# Patient Record
Sex: MALE | Race: WHITE | NOT HISPANIC OR LATINO | Employment: OTHER | URBAN - METROPOLITAN AREA
[De-identification: names, ages, dates, MRNs, and addresses within clinical notes are randomized per-mention and may not be internally consistent; named-entity substitution may affect disease eponyms.]

---

## 2020-04-22 ENCOUNTER — APPOINTMENT (OUTPATIENT)
Dept: GENERAL RADIOLOGY | Facility: CLINIC | Age: 60
End: 2020-04-22
Attending: NURSE PRACTITIONER
Payer: COMMERCIAL

## 2020-04-22 ENCOUNTER — HOSPITAL ENCOUNTER (INPATIENT)
Facility: CLINIC | Age: 60
LOS: 2 days | Discharge: HOME | End: 2020-04-24
Attending: NURSE PRACTITIONER | Admitting: INTERNAL MEDICINE
Payer: COMMERCIAL

## 2020-04-22 ENCOUNTER — NURSE TRIAGE (OUTPATIENT)
Dept: INTERNAL MEDICINE | Facility: CLINIC | Age: 60
End: 2020-04-22

## 2020-04-22 DIAGNOSIS — R07.9 CHEST PAIN: ICD-10-CM

## 2020-04-22 DIAGNOSIS — I20.0 UNSTABLE ANGINA (H): Primary | ICD-10-CM

## 2020-04-22 PROBLEM — R13.14 PHARYNGOESOPHAGEAL DYSPHAGIA: Status: ACTIVE | Noted: 2020-04-22

## 2020-04-22 LAB
ALBUMIN SERPL-MCNC: 4.2 G/DL (ref 3.4–5)
ALP SERPL-CCNC: 48 U/L (ref 40–150)
ALT SERPL W P-5'-P-CCNC: 30 U/L (ref 0–70)
ANION GAP SERPL CALCULATED.3IONS-SCNC: 7 MMOL/L (ref 3–14)
AST SERPL W P-5'-P-CCNC: 19 U/L (ref 0–45)
BASOPHILS # BLD AUTO: 0 10E9/L (ref 0–0.2)
BASOPHILS NFR BLD AUTO: 0.3 %
BILIRUB SERPL-MCNC: 0.8 MG/DL (ref 0.2–1.3)
BUN SERPL-MCNC: 13 MG/DL (ref 7–30)
CALCIUM SERPL-MCNC: 9.4 MG/DL (ref 8.5–10.1)
CHLORIDE SERPL-SCNC: 107 MMOL/L (ref 94–109)
CHOLEST SERPL-MCNC: 231 MG/DL
CO2 SERPL-SCNC: 23 MMOL/L (ref 20–32)
CREAT SERPL-MCNC: 0.69 MG/DL (ref 0.66–1.25)
D DIMER PPP FEU-MCNC: 0.5 UG/ML FEU (ref 0–0.5)
DIFFERENTIAL METHOD BLD: NORMAL
EOSINOPHIL # BLD AUTO: 0 10E9/L (ref 0–0.7)
EOSINOPHIL NFR BLD AUTO: 0.6 %
ERYTHROCYTE [DISTWIDTH] IN BLOOD BY AUTOMATED COUNT: 12.6 % (ref 10–15)
ERYTHROCYTE [DISTWIDTH] IN BLOOD BY AUTOMATED COUNT: 12.7 % (ref 10–15)
GFR SERPL CREATININE-BSD FRML MDRD: >90 ML/MIN/{1.73_M2}
GLUCOSE SERPL-MCNC: 97 MG/DL (ref 70–99)
HBA1C MFR BLD: 5 % (ref 0–5.6)
HCT VFR BLD AUTO: 44.1 % (ref 40–53)
HCT VFR BLD AUTO: 44.4 % (ref 40–53)
HDLC SERPL-MCNC: 36 MG/DL
HGB BLD-MCNC: 15.8 G/DL (ref 13.3–17.7)
HGB BLD-MCNC: 15.9 G/DL (ref 13.3–17.7)
IMM GRANULOCYTES # BLD: 0 10E9/L (ref 0–0.4)
IMM GRANULOCYTES NFR BLD: 0.2 %
INTERPRETATION ECG - MUSE: NORMAL
LDLC SERPL CALC-MCNC: 158 MG/DL
LIPASE SERPL-CCNC: 77 U/L (ref 73–393)
LYMPHOCYTES # BLD AUTO: 1.3 10E9/L (ref 0.8–5.3)
LYMPHOCYTES NFR BLD AUTO: 20.4 %
MCH RBC QN AUTO: 31.5 PG (ref 26.5–33)
MCH RBC QN AUTO: 31.6 PG (ref 26.5–33)
MCHC RBC AUTO-ENTMCNC: 35.6 G/DL (ref 31.5–36.5)
MCHC RBC AUTO-ENTMCNC: 36.1 G/DL (ref 31.5–36.5)
MCV RBC AUTO: 88 FL (ref 78–100)
MCV RBC AUTO: 89 FL (ref 78–100)
MONOCYTES # BLD AUTO: 0.3 10E9/L (ref 0–1.3)
MONOCYTES NFR BLD AUTO: 5.3 %
NEUTROPHILS # BLD AUTO: 4.7 10E9/L (ref 1.6–8.3)
NEUTROPHILS NFR BLD AUTO: 73.2 %
NONHDLC SERPL-MCNC: 195 MG/DL
NRBC # BLD AUTO: 0 10*3/UL
NRBC BLD AUTO-RTO: 0 /100
PLATELET # BLD AUTO: 241 10E9/L (ref 150–450)
PLATELET # BLD AUTO: 245 10E9/L (ref 150–450)
POTASSIUM SERPL-SCNC: 3.8 MMOL/L (ref 3.4–5.3)
PROT SERPL-MCNC: 7.7 G/DL (ref 6.8–8.8)
RBC # BLD AUTO: 5.01 10E12/L (ref 4.4–5.9)
RBC # BLD AUTO: 5.03 10E12/L (ref 4.4–5.9)
SODIUM SERPL-SCNC: 137 MMOL/L (ref 133–144)
TRIGL SERPL-MCNC: 186 MG/DL
TROPONIN I SERPL-MCNC: <0.015 UG/L (ref 0–0.04)
TSH SERPL DL<=0.005 MIU/L-ACNC: 3.29 MU/L (ref 0.4–4)
WBC # BLD AUTO: 6.5 10E9/L (ref 4–11)
WBC # BLD AUTO: 8.1 10E9/L (ref 4–11)

## 2020-04-22 PROCEDURE — 99223 1ST HOSP IP/OBS HIGH 75: CPT | Mod: AI | Performed by: INTERNAL MEDICINE

## 2020-04-22 PROCEDURE — 85379 FIBRIN DEGRADATION QUANT: CPT | Performed by: NURSE PRACTITIONER

## 2020-04-22 PROCEDURE — 83690 ASSAY OF LIPASE: CPT | Performed by: NURSE PRACTITIONER

## 2020-04-22 PROCEDURE — 25000132 ZZH RX MED GY IP 250 OP 250 PS 637: Performed by: NURSE PRACTITIONER

## 2020-04-22 PROCEDURE — 84484 ASSAY OF TROPONIN QUANT: CPT | Performed by: INTERNAL MEDICINE

## 2020-04-22 PROCEDURE — 25000132 ZZH RX MED GY IP 250 OP 250 PS 637: Performed by: INTERNAL MEDICINE

## 2020-04-22 PROCEDURE — 99285 EMERGENCY DEPT VISIT HI MDM: CPT | Mod: 25

## 2020-04-22 PROCEDURE — 80061 LIPID PANEL: CPT | Performed by: NURSE PRACTITIONER

## 2020-04-22 PROCEDURE — 12000000 ZZH R&B MED SURG/OB

## 2020-04-22 PROCEDURE — 36415 COLL VENOUS BLD VENIPUNCTURE: CPT | Performed by: INTERNAL MEDICINE

## 2020-04-22 PROCEDURE — 85027 COMPLETE CBC AUTOMATED: CPT | Performed by: INTERNAL MEDICINE

## 2020-04-22 PROCEDURE — 71046 X-RAY EXAM CHEST 2 VIEWS: CPT

## 2020-04-22 PROCEDURE — 25000128 H RX IP 250 OP 636

## 2020-04-22 PROCEDURE — 80053 COMPREHEN METABOLIC PANEL: CPT | Performed by: NURSE PRACTITIONER

## 2020-04-22 PROCEDURE — 83036 HEMOGLOBIN GLYCOSYLATED A1C: CPT | Performed by: NURSE PRACTITIONER

## 2020-04-22 PROCEDURE — 80061 LIPID PANEL: CPT | Performed by: INTERNAL MEDICINE

## 2020-04-22 PROCEDURE — 93005 ELECTROCARDIOGRAM TRACING: CPT

## 2020-04-22 PROCEDURE — 84443 ASSAY THYROID STIM HORMONE: CPT | Performed by: INTERNAL MEDICINE

## 2020-04-22 PROCEDURE — 84484 ASSAY OF TROPONIN QUANT: CPT | Mod: 91 | Performed by: NURSE PRACTITIONER

## 2020-04-22 PROCEDURE — 85025 COMPLETE CBC W/AUTO DIFF WBC: CPT | Performed by: NURSE PRACTITIONER

## 2020-04-22 RX ORDER — PROCHLORPERAZINE 25 MG
25 SUPPOSITORY, RECTAL RECTAL EVERY 12 HOURS PRN
Status: DISCONTINUED | OUTPATIENT
Start: 2020-04-22 | End: 2020-04-24 | Stop reason: HOSPADM

## 2020-04-22 RX ORDER — SIMVASTATIN 20 MG
20 TABLET ORAL EVERY EVENING
Status: DISCONTINUED | OUTPATIENT
Start: 2020-04-22 | End: 2020-04-23

## 2020-04-22 RX ORDER — MORPHINE SULFATE 2 MG/ML
2-4 INJECTION, SOLUTION INTRAMUSCULAR; INTRAVENOUS
Status: DISCONTINUED | OUTPATIENT
Start: 2020-04-22 | End: 2020-04-24 | Stop reason: HOSPADM

## 2020-04-22 RX ORDER — SODIUM CHLORIDE 9 MG/ML
INJECTION, SOLUTION INTRAVENOUS CONTINUOUS
Status: DISCONTINUED | OUTPATIENT
Start: 2020-04-23 | End: 2020-04-23

## 2020-04-22 RX ORDER — NITROGLYCERIN 0.4 MG/1
0.4 TABLET SUBLINGUAL EVERY 5 MIN PRN
Status: DISCONTINUED | OUTPATIENT
Start: 2020-04-22 | End: 2020-04-23

## 2020-04-22 RX ORDER — POLYETHYLENE GLYCOL 3350 17 G/17G
17 POWDER, FOR SOLUTION ORAL DAILY PRN
Status: DISCONTINUED | OUTPATIENT
Start: 2020-04-22 | End: 2020-04-24 | Stop reason: HOSPADM

## 2020-04-22 RX ORDER — METOCLOPRAMIDE HYDROCHLORIDE 5 MG/ML
10 INJECTION INTRAMUSCULAR; INTRAVENOUS EVERY 6 HOURS PRN
Status: DISCONTINUED | OUTPATIENT
Start: 2020-04-22 | End: 2020-04-24 | Stop reason: HOSPADM

## 2020-04-22 RX ORDER — ONDANSETRON 4 MG/1
4 TABLET, ORALLY DISINTEGRATING ORAL EVERY 6 HOURS PRN
Status: DISCONTINUED | OUTPATIENT
Start: 2020-04-22 | End: 2020-04-24 | Stop reason: HOSPADM

## 2020-04-22 RX ORDER — NALOXONE HYDROCHLORIDE 0.4 MG/ML
.1-.4 INJECTION, SOLUTION INTRAMUSCULAR; INTRAVENOUS; SUBCUTANEOUS
Status: DISCONTINUED | OUTPATIENT
Start: 2020-04-22 | End: 2020-04-23

## 2020-04-22 RX ORDER — PROCHLORPERAZINE MALEATE 5 MG
10 TABLET ORAL EVERY 6 HOURS PRN
Status: DISCONTINUED | OUTPATIENT
Start: 2020-04-22 | End: 2020-04-24 | Stop reason: HOSPADM

## 2020-04-22 RX ORDER — PANTOPRAZOLE SODIUM 40 MG/1
40 TABLET, DELAYED RELEASE ORAL
Status: DISCONTINUED | OUTPATIENT
Start: 2020-04-23 | End: 2020-04-24 | Stop reason: HOSPADM

## 2020-04-22 RX ORDER — ONDANSETRON 2 MG/ML
4 INJECTION INTRAMUSCULAR; INTRAVENOUS EVERY 6 HOURS PRN
Status: DISCONTINUED | OUTPATIENT
Start: 2020-04-22 | End: 2020-04-24 | Stop reason: HOSPADM

## 2020-04-22 RX ORDER — ALUMINA, MAGNESIA, AND SIMETHICONE 2400; 2400; 240 MG/30ML; MG/30ML; MG/30ML
30 SUSPENSION ORAL EVERY 4 HOURS PRN
Status: DISCONTINUED | OUTPATIENT
Start: 2020-04-22 | End: 2020-04-24 | Stop reason: HOSPADM

## 2020-04-22 RX ORDER — SODIUM CHLORIDE 9 MG/ML
INJECTION, SOLUTION INTRAVENOUS CONTINUOUS
Status: DISCONTINUED | OUTPATIENT
Start: 2020-04-22 | End: 2020-04-22

## 2020-04-22 RX ORDER — LABETALOL HYDROCHLORIDE 5 MG/ML
10 INJECTION, SOLUTION INTRAVENOUS
Status: DISCONTINUED | OUTPATIENT
Start: 2020-04-22 | End: 2020-04-24 | Stop reason: HOSPADM

## 2020-04-22 RX ORDER — ASPIRIN 81 MG/1
324 TABLET, CHEWABLE ORAL ONCE
Status: COMPLETED | OUTPATIENT
Start: 2020-04-22 | End: 2020-04-22

## 2020-04-22 RX ORDER — ACETAMINOPHEN 650 MG/1
650 SUPPOSITORY RECTAL EVERY 4 HOURS PRN
Status: DISCONTINUED | OUTPATIENT
Start: 2020-04-22 | End: 2020-04-24 | Stop reason: HOSPADM

## 2020-04-22 RX ORDER — HEPARIN SODIUM 10000 [USP'U]/100ML
1100 INJECTION, SOLUTION INTRAVENOUS CONTINUOUS
Status: DISCONTINUED | OUTPATIENT
Start: 2020-04-22 | End: 2020-04-23

## 2020-04-22 RX ORDER — ACETAMINOPHEN 325 MG/1
650 TABLET ORAL EVERY 4 HOURS PRN
Status: DISCONTINUED | OUTPATIENT
Start: 2020-04-22 | End: 2020-04-24 | Stop reason: HOSPADM

## 2020-04-22 RX ORDER — LIDOCAINE 40 MG/G
CREAM TOPICAL
Status: DISCONTINUED | OUTPATIENT
Start: 2020-04-22 | End: 2020-04-24 | Stop reason: HOSPADM

## 2020-04-22 RX ORDER — METOCLOPRAMIDE 5 MG/1
10 TABLET ORAL EVERY 6 HOURS PRN
Status: DISCONTINUED | OUTPATIENT
Start: 2020-04-22 | End: 2020-04-24 | Stop reason: HOSPADM

## 2020-04-22 RX ORDER — MULTIPLE VITAMINS W/ MINERALS TAB 9MG-400MCG
1 TAB ORAL DAILY
COMMUNITY
End: 2020-05-08

## 2020-04-22 RX ORDER — ASPIRIN 325 MG
325 TABLET ORAL DAILY
Status: DISCONTINUED | OUTPATIENT
Start: 2020-04-23 | End: 2020-04-23

## 2020-04-22 RX ORDER — METOPROLOL TARTRATE 25 MG/1
25 TABLET, FILM COATED ORAL 2 TIMES DAILY
Status: DISCONTINUED | OUTPATIENT
Start: 2020-04-22 | End: 2020-04-22

## 2020-04-22 RX ADMIN — SIMVASTATIN 20 MG: 20 TABLET, FILM COATED ORAL at 20:20

## 2020-04-22 RX ADMIN — ASPIRIN 81 MG 324 MG: 81 TABLET ORAL at 12:34

## 2020-04-22 RX ADMIN — HEPARIN SODIUM 1100 UNITS/HR: 10000 INJECTION, SOLUTION INTRAVENOUS at 18:33

## 2020-04-22 RX ADMIN — HEPARIN SODIUM 1100 UNITS/HR: 10000 INJECTION, SOLUTION INTRAVENOUS at 18:36

## 2020-04-22 ASSESSMENT — ACTIVITIES OF DAILY LIVING (ADL)
COGNITION: 0 - NO COGNITION ISSUES REPORTED
RETIRED_EATING: 0-->INDEPENDENT
ADLS_ACUITY_SCORE: 10
BATHING: 0-->INDEPENDENT
RETIRED_COMMUNICATION: 0-->UNDERSTANDS/COMMUNICATES WITHOUT DIFFICULTY
FALL_HISTORY_WITHIN_LAST_SIX_MONTHS: NO
DRESS: 0-->INDEPENDENT
TOILETING: 0-->INDEPENDENT
TRANSFERRING: 0-->INDEPENDENT
SWALLOWING: 0-->SWALLOWS FOODS/LIQUIDS WITHOUT DIFFICULTY
AMBULATION: 0-->INDEPENDENT

## 2020-04-22 ASSESSMENT — MIFFLIN-ST. JEOR: SCORE: 1968.38

## 2020-04-22 ASSESSMENT — ENCOUNTER SYMPTOMS
SHORTNESS OF BREATH: 0
VOMITING: 0
NAUSEA: 0
CHEST TIGHTNESS: 1
COUGH: 0
DIAPHORESIS: 0
FEVER: 0

## 2020-04-22 NOTE — ED NOTES
"[employer] [employer]  ED Nurse Handoff Report    ED Chief complaint: Chest Pain      ED Diagnosis:   Final diagnoses:   Chest pain       Code Status: Full Code    Allergies:   Allergies   Allergen Reactions     Niacin      flushing       Patient Story: Over last month, patient has been having intermittent episodes of chest pain with exertion.  Recently, he has developed chest pain at rest and with exertion. He walks 2 miles almost every day for the past month. No history of cardiac.   Focused Assessment:  BP has been elevated upon arrival and throughout ED stay.  Patient states he usually has normal blood pressure.     Treatments and/or interventions provided:  mg. Cardiac monitor shows NSR.    Patient's response to treatments and/or interventions: NA    To be done/followed up on inpatient unit:  Patient needs a PCP.    Does this patient have any cognitive concerns?: None    Activity level - Baseline/Home:  Independent  Activity Level - Current:   Independent    Patient's Preferred language: English   Needed?: No    Isolation: None  Infection: Not Applicable  Bariatric?: No    Vital Signs:   Vitals:    04/22/20 1141   BP: (!) 172/87   Pulse: 75   Resp: 14   Temp: 97.7  F (36.5  C)   SpO2: 97%   Weight: 108.9 kg (240 lb)   Height: 1.88 m (6' 2\")       Cardiac Rhythm: NSR    Was the PSS-3 completed:   Yes  What interventions are required if any?  NA             Family Comments: NA  OBS brochure/video discussed/provided to patient/family: Yes              Name of person given brochure if not patient: NA              Relationship to patient: NA    For the majority of the shift this patient's behavior was Green.   Behavioral interventions performed were NA.    ED NURSE PHONE NUMBER: [phone]    Danielle Montemayor RN, BSN  Emergency Department  [facility]         "

## 2020-04-22 NOTE — H&P
[facility]    History and Physical  Hospitalist       Date of Admission:  4/22/2020    Assessment & Plan     This is a 60-year-old male with history of hyperlipidemia, not on any medication, comes to the ER with complaint of worsening chest pain for the last 1 month.      ASSESSMENT AND PLAN:   1.  Unstable angina.  This is a 60-year-old male with history of hyperlipidemia, mild obesity, comes in with worsening chest pain which is retrosternal and initially with exertion and now daily and intermittent, lasting for 5-45 minutes and resolved.  At this time, given his family history, his personal history, we need to rule out coronary artery disease.  We will treat him as unstable angina.  He already got aspirin in the ER.  EKG at rest at this time is negative and does not show any acute ischemic changes.  Chest x-ray revealed no acute infiltrate, congestive changes, pneumothorax or enlarged heart.  D-dimer negative, we will admit him, keep him on aspirin.  I will avoid beta blocker at this time, as he was going to have the stress test in the morning, keep him on IV heparin, low dose for cardiac reasons.  For unstable angina, simvastatin 20 mg daily.  Echocardiogram and stress echo in the morning.  Consult Cardiology to evaluate the patient.  Serial troponins at this time.     2.  Dysphagia:  The patient does have history of dysphagia but does not have any other warning signs, weight is stable, actually he has gained some weight.  No melena, hematochezia or altered bowel movement.  No history of malnourishment. Dysphagia is with solids more than liquids.  This can be secondary to reflux.  The chest pain can be secondary to this dysphagia.  At this time want to rule out unstable angina.  We will have GI evaluate the patient.  The patient maybe in the future or as an outpatient will need EGD.   3.  History of hyperlipidemia, not on any medication.  We will go ahead and screen him for hyperlipidemia.   Check his lipid panel in the morning.   4.  Gastrointestinal prophylaxis with Protonix.   5.  Deep venous thrombosis prophylaxis with IV heparin.      CODE STATUS:  Full code.      At this time, I will admit him to the cardiac floor, start him on IV heparin, aspirin, simvastatin.  Hold beta blocker until he has the stress test.  If positive, we will restart the beta blockers.  Consult Cardiology.  Consult GI to evaluate the patient.  We will screen him for diabetes, check his A1c and lipid panel at this time.      The case discussed with ER physician and the nursing staff taking care of the patient.         EDU RAMÍREZ MD         DVT Prophylaxis: Heparin IV  Code Status: Full Code    Disposition: Expected discharge in 1-2 days once stable.    Edu Ramírez MD    Primary Care Physician   Physician No Ref-Primary    Chief Complaint   Chest pain    History is obtained from the patient    History of Present Illness   Admitted:     04/22/2020      HISTORY OF PRESENT ILLNESS:  This is a 60-year-old male with history of hyperlipidemia, not on any medication, comes to the ER with complaint of worsening chest pain for the last 1 month.      According to the patient, he started walking back again after the winter and was walking up the hill a month ago when he noticed that he started having retrosternal chest pain to the left, felt like pressure of 5-6/10 on pain scale as long as he has been climbing the hill.  Once he is on top of that, his chest pain went away.  It is happening daily whenever he tried to walk.  So he stopped going up the hill and was walking and 1 week later, he started having pain while at rest as well.      The patient almost getting pain every day for the last 1 week or so and lasted anywhere from 5 minutes to 45 minutes, sometimes associated with exertion and sometimes even at rest.  There are no associated symptoms of shortness of breath, radiation, headache, dizziness, lightheadedness or  palpitation.  This morning when he woke up, he was doing fine.  Then after his video conference today he started having chest pain again which lasted for half an hour and resolved by itself.  This evening he started having pain again, so he comes to the ER.  While he was in the ER, he was chest pain-free.  Then he started walking in the room and got the pain again that lasted for 5 minutes.  He never told anyone at that time.      The patient is an ex-smoker, quit smoking in 1993, about a 13 pack year history.  Family history is positive for thromboembolism and atrial fibrillation.  Grandfather from paternal side has a history of coronary artery disease.  The patient does not drink alcohol.  Does not use any recreational drugs either.  Because of this worsening symptoms, he comes to the ER.      The patient also told me that for some time, has been going on, that he has been having some dysphagia symptoms.  Whenever he eats solid food he has to take a glass of water to keep it down.  He felt like it is stuck in his neck.  For some time and has been looking to see some gastroenterologist, but because of the COVID-19 situation he was unable to see his doctor or the gastroenterologist at that time.  He denies any symptoms of heartburn.  He denies any hematemesis, melena, hematochezia.  No altered bowel movements.  He had a colonoscopy when he was 50 and is due for one now,  but according to him, it was absolutely normal.  He told me that he has a history of a stress test in 2002 which was negative as well.  He does have a history of hyperlipidemia.  He was prescribed niacin at that time, but because of the side effect he stopped using that medication.     Past Medical History    I have reviewed this patient's medical history and updated it with pertinent information if needed.   Past Medical History:   Diagnosis Date     Intestinal infection due to Clostridium difficile      Mixed hyperlipidemia        Past Surgical  History   I have reviewed this patient's surgical history and updated it with pertinent information if needed.  Past Surgical History:   Procedure Laterality Date     BACK SURGERY      L5-S1 disk herniation      NO HISTORY OF SURGERY         Prior to Admission Medications   Prior to Admission Medications   Prescriptions Last Dose Informant Patient Reported? Taking?   multivitamin w/minerals (THERA-VIT-M) tablet 4/22/2020 at am  Yes Yes   Sig: Take 1 tablet by mouth daily      Facility-Administered Medications: None     Allergies   Allergies   Allergen Reactions     Niacin      flushing       Social History   I have reviewed this patient's social history and updated it with pertinent information if needed. Amado Veliz  reports that he quit smoking about 28 years ago. He quit after 13.00 years of use. He has never used smokeless tobacco. He reports that he does not drink alcohol or use drugs.    Family History   I have reviewed this patient's family history and updated it with pertinent information if needed.   Family History   Problem Relation Age of Onset     Family History Negative Father      Deep Vein Thrombosis Father      Atrial fibrillation Father      Family History Negative Mother      Family History Negative Brother      Deep Vein Thrombosis Sister      Ovarian Cancer Maternal Grandmother      Coronary Artery Disease Maternal Grandfather        Review of Systems   CONSTITUTIONAL:  negative  EYES:  negative  HEENT:  negative  RESPIRATORY:  negative  CARDIOVASCULAR:  positive for  chest pain  GASTROINTESTINAL:  positive for dysphagia  GENITOURINARY:  negative  INTEGUMENT/BREAST:  negative  HEMATOLOGIC/LYMPHATIC:  negative  ALLERGIC/IMMUNOLOGIC:  negative  ENDOCRINE:  negative  MUSCULOSKELETAL:  negative  NEUROLOGICAL:  negative  BEHAVIOR/PSYCH:  negative    Physical Exam   Temp: 97.7  F (36.5  C)   BP: (!) 172/87 Pulse: 75   Resp: 14 SpO2: 97 % O2 Device: None (Room air)    Vital Signs with  Ranges  Temp:  [97.7  F (36.5  C)] 97.7  F (36.5  C)  Pulse:  [75] 75  Resp:  [14] 14  BP: (172)/(87) 172/87  SpO2:  [97 %] 97 %  240 lbs 0 oz    Constitutional: Awake, alert, cooperative, no apparent distress.  Eyes: Conjunctiva and pupils examined and normal.  HEENT: Moist mucous membranes, normal dentition.  Respiratory: Clear to auscultation bilaterally, no crackles or wheezing.  Cardiovascular: Regular rate and rhythm, normal S1 and S2, and no murmur noted.  GI: Soft, non-distended, non-tender, normal bowel sounds.  Lymph/Hematologic: No anterior cervical or supraclavicular adenopathy.  Skin: No rashes, no cyanosis, no edema.  Musculoskeletal: No joint swelling, erythema or tenderness.  Neurologic: Cranial nerves 2-12 intact, normal strength and sensation.  Psychiatric: Alert, oriented to person, place and time, no obvious anxiety or depression.    Data   Data reviewed today:  I personally reviewed the EKG tracing showing NSR, no acute ischemic changes and the chest x-ray image(s) showing normal size heart, no acute infiltrate or congestive changes.  Recent Labs   Lab 04/22/20  1420 04/22/20  1222   WBC  --  6.5   HGB  --  15.9   MCV  --  88   PLT  --  241   NA  --  137   POTASSIUM  --  3.8   CHLORIDE  --  107   CO2  --  23   BUN  --  13   CR  --  0.69   ANIONGAP  --  7   DANIELITO  --  9.4   GLC  --  97   ALBUMIN  --  4.2   PROTTOTAL  --  7.7   BILITOTAL  --  0.8   ALKPHOS  --  48   ALT  --  30   AST  --  19   LIPASE  --  77   TROPI <0.015 <0.015       Recent Results (from the past 24 hour(s))   Chest XR,  PA & LAT    Narrative    CHEST TWO VIEWS April 22, 2020 1:41 PM     HISTORY: Chest pain.    COMPARISON: None.       Impression    IMPRESSION: There are no acute infiltrates. The cardiac silhouette is  not enlarged. Pulmonary vasculature is unremarkable.    DAVIN SALCEDO MD

## 2020-04-22 NOTE — TELEPHONE ENCOUNTER
Pt states his middle left chest pain started about a month ago. He was walking up a hill and started. Once he rested, the pain went away. He walks 2 miles daily. He stopped walking up the hill and walks flat surface now.   The chest pain for the past week, is now starting without any activity. Can be sitting watching TV and feel the pain.   This can last 45 minutes to an hour. Does not radiate. No sweating. It is mild.     He had a trip to [place] on 3/12-3/17. Flight down, drove home.     Advised [facility] ED if pain returns today, and to take a full aspirin. He has them at home. He agrees with this.         Additional Information    Negative: Severe difficulty breathing (e.g., struggling for each breath, speaks in single words)    Negative: Passed out (i.e., fainted, collapsed and was not responding)    Negative: Chest pain lasting longer than 5 minutes and ANY of the following:* Over 50 years old* Over 30 years old and at least one cardiac risk factor (i.e., high blood pressure, diabetes, high cholesterol, obesity, smoker or strong family history of heart disease)* Pain is crushing, pressure-like, or heavy * Took nitroglycerin and chest pain was not relieved* History of heart disease (i.e., angina, heart attack, bypass surgery, angioplasty, CHF)    Negative: Visible sweat on face or sweat dripping down face    Negative: Sounds like a life-threatening emergency to the triager    Negative: Followed an injury to chest    Negative: SEVERE chest pain    Negative: Pain also present in shoulder(s) or arm(s) or jaw    Negative: Difficulty breathing    Negative: Cocaine use within last 3 days    Negative: History of prior 'blood clot' in leg or lungs (i.e., deep vein thrombosis, pulmonary embolism)    Negative: Recent illness requiring prolonged bed rest (i.e., immobilization)    Negative: Hip or leg fracture in past 2 months (e.g, or had cast on leg or ankle)    Negative: Major surgery in the past month    Negative:  "Recent long-distance travel with prolonged time in car, bus, plane, or train (i.e., within past 2 weeks; 6 or more hours duration)    Negative: Heart beating irregularly or very rapidly    Chest pain lasting longer than 5 minutes    Answer Assessment - Initial Assessment Questions  1. LOCATION: \"Where does it hurt?\"        Middle, left chest   2. RADIATION: \"Does the pain go anywhere else?\" (e.g., into neck, jaw, arms, back)      none  3. ONSET: \"When did the chest pain begin?\" (Minutes, hours or days)       When walking up a hill. A few weeks ago. Now with sitting. 4 weeks total.   4. PATTERN \"Does the pain come and go, or has it been constant since it started?\"  \"Does it get worse with exertion?\"       Comes and goes. Not really. Does a 2 mile walk daily.   5. DURATION: \"How long does it last\" (e.g., seconds, minutes, hours)      At first, within minutes. Now it can last 45 minutes to an hour.   6. SEVERITY: \"How bad is the pain?\"  (e.g., Scale 1-10; mild, moderate, or severe)     - MILD (1-3): doesn't interfere with normal activities      - MODERATE (4-7): interferes with normal activities or awakens from sleep     - SEVERE (8-10): excruciating pain, unable to do any normal activities        Mild, but worrisome.    7. CARDIAC RISK FACTORS: \"Do you have any history of heart problems or risk factors for heart disease?\" (e.g., prior heart attack, angina; high blood pressure, diabetes, being overweight, high cholesterol, smoking, or strong family history of heart disease)      Dad had Afib, on Blood thinner, sister on blood thinner, for DVTs.   8. PULMONARY RISK FACTORS: \"Do you have any history of lung disease?\"  (e.g., blood clots in lung, asthma, emphysema, birth control pills)      None.   9. CAUSE: \"What do you think is causing the chest pain?\"      Not sure/  10. OTHER SYMPTOMS: \"Do you have any other symptoms?\" (e.g., dizziness, nausea, vomiting, sweating, fever, difficulty breathing, cough)        none  11. " "PREGNANCY: \"Is there any chance you are pregnant?\" \"When was your last menstrual period?\"        na    Protocols used: CHEST PAIN-A-OH      "

## 2020-04-22 NOTE — PLAN OF CARE
Patient transferred from ED @ 1645. A&O x4. Up independent. Denies chest pain or SOB. On tele with SR. Heparin infusing. Patient not being comfortable in his assigned room facing droplet isolation room. The writer offered if patient will feel comfortable communicating with a supervisor. Pt appreciated, notified ANS.

## 2020-04-22 NOTE — ED PROVIDER NOTES
Emergency Department Attending Supervision Note  4/22/2020  3:42 PM      I evaluated this patient in conjunction with Ailyn Blackwell DNP and agree with his evaluation, exam, diagnosis, and disposition.      Briefly, the patient presented with a history of some tightness in his chest this spring when he is been walking up and down the hill.  It is happened in the past in the spring when he starts walking again but this time it concerned him because for the last few days, he has been getting the tightness in his chest when he has been at rest not doing anything at home.  No real shortness of breath, no fevers or chills, no cough.      On my exam, patient has an EKG with nonspecific changes, 2 troponins are negative, chest x-ray is unremarkable.  I think his history is concerning enough that he should come in overnight for further work-up and if negative, stress test tomorrow.  This could be unstable angina.  He is pain-free at this time but did get a brief episode of tightness in the emergency room.  He did not tell the nurse about it.  He was given aspirin here will hold on heparin at this time.  If he redevelops chest pain, would try nitro.      Diagnosis    ICD-10-CM    1. Chest pain  R07.9          Zuleyka Sutherland MD , Zuleyka Garcia MD  04/22/20 1544

## 2020-04-22 NOTE — PROGRESS NOTES
RECEIVING UNIT ED HANDOFF REVIEW    ED Nurse Handoff Report was reviewed by: Christi Palacios RN on April 22, 2020 at 4:10 PM

## 2020-04-22 NOTE — PHARMACY-ADMISSION MEDICATION HISTORY
Pharmacy Medication History  Admission medication history interview status for the 4/22/2020  admission is complete. See EPIC admission navigator for prior to admission medications     Medication history sources: Patient  Medication history source reliability: Good  Adherence assessment: Good    Significant changes made to the medication list:  -Removed outdated medications and supplements    Additional medication history information:   -He reports currently only taking a multivitamin. He does not usually take aspirin but was given some today.    Medication reconciliation completed by provider prior to medication history? No    Time spent in this activity: 5 min      Prior to Admission medications    Medication Sig Last Dose Taking? Auth Provider   multivitamin w/minerals (THERA-VIT-M) tablet Take 1 tablet by mouth daily 4/22/2020 at am Yes Unknown, Entered By History

## 2020-04-22 NOTE — ED TRIAGE NOTES
Pt presents with chest pain that has been intermittent for the past month. Pt states one month ago he noticed while exerting self or walking up hills he would developed chest pain and would resolved when the exertion stopped. Now, pt states pain returns intermittently when at rest. Denies cardiac hx but has family hx. Pt is pain free at this time

## 2020-04-22 NOTE — Clinical Note
The first balloon was inserted into the right coronary artery and proximal right coronary artery.Max pressure = 12 jhoan. Total duration = 12 seconds.     Max pressure = 8 jhoan. Total duration = 11 seconds.    Balloon reinflated a second time: Max pressure = 8 jhoan. Total duration = 11 seconds.

## 2020-04-22 NOTE — H&P
Admitted:     04/22/2020      HISTORY OF PRESENT ILLNESS:  This is a 60-year-old male with history of hyperlipidemia, not on any medication, comes to the ER with complaint of worsening chest pain for the last 1 month.      According to the patient, he started walking back again after the winter and was walking up the hill a month ago when he noticed that he started having retrosternal chest pain to the left, felt like pressure of 5-6/10 on pain scale as long as he has been climbing the hill.  Once he is on top of that, his chest pain went away.  It is happening daily whenever he tried to walk.  So he stopped going up the hill and was walking and 1 week later, he started having pain while at rest as well.      The patient almost getting pain every day for the last 1 week or so and lasted anywhere from 5 minutes to 45 minutes, sometimes associated with exertion and sometimes even at rest.  There are no associated symptoms of shortness of breath, radiation, headache, dizziness, lightheadedness or palpitation.  This morning when he woke up, he was doing fine.  Then after his video conference today he started having chest pain again which lasted for half an hour and resolved by itself.  This evening he started having pain again, so he comes to the ER.  While he was in the ER, he was chest pain-free.  Then he started walking in the room and got the pain again that lasted for 5 minutes.  He never told anyone at that time.      The patient is an ex-smoker, quit smoking in 1993, about a 13 pack year history.  Family history is positive for thromboembolism and atrial fibrillation.  Grandfather from paternal side has a history of coronary artery disease.  The patient does not drink alcohol.  Does not use any recreational drugs either.  Because of this worsening symptoms, he comes to the ER.      The patient also told me that for some time, has been going on, that he has been having some dysphagia symptoms.  Whenever he eats  solid food he has to take a glass of water to keep it down.  He felt like it is stuck in his neck.  For some time and has been looking to see some gastroenterologist, but because of the COVID-19 situation he was unable to see his doctor or the gastroenterologist at that time.  He denies any symptoms of heartburn.  He denies any hematemesis, melena, hematochezia.  No altered bowel movements.  He had a colonoscopy when he was 50 and is due for one now,  but according to him, it was absolutely normal.  He told me that he has a history of a stress test in 2002 which was negative as well.  He does have a history of hyperlipidemia.  He was prescribed niacin at that time, but because of the side effect he stopped using that medication.      ASSESSMENT AND PLAN:   1.  Unstable angina.  This is a 60-year-old male with history of hyperlipidemia, mild obesity, comes in with worsening chest pain which is retrosternal and initially with exertion and now daily and intermittent, lasting for 5-45 minutes and resolved.  At this time, given his family history, his personal history, we need to rule out coronary artery disease.  We will treat him as unstable angina.  He already got aspirin in the ER.  EKG at rest at this time is negative and does not show any acute ischemic changes.  Chest x-ray revealed no acute infiltrate, congestive changes, pneumothorax or enlarged heart.  D-dimer negative, we will admit him, keep him on aspirin.  I will avoid beta blocker at this time, as he was going to have the stress test in the morning, keep him on IV heparin, low dose for cardiac reasons.  For unstable angina, simvastatin 20 mg daily.  Echocardiogram and stress echo in the morning.  Consult Cardiology to evaluate the patient.  Serial troponins at this time.     2.  Dysphagia:  The patient does have history of dysphagia but does not have any other warning signs, weight is stable, actually he has gained some weight.  No melena, hematochezia or  altered bowel movement.  No history of malnourishment.  With solids more than liquids.  This can be secondary to reflux.  The chest pain can be secondary to this dysphagia.  At this time want to rule out unstable angina.  We will have GI evaluate the patient.  The patient maybe in the future or as an outpatient will need EGD.   3.  History of hyperlipidemia, not on any medication.  We will go ahead and screen him for hyperlipidemia.  Check his lipid panel in the morning.   4.  Gastrointestinal prophylaxis with Protonix.   5.  Deep venous thrombosis prophylaxis with IV heparin.      CODE STATUS:  Full code.      At this time, I will admit him to the cardiac floor, start him on IV heparin, aspirin, simvastatin.  Hold beta blocker until he has the stress test.  If positive, we will restart the beta blockers.  Consult Cardiology.  Consult GI to evaluate the patient.  We will screen him for diabetes, check his A1c and lipid panel at this time.      The case discussed with ER physician and the nursing staff taking care of the patient.         SACHIN GREWAL MD             D: 2020   T: 2020   MT: EMILIA      Name:     NATE OLEA   MRN:      [id number]        Account:      [id number]   :      1960        Admitted:     2020                   Document: [id number]

## 2020-04-23 ENCOUNTER — APPOINTMENT (OUTPATIENT)
Dept: CARDIOLOGY | Facility: CLINIC | Age: 60
End: 2020-04-23
Attending: INTERNAL MEDICINE
Payer: COMMERCIAL

## 2020-04-23 ENCOUNTER — SURGERY (OUTPATIENT)
Age: 60
End: 2020-04-23
Payer: COMMERCIAL

## 2020-04-23 LAB
CHOLEST SERPL-MCNC: 226 MG/DL
HDLC SERPL-MCNC: 35 MG/DL
KCT BLD-ACNC: 251 SEC (ref 75–150)
LDLC SERPL CALC-MCNC: 147 MG/DL
LMWH PPP CHRO-ACNC: 0.24 IU/ML
LMWH PPP CHRO-ACNC: 0.26 IU/ML
NONHDLC SERPL-MCNC: 191 MG/DL
TRIGL SERPL-MCNC: 218 MG/DL
TROPONIN I SERPL-MCNC: <0.015 UG/L (ref 0–0.04)

## 2020-04-23 PROCEDURE — 93306 TTE W/DOPPLER COMPLETE: CPT | Mod: 26 | Performed by: INTERNAL MEDICINE

## 2020-04-23 PROCEDURE — 85520 HEPARIN ASSAY: CPT | Performed by: INTERNAL MEDICINE

## 2020-04-23 PROCEDURE — 93016 CV STRESS TEST SUPVJ ONLY: CPT | Performed by: INTERNAL MEDICINE

## 2020-04-23 PROCEDURE — 99232 SBSQ HOSP IP/OBS MODERATE 35: CPT | Performed by: HOSPITALIST

## 2020-04-23 PROCEDURE — 99152 MOD SED SAME PHYS/QHP 5/>YRS: CPT | Mod: 59 | Performed by: INTERNAL MEDICINE

## 2020-04-23 PROCEDURE — 25000128 H RX IP 250 OP 636: Performed by: INTERNAL MEDICINE

## 2020-04-23 PROCEDURE — C1894 INTRO/SHEATH, NON-LASER: HCPCS | Performed by: INTERNAL MEDICINE

## 2020-04-23 PROCEDURE — 40000264 ECHOCARDIOGRAM COMPLETE

## 2020-04-23 PROCEDURE — C1874 STENT, COATED/COV W/DEL SYS: HCPCS | Performed by: INTERNAL MEDICINE

## 2020-04-23 PROCEDURE — 92928 PRQ TCAT PLMT NTRAC ST 1 LES: CPT | Mod: RC | Performed by: INTERNAL MEDICINE

## 2020-04-23 PROCEDURE — 27210794 ZZH OR GENERAL SUPPLY STERILE: Performed by: INTERNAL MEDICINE

## 2020-04-23 PROCEDURE — 25000132 ZZH RX MED GY IP 250 OP 250 PS 637: Performed by: HOSPITALIST

## 2020-04-23 PROCEDURE — 92978 ENDOLUMINL IVUS OCT C 1ST: CPT | Mod: 26 | Performed by: INTERNAL MEDICINE

## 2020-04-23 PROCEDURE — 36415 COLL VENOUS BLD VENIPUNCTURE: CPT | Performed by: INTERNAL MEDICINE

## 2020-04-23 PROCEDURE — B2111ZZ FLUOROSCOPY OF MULTIPLE CORONARY ARTERIES USING LOW OSMOLAR CONTRAST: ICD-10-PCS | Performed by: INTERNAL MEDICINE

## 2020-04-23 PROCEDURE — 85347 COAGULATION TIME ACTIVATED: CPT

## 2020-04-23 PROCEDURE — 93454 CORONARY ARTERY ANGIO S&I: CPT | Performed by: INTERNAL MEDICINE

## 2020-04-23 PROCEDURE — 93005 ELECTROCARDIOGRAM TRACING: CPT

## 2020-04-23 PROCEDURE — 99153 MOD SED SAME PHYS/QHP EA: CPT | Performed by: INTERNAL MEDICINE

## 2020-04-23 PROCEDURE — 93350 STRESS TTE ONLY: CPT | Mod: 26 | Performed by: INTERNAL MEDICINE

## 2020-04-23 PROCEDURE — 84484 ASSAY OF TROPONIN QUANT: CPT | Performed by: INTERNAL MEDICINE

## 2020-04-23 PROCEDURE — C1887 CATHETER, GUIDING: HCPCS | Performed by: INTERNAL MEDICINE

## 2020-04-23 PROCEDURE — 25000132 ZZH RX MED GY IP 250 OP 250 PS 637: Performed by: INTERNAL MEDICINE

## 2020-04-23 PROCEDURE — 027034Z DILATION OF CORONARY ARTERY, ONE ARTERY WITH DRUG-ELUTING INTRALUMINAL DEVICE, PERCUTANEOUS APPROACH: ICD-10-PCS | Performed by: INTERNAL MEDICINE

## 2020-04-23 PROCEDURE — 93321 DOPPLER ECHO F-UP/LMTD STD: CPT | Mod: 26 | Performed by: INTERNAL MEDICINE

## 2020-04-23 PROCEDURE — C1753 CATH, INTRAVAS ULTRASOUND: HCPCS | Performed by: INTERNAL MEDICINE

## 2020-04-23 PROCEDURE — 25500064 ZZH RX 255 OP 636: Performed by: INTERNAL MEDICINE

## 2020-04-23 PROCEDURE — 93454 CORONARY ARTERY ANGIO S&I: CPT | Mod: 26 | Performed by: INTERNAL MEDICINE

## 2020-04-23 PROCEDURE — C1725 CATH, TRANSLUMIN NON-LASER: HCPCS | Performed by: INTERNAL MEDICINE

## 2020-04-23 PROCEDURE — 92978 ENDOLUMINL IVUS OCT C 1ST: CPT | Performed by: INTERNAL MEDICINE

## 2020-04-23 PROCEDURE — 99152 MOD SED SAME PHYS/QHP 5/>YRS: CPT | Performed by: INTERNAL MEDICINE

## 2020-04-23 PROCEDURE — 93010 ELECTROCARDIOGRAM REPORT: CPT | Performed by: INTERNAL MEDICINE

## 2020-04-23 PROCEDURE — 93325 DOPPLER ECHO COLOR FLOW MAPG: CPT | Mod: 26 | Performed by: INTERNAL MEDICINE

## 2020-04-23 PROCEDURE — 25800030 ZZH RX IP 258 OP 636: Performed by: INTERNAL MEDICINE

## 2020-04-23 PROCEDURE — B240ZZ3 ULTRASONOGRAPHY OF SINGLE CORONARY ARTERY, INTRAVASCULAR: ICD-10-PCS | Performed by: INTERNAL MEDICINE

## 2020-04-23 PROCEDURE — C9600 PERC DRUG-EL COR STENT SING: HCPCS | Performed by: INTERNAL MEDICINE

## 2020-04-23 PROCEDURE — 21000001 ZZH R&B HEART CARE

## 2020-04-23 PROCEDURE — 99222 1ST HOSP IP/OBS MODERATE 55: CPT | Mod: 25 | Performed by: INTERNAL MEDICINE

## 2020-04-23 PROCEDURE — 25000125 ZZHC RX 250: Performed by: INTERNAL MEDICINE

## 2020-04-23 PROCEDURE — 93018 CV STRESS TEST I&R ONLY: CPT | Performed by: INTERNAL MEDICINE

## 2020-04-23 PROCEDURE — 93321 DOPPLER ECHO F-UP/LMTD STD: CPT | Mod: TC

## 2020-04-23 PROCEDURE — C1769 GUIDE WIRE: HCPCS | Performed by: INTERNAL MEDICINE

## 2020-04-23 DEVICE — STENT SYNERGY DRUG ELUTING 4.00X16MM  H7493926016400: Type: IMPLANTABLE DEVICE | Status: FUNCTIONAL

## 2020-04-23 RX ORDER — IOPAMIDOL 755 MG/ML
INJECTION, SOLUTION INTRAVASCULAR
Status: DISCONTINUED | OUTPATIENT
Start: 2020-04-23 | End: 2020-04-23 | Stop reason: HOSPADM

## 2020-04-23 RX ORDER — LORAZEPAM 2 MG/ML
0.5 INJECTION INTRAMUSCULAR
Status: DISCONTINUED | OUTPATIENT
Start: 2020-04-23 | End: 2020-04-23

## 2020-04-23 RX ORDER — ACETAMINOPHEN 325 MG/1
650 TABLET ORAL EVERY 4 HOURS PRN
Status: DISCONTINUED | OUTPATIENT
Start: 2020-04-23 | End: 2020-04-23

## 2020-04-23 RX ORDER — NITROGLYCERIN 5 MG/ML
VIAL (ML) INTRAVENOUS
Status: DISCONTINUED | OUTPATIENT
Start: 2020-04-23 | End: 2020-04-23 | Stop reason: HOSPADM

## 2020-04-23 RX ORDER — NALOXONE HYDROCHLORIDE 0.4 MG/ML
.1-.4 INJECTION, SOLUTION INTRAMUSCULAR; INTRAVENOUS; SUBCUTANEOUS
Status: DISCONTINUED | OUTPATIENT
Start: 2020-04-23 | End: 2020-04-24 | Stop reason: HOSPADM

## 2020-04-23 RX ORDER — POTASSIUM CHLORIDE 1500 MG/1
20 TABLET, EXTENDED RELEASE ORAL
Status: DISCONTINUED | OUTPATIENT
Start: 2020-04-23 | End: 2020-04-23

## 2020-04-23 RX ORDER — ATROPINE SULFATE 0.1 MG/ML
0.5 INJECTION INTRAVENOUS EVERY 5 MIN PRN
Status: ACTIVE | OUTPATIENT
Start: 2020-04-23 | End: 2020-04-24

## 2020-04-23 RX ORDER — VERAPAMIL HYDROCHLORIDE 2.5 MG/ML
INJECTION, SOLUTION INTRAVENOUS
Status: DISCONTINUED | OUTPATIENT
Start: 2020-04-23 | End: 2020-04-23 | Stop reason: HOSPADM

## 2020-04-23 RX ORDER — LIDOCAINE 40 MG/G
CREAM TOPICAL
Status: DISCONTINUED | OUTPATIENT
Start: 2020-04-23 | End: 2020-04-23

## 2020-04-23 RX ORDER — ATORVASTATIN CALCIUM 40 MG/1
40 TABLET, FILM COATED ORAL EVERY EVENING
Status: DISCONTINUED | OUTPATIENT
Start: 2020-04-23 | End: 2020-04-24 | Stop reason: HOSPADM

## 2020-04-23 RX ORDER — NITROGLYCERIN 0.4 MG/1
0.4 TABLET SUBLINGUAL EVERY 5 MIN PRN
Status: DISCONTINUED | OUTPATIENT
Start: 2020-04-23 | End: 2020-04-24 | Stop reason: HOSPADM

## 2020-04-23 RX ORDER — NALOXONE HYDROCHLORIDE 0.4 MG/ML
.2-.4 INJECTION, SOLUTION INTRAMUSCULAR; INTRAVENOUS; SUBCUTANEOUS
Status: ACTIVE | OUTPATIENT
Start: 2020-04-23 | End: 2020-04-24

## 2020-04-23 RX ORDER — FENTANYL CITRATE 50 UG/ML
INJECTION, SOLUTION INTRAMUSCULAR; INTRAVENOUS
Status: DISCONTINUED | OUTPATIENT
Start: 2020-04-23 | End: 2020-04-23 | Stop reason: HOSPADM

## 2020-04-23 RX ORDER — FLUMAZENIL 0.1 MG/ML
0.2 INJECTION, SOLUTION INTRAVENOUS
Status: ACTIVE | OUTPATIENT
Start: 2020-04-23 | End: 2020-04-24

## 2020-04-23 RX ORDER — HEPARIN SODIUM 1000 [USP'U]/ML
INJECTION, SOLUTION INTRAVENOUS; SUBCUTANEOUS
Status: DISCONTINUED | OUTPATIENT
Start: 2020-04-23 | End: 2020-04-23 | Stop reason: HOSPADM

## 2020-04-23 RX ORDER — FENTANYL CITRATE 50 UG/ML
25-50 INJECTION, SOLUTION INTRAMUSCULAR; INTRAVENOUS
Status: ACTIVE | OUTPATIENT
Start: 2020-04-23 | End: 2020-04-24

## 2020-04-23 RX ORDER — ASPIRIN 81 MG/1
81 TABLET ORAL DAILY
Status: DISCONTINUED | OUTPATIENT
Start: 2020-04-24 | End: 2020-04-24 | Stop reason: HOSPADM

## 2020-04-23 RX ORDER — LORAZEPAM 0.5 MG/1
0.5 TABLET ORAL
Status: DISCONTINUED | OUTPATIENT
Start: 2020-04-23 | End: 2020-04-23

## 2020-04-23 RX ORDER — SODIUM CHLORIDE 9 MG/ML
INJECTION, SOLUTION INTRAVENOUS CONTINUOUS
Status: ACTIVE | OUTPATIENT
Start: 2020-04-23 | End: 2020-04-24

## 2020-04-23 RX ORDER — ATORVASTATIN CALCIUM 40 MG/1
40 TABLET, FILM COATED ORAL DAILY
Status: DISCONTINUED | OUTPATIENT
Start: 2020-04-23 | End: 2020-04-23

## 2020-04-23 RX ORDER — METOPROLOL SUCCINATE 50 MG/1
50 TABLET, EXTENDED RELEASE ORAL DAILY
Status: DISCONTINUED | OUTPATIENT
Start: 2020-04-23 | End: 2020-04-24 | Stop reason: HOSPADM

## 2020-04-23 RX ORDER — SODIUM CHLORIDE 9 MG/ML
INJECTION, SOLUTION INTRAVENOUS CONTINUOUS
Status: DISCONTINUED | OUTPATIENT
Start: 2020-04-23 | End: 2020-04-23

## 2020-04-23 RX ADMIN — HEPARIN SODIUM 4000 UNITS: 1000 INJECTION INTRAVENOUS; SUBCUTANEOUS at 15:56

## 2020-04-23 RX ADMIN — HUMAN ALBUMIN MICROSPHERES AND PERFLUTREN 9 ML: 10; .22 INJECTION, SOLUTION INTRAVENOUS at 08:45

## 2020-04-23 RX ADMIN — VERAPAMIL HYDROCHLORIDE 2.5 MG: 2.5 INJECTION, SOLUTION INTRAVENOUS at 15:42

## 2020-04-23 RX ADMIN — MIDAZOLAM 0.5 MG: 1 INJECTION INTRAMUSCULAR; INTRAVENOUS at 15:45

## 2020-04-23 RX ADMIN — HEPARIN SODIUM 2000 UNITS: 1000 INJECTION INTRAVENOUS; SUBCUTANEOUS at 16:17

## 2020-04-23 RX ADMIN — ATORVASTATIN CALCIUM 40 MG: 40 TABLET, FILM COATED ORAL at 20:31

## 2020-04-23 RX ADMIN — NITROGLYCERIN 400 MCG: 5 INJECTION, SOLUTION INTRAVENOUS at 16:15

## 2020-04-23 RX ADMIN — FENTANYL CITRATE 50 MCG: 50 INJECTION, SOLUTION INTRAMUSCULAR; INTRAVENOUS at 16:14

## 2020-04-23 RX ADMIN — Medication 1 LOZENGE: at 09:31

## 2020-04-23 RX ADMIN — MIDAZOLAM 0.5 MG: 1 INJECTION INTRAMUSCULAR; INTRAVENOUS at 16:14

## 2020-04-23 RX ADMIN — IOPAMIDOL 120 ML: 755 INJECTION, SOLUTION INTRAVENOUS at 16:20

## 2020-04-23 RX ADMIN — FENTANYL CITRATE 25 MCG: 50 INJECTION, SOLUTION INTRAMUSCULAR; INTRAVENOUS at 15:45

## 2020-04-23 RX ADMIN — FENTANYL CITRATE 50 MCG: 50 INJECTION, SOLUTION INTRAMUSCULAR; INTRAVENOUS at 15:31

## 2020-04-23 RX ADMIN — MIDAZOLAM 1 MG: 1 INJECTION INTRAMUSCULAR; INTRAVENOUS at 15:31

## 2020-04-23 RX ADMIN — MIDAZOLAM 0.5 MG: 1 INJECTION INTRAMUSCULAR; INTRAVENOUS at 15:53

## 2020-04-23 RX ADMIN — HEPARIN SODIUM 7000 UNITS: 1000 INJECTION INTRAVENOUS; SUBCUTANEOUS at 15:44

## 2020-04-23 RX ADMIN — NITROGLYCERIN 200 MCG: 5 INJECTION, SOLUTION INTRAVENOUS at 15:42

## 2020-04-23 RX ADMIN — METOPROLOL SUCCINATE 50 MG: 50 TABLET, EXTENDED RELEASE ORAL at 20:31

## 2020-04-23 RX ADMIN — TICAGRELOR 180 MG: 90 TABLET ORAL at 16:13

## 2020-04-23 RX ADMIN — FENTANYL CITRATE 25 MCG: 50 INJECTION, SOLUTION INTRAMUSCULAR; INTRAVENOUS at 15:53

## 2020-04-23 RX ADMIN — ASPIRIN 325 MG ORAL TABLET 325 MG: 325 PILL ORAL at 09:16

## 2020-04-23 RX ADMIN — SODIUM CHLORIDE: 9 INJECTION, SOLUTION INTRAVENOUS at 00:05

## 2020-04-23 RX ADMIN — LIDOCAINE HYDROCHLORIDE 1 ML: 10 INJECTION, SOLUTION EPIDURAL; INFILTRATION; INTRACAUDAL; PERINEURAL at 15:41

## 2020-04-23 ASSESSMENT — MIFFLIN-ST. JEOR: SCORE: 2026.44

## 2020-04-23 ASSESSMENT — ACTIVITIES OF DAILY LIVING (ADL)
ADLS_ACUITY_SCORE: 10

## 2020-04-23 NOTE — PROGRESS NOTES
[facility]    Hospitalist Progress Note    Date of Admission:  4/22/2020    Assessment & Plan     Patient was admitted with concern for unstable angina.  Risk factors include age, hyperlipidemia, obesity, family history.  Character of pain also sounded typical for angina.  He was treated with aspirin, placed on heparin drip overnight.  Chest x-ray showed no acute infiltrate or congestive changes.  D-dimer was negative.  He ruled out for ACS with serial negative troponins and EKGs.  Heparin was discontinued in the morning.  He underwent stress echocardiogram in the morning that was negative for ischemia.  Cardiology was consulted.  Given that he was higher risk for CAD, he underwent cardiac catheterization on 4/23.  This showed :  1.  Moderate proximal RCA stenosis by angiography, but with clear ruptured plaque on OCT.  2.  Successful PCI of the proximal RCA ruptured plaque with a 4.0 x 16 mm Synergy LUZ, postdilated to 4.7.  3.  Mild nonobstructive CAD elsewhere.     -Started on DAPT, recommended for 6 months.   -Low dose BB started.  -Referral to Cardiac Rehab.   For his complaints of dysphagia, patient was seen by GI.  Recommendation was made to pursue outpatient EGD.  No need for urgent intervention at this time.       He was started on atorvastatin for hyperlipidemia.    Anticipate discharge home tomorrow.      Brandie Gupta MD  Text Page (7am - 6pm, M-F)    Interval History   Stable overnight and chest pain free. Eager to go home. No sob.     -Data reviewed today: I reviewed all new labs and imaging results over the last 24 hours. I personally reviewed EKG with result as noted above    Physical Exam   Temp: 96.7  F (35.9  C) Temp src: Axillary BP: 120/69 Pulse: 69 Heart Rate: 75 Resp: 15 SpO2: 98 % O2 Device: None (Room air) Oxygen Delivery: 2 LPM  Vitals:    04/22/20 1141 04/23/20 0738   Weight: 108.9 kg (240 lb) 114.7 kg (252 lb 12.8 oz)     Vital Signs with Ranges  Temp:  [96.7  F (35.9   C)-98.7  F (37.1  C)] 96.7  F (35.9  C)  Pulse:  [68-87] 69  Heart Rate:  [63-89] 75  Resp:  [15-16] 15  BP: (112-146)/() 120/69  SpO2:  [96 %-100 %] 98 %  I/O last 3 completed shifts:  In: 960 [P.O.:960]  Out: -     Constitutional: Alert, appears comfortable, in no acute distress  Respiratory: Non labored breathing, clear to auscultation bilaterally, no crackles or wheezes  Cardiovascular: Heart sounds regular rate and rhythm, no murmurs, no leg edema  GI: Abdomen is soft, non distended, non tender. Normal BS  Skin/Integumen: no rashes, no pressure sores  Neuro: alert, converses appropriately, moving all extremities, fluent speech, no facial asymmetry  Psych: mood and affect appropriate      Medications     - MEDICATION INSTRUCTIONS -       ACE/ARB/ARNI NOT PRESCRIBED       sodium chloride         aspirin  325 mg Oral Daily     atorvastatin  40 mg Oral QPM     pantoprazole  40 mg Oral QAM AC     sodium chloride (PF)  3 mL Intracatheter Q8H       Data   Recent Labs   Lab 04/23/20  0104 04/22/20  2041 04/22/20  1656 04/22/20  1420 04/22/20  1222   WBC  --   --  8.1  --  6.5   HGB  --   --  15.8  --  15.9   MCV  --   --  89  --  88   PLT  --   --  245  --  241   NA  --   --   --   --  137   POTASSIUM  --   --   --   --  3.8   CHLORIDE  --   --   --   --  107   CO2  --   --   --   --  23   BUN  --   --   --   --  13   CR  --   --   --   --  0.69   ANIONGAP  --   --   --   --  7   DANIELITO  --   --   --   --  9.4   GLC  --   --   --   --  97   ALBUMIN  --   --   --   --  4.2   PROTTOTAL  --   --   --   --  7.7   BILITOTAL  --   --   --   --  0.8   ALKPHOS  --   --   --   --  48   ALT  --   --   --   --  30   AST  --   --   --   --  19   LIPASE  --   --   --   --  77   TROPI <0.015 <0.015  --  <0.015 <0.015       Imaging  Recent Results (from the past 24 hour(s))   Echocardiogram Complete    Narrative    [id number]  [id number]  [id number]^CARMINA^SACHIN^DANIEL           [facility]  Echocardiography  Laboratory  [address] [place] [id number]        Name: NATE OLEA  MRN: [id number]  : 1960  Study Date: 2020 08:21 AM  Age: 60 yrs  Gender: Male  Patient Location: [facility]  Reason For Study: Chest Pain, Chest Tightness, Chest Pressure  Ordering Physician: SACHIN GREWAL  Referring Physician: SACHIN GREWAL  Performed By: [facility] Lolita Best     BSA: 2.4 m2  Height: 74 in  Weight: 252 lb  HR: 75  BP: 137/85 mmHg  _____________________________________________________________________________  __        Procedure  Optison (NDC #[id number]) given intravenously. Complete Portable Echo Adult.  _____________________________________________________________________________  __        Interpretation Summary     Technically difficult iimaging.  No regional wall motion abnormalities noted.  The pericardium appears normal.  The aortic root is normal size.  Hyperdynamic left ventricular function  The visual ejection fraction is estimated at >70%.  There is mild concentric left ventricular hypertrophy.  The right ventricle is normal in structure, function and size.  Doppler interrogation does not demonstrate signficant stenoisis or  insufficiency involving cardiac valves.     No old studies availble for comparison.  _____________________________________________________________________________  __        Left Ventricle  The left ventricle is normal in size. There is mild concentric left  ventricular hypertrophy. Hyperdynamic left ventricular function. The visual  ejection fraction is estimated at >70%. Grade I or early diastolic  dysfunction. No regional wall motion abnormalities noted. There is no thrombus  seen in the left ventricle.     Right Ventricle  The right ventricle is normal in structure, function and size. There is no  mass or thrombus in the right ventricle.     Atria  Normal left atrial size. Right atrial size is normal. There is no atrial shunt  seen. The left atrial appendage is not well  visualized.     Mitral Valve  The mitral valve leaflets appear normal. There is no evidence of stenosis,  fluttering, or prolapse. There is no mitral regurgitation noted. There is no  mitral valve stenosis.        Tricuspid Valve  Normal tricuspid valve. No tricuspid regurgitation. Right ventricular systolic  pressure could not be approximated due to inadequate tricuspid regurgitation.  There is no tricuspid stenosis.     Aortic Valve  There is mild trileaflet aortic sclerosis. No aortic regurgitation is present.  No aortic stenosis is present.     Pulmonic Valve  The pulmonic valve is not well seen, but is grossly normal. There is no  pulmonic valvular regurgitation. There is no pulmonic valvular stenosis.     Vessels  The aortic root is normal size. Inferior vena cava not well visualized for  estimation of right atrial pressure. The pulmonary artery is normal size.     Pericardium  The pericardium appears normal. There is no pleural effusion.        Rhythm  Sinus rhythm was noted.  _____________________________________________________________________________  __  MMode/2D Measurements & Calculations  IVSd: 1.3 cm     LVIDd: 3.5 cm  LVIDs: 2.0 cm  LVPWd: 1.4 cm  FS: 42.9 %  LV mass(C)d: 163.9 grams  LV mass(C)dI: 68.4 grams/m2  Ao root diam: 3.7 cm  LA dimension: 4.8 cm  asc Aorta Diam: 3.3 cm  LA/Ao: 1.3  LVOT diam: 2.4 cm  LVOT area: 4.5 cm2  LA Volume (BP): 60.0 ml  LA Volume Indexed (AL/bp): 26.4 ml/m2  RWT: 0.78           Doppler Measurements & Calculations  MV E max bethel: 66.5 cm/sec  MV A max bethel: 73.4 cm/sec  MV E/A: 0.91  MV dec time: 0.17 sec  PA acc time: 0.13 sec  E/E' av.2  Lateral E/e': 5.2  Medial E/e': 7.2           _____________________________________________________________________________  __           Report approved by: Dr. Tres Harris 2020 09:18 AM      Echo Stress Echocardiogram    Narrative    [id number]  [id number]  [id number]  [id number]^ALESSANDRO^DANIEL           [facility]  [facility]  Echocardiography Laboratory  [address] [place] [address]        Name: NATE OLEA  MRN: [id number]  : 1960  Study Date: 2020 08:33 AM  Age: 60 yrs  Gender: Male  Patient Location: [facility]  Reason For Study: Unstable Angina  Ordering Physician: SACHIN GREWAL  Referring Physician: Ivelisse PCP  Performed By: FARHANA Best     BSA: 2.3 m2  Height: 74 in  Weight: 240 lb  HR: 94  BP: 143/88 mmHg  _____________________________________________________________________________  __        Procedure  Contrast Optison. Stress Echo Complete.  _____________________________________________________________________________  __        Interpretation Summary  The patient exhibited no chest pain during exercise.  The Duke treadmill score was low risk ( >5 Evans score).  The study was technically difficult. This was a normal stress echocardiogram  with no evidence of stress-induced ischemia. This was a normal stress EKG with  no evidence of stress-induced ischemia.  _____________________________________________________________________________  __     Stress  The patient exercised 6:00 min.  A low to moderate workload was achieved.  RPP [id number].  Exercise was stopped due to fatigue.  The patient exhibited dyspnea during exercise.  There was a normal BP response to exercise.  The patient exhibited no chest pain during exercise.  Target Heart Rate was achieved.  A treadmill exercise test according to the Ramiro protocol was performed.  The Duke treadmill score was low risk ( >5 Duke score).  The EKG portion of this stress test was negative for inducible ischemia (see  echo results below).  No arrhythmia noted.  The visual ejection fraction is estimated at >70%.  Left ventricular cavity size decreases with exercise.  Global LV systolic function augments with exercise.  Normal resting wall motion and no stress-induced wall motion abnormality.     Baseline  The patient is in normal sinus rhythm.  IVCD in AVL  and V2.  The visual ejection fraction is estimated at 60-65%.  No regional wall motion abnormalities noted.     Stress Results         Protocol:  Ramiro Protocol        Maximum Predicted HR:   160 bpm         Target HR: 136 bpm               % Maximum Predicted HR: 100 %       Stage  DurationHeart Rate  BP                     Comment            (mm:ss)   (bpm)   Stage 1   3:00      137   158/60   Stage 2   3:00      160   180/60FAC; below average, Duke score: 6 (low risk)  RecoveryR  4:00      108   140/88SOB improved by 1 min. post                Stress Duration:   6:00 mm:ss        Recovery Time: 4:00 mm:ss          Maximum Stress HR: 160 bpm           METS:          7     Mitral Valve  There is trace mitral regurgitation.        Tricuspid Valve  There is trace tricuspid regurgitation.     Aortic Valve  The aortic valve is not well visualized. No aortic regurgitation is present.  No hemodynamically significant valvular aortic stenosis.  _____________________________________________________________________________  __                                Report approved by: Darrell Doherty 04/23/2020 09:57 AM                    _____________________________________________________________________________  __      Cardiac Catheterization    Narrative    1.  Moderate proximal RCA stenosis by angiography, but with clear ruptured   plaque on OCT.  2.  Successful PCI of the proximal RCA ruptured plaque with a 4.0 x 16 mm   Synergy LUZ, postdilated to 4.7.  3.  Mild nonobstructive CAD elsewhere.

## 2020-04-23 NOTE — CONSULTS
[facility]  Cardiology Consultation     Date of Admission:  4/22/2020    Assessment & Plan   Aamdo Veliz is a 60 year old male with    1.  Angina  2.  Hyperlipidemia,  mg/dL-10-year risk of ASCVD risk score of 21% for cardiovascular event.  3.  13 pack years of smoking, quit in 1993    Discussion    What patient described as like a typical angina which is precipitated by exertion and gets relieved with rest.  However, over the last week it has been occurring more frequently and even during rest raising suspicion for unstable angina.  It is quite possible that this may be related to anxiety as patient has been overly concerned about his symptoms.  In the hospital troponins are negative, EKG is nonischemic and stress echocardiogram showed no wall motion normalities at target heart rate although, the stress images were suboptimal and admittedly concerned about the inferior wall motion.  Furthermore, what makes me worried the long history of untreated hyperlipidemia with an LDL of 160 mg/dL.  Looking back, LDL has been high to over 140s even in 2002.    I discussed all this with the patient in great detail.  To the patient that he is not having any acute coronary event.  Although, his symptoms make me a little worried even though the troponins are negative, EKG is normal and stress test did not show wall motion abnormalities.  Breasts again, as noted earlier the images of the stress test were slightly suboptimal and I am not sure of the wall motion of the inferior segment.  I gave him 3 options.  First, do not do any further testing and except the results of the stress test knowing that they can have false positive and negatives.  Second, we can perform coronary angiogram to rule out possibility of coronary artery disease completely.  Third, do further testing with CT coronary angiogram.  I discussed the risks and benefits of all of these and the patient would like to proceed with a  coronary angiogram.     Risks and benefits of left heart catheterization and coronary angiogram were discussed with the patient in detail.  I explained to the patient that the risk involved but were not limited to stroke, MI, death, emergent bypass, risk of contrast induced allergic reaction, renal dysfunction and vascular complications.  Alternatives to performing coronary angiogram were also discussed.  Patient understands and wishes to proceed with [facility].      Plan  1.  Left catheterization with coronary angiogram and PCI if needed- Radial approach  2.  Moderate intensity statin, 40 mg of Lipitor  3.  Low-dose aspirin    Abelino Wayne    Code Status    Full Code    Reason for Consult   Reason for consult: Angina    Primary Care Physician   Physician No Ref-Primary    Chief Complaint   Chest pain    History of Present Illness   Amado Veliz is a 60 year old male who presents with past medical history of untreated hyperlipidemia, 13 pack years of smoking quit in 1993 who presents to the hospital for ongoing chest pain.    The patient started walking a month ago and him immediately noted substernal, pressure-like chest pain on going uphill which would last for 5 to 6 minutes and resolve spontaneously.  The pain resolved even when he continued to walk.  This will happen every day during the walks.  He looked this up on the computer and concluded that he was having angina.  Hence, he stopped going up the hill.  This led to improvement in the pain which now occurred less often.  However, over the last 1 week he has noticed this pain on and off even at rest although it is associated with exertion most of the time.  Yesterday the pain lasted for over 45 minutes which made him worried and hence he sought medical care.    As far as he can remember he has had elevated cholesterol for which she has never been prescribed statins.  He has family history of blood clots in his sister and father and stroke in his grandfather  but no history of premature coronary artery disease.  He believes he is out of shape which may be contributing to the chest pain.    In the hospital, blood pressure was normal, LDL was~160 mg/dL, total cholesterol 231 mg/dL, HDL 36 mg/dL, normal hemoglobin and creatinine.  EKG showed sinus rhythm with no ST changes.  Troponin was negative x3.  A stress test was performed during which he exercised for total of 6 minutes reaching the target heart rate.  Did not report chest discomfort with stress and imaging part of the test was reported as negative although it was noted that the image quality was suboptimal.    Patient Active Problem List   Diagnosis     Hemorrhage of rectum and anus     Mixed hyperlipidemia     HYPERLIPIDEMIA LDL GOAL <160     Unstable angina (H)     Pharyngoesophageal dysphagia       Past Medical History   I have reviewed this patient's medical history and updated it with pertinent information if needed.   Past Medical History:   Diagnosis Date     Intestinal infection due to Clostridium difficile      Mixed hyperlipidemia        Past Surgical History   I have reviewed this patient's surgical history and updated it with pertinent information if needed.  Past Surgical History:   Procedure Laterality Date     BACK SURGERY      L5-S1 disk herniation      NO HISTORY OF SURGERY         Prior to Admission Medications   Prior to Admission Medications   Prescriptions Last Dose Informant Patient Reported? Taking?   multivitamin w/minerals (THERA-VIT-M) tablet 4/22/2020 at am  Yes Yes   Sig: Take 1 tablet by mouth daily      Facility-Administered Medications: None     Current Facility-Administered Medications   Medication Dose Route Frequency     aspirin  325 mg Oral Daily     pantoprazole  40 mg Oral QAM AC     simvastatin  20 mg Oral QPM     sodium chloride (PF)  3 mL Intracatheter Q8H     Current Facility-Administered Medications   Medication Last Rate     ACE/ARB/ARNI NOT PRESCRIBED       Allergies    Allergies   Allergen Reactions     Niacin      flushing       Social History    reports that he quit smoking about 28 years ago. He quit after 13.00 years of use. He has never used smokeless tobacco. He reports that he does not drink alcohol or use drugs.    Family History   Family History   Problem Relation Age of Onset     Family History Negative Father      Deep Vein Thrombosis Father      Atrial fibrillation Father      Family History Negative Mother      Family History Negative Brother      Deep Vein Thrombosis Sister      Ovarian Cancer Maternal Grandmother      Coronary Artery Disease Maternal Grandfather        Review of Systems   The comprehensive 10 point Review of Systems is negative other than noted in the HPI or here.     Physical Exam   Temp: 97.6  F (36.4  C) Temp src: Oral BP: 137/85 Pulse: 79 Heart Rate: 80 Resp: 16 SpO2: 97 % O2 Device: None (Room air)    Vital Signs with Ranges  Temp:  [97.5  F (36.4  C)-98.7  F (37.1  C)] 97.6  F (36.4  C)  Pulse:  [65-79] 79  Heart Rate:  [62-80] 80  Resp:  [10-18] 16  BP: (137-172)/(69-99) 137/85  SpO2:  [95 %-99 %] 97 %  252 lbs 12.8 oz    Constitutional: Alert, no apparent distress,    Lungs: Normal breath sounds   Cardiovascular: Regular rate and rhythm, normal S1 and S2, and no murmur,    Lymphm node  Neck  ENT  Neurologic  Abdomen: No enlargement  No jugular vein extension or carotid bruit  No apparent abnormality  No focal deficit  Normal bowel sounds, soft, no distension, no tender   Skin: Normal    Extremity: No edema       Data   Results for orders placed or performed during the hospital encounter of 04/22/20 (from the past 24 hour(s))   EKG 12 lead   Result Value Ref Range    Interpretation ECG Click View Image link to view waveform and result    CBC with platelets differential   Result Value Ref Range    WBC 6.5 4.0 - 11.0 10e9/L    RBC Count 5.03 4.4 - 5.9 10e12/L    Hemoglobin 15.9 13.3 - 17.7 g/dL    Hematocrit 44.1 40.0 - 53.0 %    MCV 88 78 -  100 fl    MCH 31.6 26.5 - 33.0 pg    MCHC 36.1 31.5 - 36.5 g/dL    RDW 12.6 10.0 - 15.0 %    Platelet Count 241 150 - 450 10e9/L    Diff Method Automated Method     % Neutrophils 73.2 %    % Lymphocytes 20.4 %    % Monocytes 5.3 %    % Eosinophils 0.6 %    % Basophils 0.3 %    % Immature Granulocytes 0.2 %    Nucleated RBCs 0 0 /100    Absolute Neutrophil 4.7 1.6 - 8.3 10e9/L    Absolute Lymphocytes 1.3 0.8 - 5.3 10e9/L    Absolute Monocytes 0.3 0.0 - 1.3 10e9/L    Absolute Eosinophils 0.0 0.0 - 0.7 10e9/L    Absolute Basophils 0.0 0.0 - 0.2 10e9/L    Abs Immature Granulocytes 0.0 0 - 0.4 10e9/L    Absolute Nucleated RBC 0.0    Troponin I   Result Value Ref Range    Troponin I ES <0.015 0.000 - 0.045 ug/L   Comprehensive metabolic panel   Result Value Ref Range    Sodium 137 133 - 144 mmol/L    Potassium 3.8 3.4 - 5.3 mmol/L    Chloride 107 94 - 109 mmol/L    Carbon Dioxide 23 20 - 32 mmol/L    Anion Gap 7 3 - 14 mmol/L    Glucose 97 70 - 99 mg/dL    Urea Nitrogen 13 7 - 30 mg/dL    Creatinine 0.69 0.66 - 1.25 mg/dL    GFR Estimate >90 >60 mL/min/[1.73_m2]    GFR Estimate If Black >90 >60 mL/min/[1.73_m2]    Calcium 9.4 8.5 - 10.1 mg/dL    Bilirubin Total 0.8 0.2 - 1.3 mg/dL    Albumin 4.2 3.4 - 5.0 g/dL    Protein Total 7.7 6.8 - 8.8 g/dL    Alkaline Phosphatase 48 40 - 150 U/L    ALT 30 0 - 70 U/L    AST 19 0 - 45 U/L   Lipase   Result Value Ref Range    Lipase 77 73 - 393 U/L   D dimer quantitative   Result Value Ref Range    D Dimer 0.5 0.0 - 0.50 ug/ml FEU   Hemoglobin A1c   Result Value Ref Range    Hemoglobin A1C 5.0 0 - 5.6 %   Chest XR,  PA & LAT    Narrative    CHEST TWO VIEWS April 22, 2020 1:41 PM     HISTORY: Chest pain.    COMPARISON: None.       Impression    IMPRESSION: There are no acute infiltrates. The cardiac silhouette is  not enlarged. Pulmonary vasculature is unremarkable.    DAVIN SALCEDO MD   Troponin I (now)   Result Value Ref Range    Troponin I ES <0.015 0.000 - 0.045 ug/L    Gastroenterology IP Consult: dysphagia; Consultant may enter orders: Yes; Patient to be seen: Routine - within 24 hours; Requested Clinic/Group: Dr. [facility]; Requesting provider? Hospitalist (if different from attending physician)    Ghada Sosa MD     4/23/2020  8:46 AM  [facility]    Gastroenterology Consultation    Date of Admission:  4/22/2020    History of Present Illness   Amado Veliz is a 60 year old male who presents with chest   pain, GI consulted for dysphagia. PMH of HLD and 15 pack year hx   here due to chest pain originally exertional and now even at   rest. During the hospital stay, he also endorses to have   dysphagia.    Dysphagia is to solid but not liquid, it has been there for more   than 10 years which he describes as he can feel food going down   slowly and with the tendency that it may get stuck and need to   use extra water to flush it down. However the sx has been getting   worse and worse lately which he intends to find a   gastroenterologist but didn't get a chance to do it. He dose   modify his habit of chewing food thoroughly and no longer eating   bulky object such as steak.    He denies any atopic disease besides allergy to grass when he was   younger, denies reflux sx.    Assessment & Plan   Amado Veliz is a 60 year old male who was admitted on   4/22/2020. I was asked to see the patient for dysphagia.  -to solid but not liquid hence most likely structural issue   rather than peristalsis  -given pt has sx for a long time, will have pt finished cardiac   work up first  -pt wishes to do work up as outpatient hence please have pt   follow up with outpatient EGD  -can continue with regular food, pt is mindful about chewing food   thoroughly    CPT: 99917    Active Problems:    Unstable angina (H)    Pharyngoesophageal dysphagia      Ghada Garcia MD  ([facility]  Office: [phone]  Cell: [phone],  please feel free to call the cell    Code Status    Full Code      Primary Care Physician   Physician No Ref-Primary      Ghada Garcia MD  ([facility])  [facility]  Office: [phone]  Cell: [phone], please feel free to call the cell      Past Medical History   I have reviewed this patient's medical history and updated it   with pertinent information if needed.   Past Medical History:   Diagnosis Date     Intestinal infection due to Clostridium difficile      Mixed hyperlipidemia        Past Surgical History   I have reviewed this patient's surgical history and updated it   with pertinent information if needed.  Past Surgical History:   Procedure Laterality Date     BACK SURGERY      L5-S1 disk herniation      NO HISTORY OF SURGERY         Prior to Admission Medications   Prior to Admission Medications   Prescriptions Last Dose Informant Patient Reported? Taking?   multivitamin w/minerals (THERA-VIT-M) tablet 4/22/2020 at am  Yes   Yes   Sig: Take 1 tablet by mouth daily      Facility-Administered Medications: None     Allergies   Allergies   Allergen Reactions     Niacin      flushing       Social History   I have reviewed this patient's social history and updated it with   pertinent information if needed. Amado LEIDY Veliz  reports that   he quit smoking about 28 years ago. He quit after 13.00 years of   use. He has never used smokeless tobacco. He reports that he does   not drink alcohol or use drugs.    Family History   I have reviewed this patient's family history and updated it with   pertinent information if needed.   Family History   Problem Relation Age of Onset     Family History Negative Father      Deep Vein Thrombosis Father      Atrial fibrillation Father      Family History Negative Mother      Family History Negative Brother      Deep Vein Thrombosis Sister      Ovarian Cancer Maternal Grandmother      Coronary Artery Disease Maternal Grandfather        Review of  Systems   The 10 point Review of Systems is negative other than noted in   the HPI or here.     Physical Exam   Temp: 97.6  F (36.4  C) Temp src: Oral BP: 137/85 Pulse: 79 Heart   Rate: 80 Resp: 16 SpO2: 97 % O2 Device: None (Room air)    Vital Signs with Ranges  Temp:  [97.5  F (36.4  C)-98.7  F (37.1  C)] 97.6  F (36.4  C)  Pulse:  [65-79] 79  Heart Rate:  [62-80] 80  Resp:  [10-18] 16  BP: (137-172)/(69-99) 137/85  SpO2:  [95 %-99 %] 97 %  252 lbs 12.8 oz    Exam:  Constitutional: healthy, alert and no distress  Head: Normocephalic. No masses, lesions, tenderness or   abnormalities  Neck: Neck supple. No adenopathy. Thyroid symmetric, normal   size,, Carotids without bruits.  ENT: ENT exam normal, no neck nodes or sinus tenderness  Cardiovascular: negative, PMI normal. No lifts, heaves, or   thrills. RRR. No murmurs, clicks gallops or rub  Respiratory: negative, Percussion normal. Good diaphragmatic   excursion. Lungs clear  Gastrointestinal: Abdomen soft, non-tender. BS normal. No masses,   organomegaly  : Deferred  Musculoskeletal: extremities normal- no gross deformities noted,   gait normal and normal muscle tone  Skin: no suspicious lesions or rashes  Neurologic: Gait normal. Reflexes normal and symmetric. Sensation   grossly WNL.  Psychiatric: mentation appears normal and affect normal/bright  Hematologic/Lymphatic/Immunologic: Normal cervical lymph nodes     Data   Results for orders placed or performed during the hospital   encounter of 04/22/20 (from the past 24 hour(s))   EKG 12 lead   Result Value Ref Range    Interpretation ECG Click View Image link to view waveform and   result    CBC with platelets differential   Result Value Ref Range    WBC 6.5 4.0 - 11.0 10e9/L    RBC Count 5.03 4.4 - 5.9 10e12/L    Hemoglobin 15.9 13.3 - 17.7 g/dL    Hematocrit 44.1 40.0 - 53.0 %    MCV 88 78 - 100 fl    MCH 31.6 26.5 - 33.0 pg    MCHC 36.1 31.5 - 36.5 g/dL    RDW 12.6 10.0 - 15.0 %    Platelet Count 241 150 -  450 10e9/L    Diff Method Automated Method     % Neutrophils 73.2 %    % Lymphocytes 20.4 %    % Monocytes 5.3 %    % Eosinophils 0.6 %    % Basophils 0.3 %    % Immature Granulocytes 0.2 %    Nucleated RBCs 0 0 /100    Absolute Neutrophil 4.7 1.6 - 8.3 10e9/L    Absolute Lymphocytes 1.3 0.8 - 5.3 10e9/L    Absolute Monocytes 0.3 0.0 - 1.3 10e9/L    Absolute Eosinophils 0.0 0.0 - 0.7 10e9/L    Absolute Basophils 0.0 0.0 - 0.2 10e9/L    Abs Immature Granulocytes 0.0 0 - 0.4 10e9/L    Absolute Nucleated RBC 0.0    Troponin I   Result Value Ref Range    Troponin I ES <0.015 0.000 - 0.045 ug/L   Comprehensive metabolic panel   Result Value Ref Range    Sodium 137 133 - 144 mmol/L    Potassium 3.8 3.4 - 5.3 mmol/L    Chloride 107 94 - 109 mmol/L    Carbon Dioxide 23 20 - 32 mmol/L    Anion Gap 7 3 - 14 mmol/L    Glucose 97 70 - 99 mg/dL    Urea Nitrogen 13 7 - 30 mg/dL    Creatinine 0.69 0.66 - 1.25 mg/dL    GFR Estimate >90 >60 mL/min/[1.73_m2]    GFR Estimate If Black >90 >60 mL/min/[1.73_m2]    Calcium 9.4 8.5 - 10.1 mg/dL    Bilirubin Total 0.8 0.2 - 1.3 mg/dL    Albumin 4.2 3.4 - 5.0 g/dL    Protein Total 7.7 6.8 - 8.8 g/dL    Alkaline Phosphatase 48 40 - 150 U/L    ALT 30 0 - 70 U/L    AST 19 0 - 45 U/L   Lipase   Result Value Ref Range    Lipase 77 73 - 393 U/L   D dimer quantitative   Result Value Ref Range    D Dimer 0.5 0.0 - 0.50 ug/ml FEU   Hemoglobin A1c   Result Value Ref Range    Hemoglobin A1C 5.0 0 - 5.6 %   Chest XR,  PA & LAT    Narrative    CHEST TWO VIEWS April 22, 2020 1:41 PM     HISTORY: Chest pain.    COMPARISON: None.       Impression    IMPRESSION: There are no acute infiltrates. The cardiac   silhouette is  not enlarged. Pulmonary vasculature is unremarkable.    DAVIN SALCEDO MD   Troponin I (now)   Result Value Ref Range    Troponin I ES <0.015 0.000 - 0.045 ug/L   Lipid panel reflex to direct LDL   Result Value Ref Range    Cholesterol 231 (H) <200 mg/dL    Triglycerides 186 (H) <150 mg/dL     HDL Cholesterol 36 (L) >39 mg/dL    LDL Cholesterol Calculated 158 (H) <100 mg/dL    Non HDL Cholesterol 195 (H) <130 mg/dL   TSH with free T4 reflex   Result Value Ref Range    TSH 3.29 0.40 - 4.00 mU/L   CBC with platelets   Result Value Ref Range    WBC 8.1 4.0 - 11.0 10e9/L    RBC Count 5.01 4.4 - 5.9 10e12/L    Hemoglobin 15.8 13.3 - 17.7 g/dL    Hematocrit 44.4 40.0 - 53.0 %    MCV 89 78 - 100 fl    MCH 31.5 26.5 - 33.0 pg    MCHC 35.6 31.5 - 36.5 g/dL    RDW 12.7 10.0 - 15.0 %    Platelet Count 245 150 - 450 10e9/L   Troponin I - Now then in 6 hours x 3   Result Value Ref Range    Troponin I ES <0.015 0.000 - 0.045 ug/L   Troponin I - Now then in 6 hours x 3   Result Value Ref Range    Troponin I ES <0.015 0.000 - 0.045 ug/L   Heparin 10a Level   Result Value Ref Range    Heparin 10A Level 0.24 IU/mL   Heparin Xa (10a) Level   Result Value Ref Range    Heparin 10A Level 0.26 IU/mL            Lipid panel reflex to direct LDL   Result Value Ref Range    Cholesterol 231 (H) <200 mg/dL    Triglycerides 186 (H) <150 mg/dL    HDL Cholesterol 36 (L) >39 mg/dL    LDL Cholesterol Calculated 158 (H) <100 mg/dL    Non HDL Cholesterol 195 (H) <130 mg/dL   TSH with free T4 reflex   Result Value Ref Range    TSH 3.29 0.40 - 4.00 mU/L   CBC with platelets   Result Value Ref Range    WBC 8.1 4.0 - 11.0 10e9/L    RBC Count 5.01 4.4 - 5.9 10e12/L    Hemoglobin 15.8 13.3 - 17.7 g/dL    Hematocrit 44.4 40.0 - 53.0 %    MCV 89 78 - 100 fl    MCH 31.5 26.5 - 33.0 pg    MCHC 35.6 31.5 - 36.5 g/dL    RDW 12.7 10.0 - 15.0 %    Platelet Count 245 150 - 450 10e9/L   Troponin I - Now then in 6 hours x 3   Result Value Ref Range    Troponin I ES <0.015 0.000 - 0.045 ug/L   Troponin I - Now then in 6 hours x 3   Result Value Ref Range    Troponin I ES <0.015 0.000 - 0.045 ug/L   Heparin 10a Level   Result Value Ref Range    Heparin 10A Level 0.24 IU/mL   Heparin Xa (10a) Level   Result Value Ref Range    Heparin 10A Level 0.26 IU/mL    Echocardiogram Complete    Narrative    [id number]  [id number]  [id number]  [id number]^CARMINA^SACHIN^DANIEL           [facility]  Echocardiography Laboratory  [address] [place] [address]        Name: NATE OLEA  MRN: [id number]  : 1960  Study Date: 2020 08:21 AM  Age: 60 yrs  Gender: Male  Patient Location: [facility]  Reason For Study: Chest Pain, Chest Tightness, Chest Pressure  Ordering Physician: SACHIN GREWAL  Referring Physician: SACHIN GREWAL  Performed By: CAITLIN Best     BSA: 2.4 m2  Height: 74 in  Weight: 252 lb  HR: 75  BP: 137/85 mmHg  _____________________________________________________________________________  __        Procedure  Optison (NDC #8134-4761) given intravenously. Complete Portable Echo Adult.  _____________________________________________________________________________  __        Interpretation Summary     Technically difficult iimaging.  No regional wall motion abnormalities noted.  The pericardium appears normal.  The aortic root is normal size.  Hyperdynamic left ventricular function  The visual ejection fraction is estimated at >70%.  There is mild concentric left ventricular hypertrophy.  The right ventricle is normal in structure, function and size.  Doppler interrogation does not demonstrate signficant stenoisis or  insufficiency involving cardiac valves.     No old studies availble for comparison.  _____________________________________________________________________________  __        Left Ventricle  The left ventricle is normal in size. There is mild concentric left  ventricular hypertrophy. Hyperdynamic left ventricular function. The visual  ejection fraction is estimated at >70%. Grade I or early diastolic  dysfunction. No regional wall motion abnormalities noted. There is no thrombus  seen in the left ventricle.     Right Ventricle  The right ventricle is normal in structure, function and size. There is no  mass or thrombus in the right  ventricle.     Atria  Normal left atrial size. Right atrial size is normal. There is no atrial shunt  seen. The left atrial appendage is not well visualized.     Mitral Valve  The mitral valve leaflets appear normal. There is no evidence of stenosis,  fluttering, or prolapse. There is no mitral regurgitation noted. There is no  mitral valve stenosis.        Tricuspid Valve  Normal tricuspid valve. No tricuspid regurgitation. Right ventricular systolic  pressure could not be approximated due to inadequate tricuspid regurgitation.  There is no tricuspid stenosis.     Aortic Valve  There is mild trileaflet aortic sclerosis. No aortic regurgitation is present.  No aortic stenosis is present.     Pulmonic Valve  The pulmonic valve is not well seen, but is grossly normal. There is no  pulmonic valvular regurgitation. There is no pulmonic valvular stenosis.     Vessels  The aortic root is normal size. Inferior vena cava not well visualized for  estimation of right atrial pressure. The pulmonary artery is normal size.     Pericardium  The pericardium appears normal. There is no pleural effusion.        Rhythm  Sinus rhythm was noted.  _____________________________________________________________________________  __  MMode/2D Measurements & Calculations  IVSd: 1.3 cm     LVIDd: 3.5 cm  LVIDs: 2.0 cm  LVPWd: 1.4 cm  FS: 42.9 %  LV mass(C)d: 163.9 grams  LV mass(C)dI: 68.4 grams/m2  Ao root diam: 3.7 cm  LA dimension: 4.8 cm  asc Aorta Diam: 3.3 cm  LA/Ao: 1.3  LVOT diam: 2.4 cm  LVOT area: 4.5 cm2  LA Volume (BP): 60.0 ml  LA Volume Indexed (AL/bp): 26.4 ml/m2  RWT: 0.78           Doppler Measurements & Calculations  MV E max bethel: 66.5 cm/sec  MV A max bethel: 73.4 cm/sec  MV E/A: 0.91  MV dec time: 0.17 sec  PA acc time: 0.13 sec  E/E' av.2  Lateral E/e': 5.2  Medial E/e': 7.2           _____________________________________________________________________________  __           Report approved by: Dr. Tres Harris  2020 09:18 AM      Echo Stress Echocardiogram    Narrative    [id number]  [id number]  [id number]  [id number]^CARMINA^SACHIN^DANIEL           [facility]  Echocardiography Laboratory  [address] [place] [address]        Name: NATE OLEA  MRN: [id number]  : 1960  Study Date: 2020 08:33 AM  Age: 60 yrs  Gender: Male  Patient Location: [facility]  Reason For Study: Unstable Angina  Ordering Physician: SACHIN GREWAL  Referring Physician: Ivelisse PCP  Performed By: FARHANA Best     BSA: 2.3 m2  Height: 74 in  Weight: 240 lb  HR: 94  BP: 143/88 mmHg  _____________________________________________________________________________  __        Procedure  Contrast Optison. Stress Echo Complete.  _____________________________________________________________________________  __        Interpretation Summary  The patient exhibited no chest pain during exercise.  The Duke treadmill score was low risk ( >5 Evans score).  The study was technically difficult. This was a normal stress echocardiogram  with no evidence of stress-induced ischemia. This was a normal stress EKG with  no evidence of stress-induced ischemia.  _____________________________________________________________________________  __     Stress  The patient exercised 6:00 min.  A low to moderate workload was achieved.  RPP 37143.  Exercise was stopped due to fatigue.  The patient exhibited dyspnea during exercise.  There was a normal BP response to exercise.  The patient exhibited no chest pain during exercise.  Target Heart Rate was achieved.  A treadmill exercise test according to the Ramiro protocol was performed.  The Duke treadmill score was low risk ( >5 Duke score).  The EKG portion of this stress test was negative for inducible ischemia (see  echo results below).  No arrhythmia noted.  The visual ejection fraction is estimated at >70%.  Left ventricular cavity size decreases with exercise.  Global LV systolic function augments with  exercise.  Normal resting wall motion and no stress-induced wall motion abnormality.     Baseline  The patient is in normal sinus rhythm.  IVCD in AVL and V2.  The visual ejection fraction is estimated at 60-65%.  No regional wall motion abnormalities noted.     Stress Results         Protocol:  Ramiro Protocol        Maximum Predicted HR:   160 bpm         Target HR: 136 bpm               % Maximum Predicted HR: 100 %       Stage  DurationHeart Rate  BP                     Comment            (mm:ss)   (bpm)   Stage 1   3:00      137   158/60   Stage 2   3:00      160   180/60FAC; below average, Duke score: 6 (low risk)  RecoveryR  4:00      108   140/88SOB improved by 1 min. post                Stress Duration:   6:00 mm:ss        Recovery Time: 4:00 mm:ss          Maximum Stress HR: 160 bpm           METS:          7     Mitral Valve  There is trace mitral regurgitation.        Tricuspid Valve  There is trace tricuspid regurgitation.     Aortic Valve  The aortic valve is not well visualized. No aortic regurgitation is present.  No hemodynamically significant valvular aortic stenosis.  _____________________________________________________________________________  __                                Report approved by: Darrell Doherty 04/23/2020 09:57 AM                    _____________________________________________________________________________  __

## 2020-04-23 NOTE — PLAN OF CARE
A/O, VSS, O2sats 99% on RA. Tele SR c 1st degree AVB. Denies pain, SOB or dizziness. Indp off bedrest. R radial site WDL, CMS intact, TR band in place, 9 ml remaining. Plan for early discharge tomorrow.

## 2020-04-23 NOTE — PLAN OF CARE
OT/CR: pt admitted with unstable angina, plans for cards consult and stress test this date. Hold CR eval until POC established. Per notes, pt currently up IND.

## 2020-04-23 NOTE — PLAN OF CARE
Service: Medical  Behavior/Aggression tool color: Green  Diagnosis: Unstable angina  POD#: N/a  Mental Status: AxO x4  Activity/dangle: Independent in the room  Diet: NPO at midnight for stress test today  Pain: Denies pain, no chest pain  Storey/Voiding: Bathroom  Tele/Restraints/Iso: Tele NSR / occasionally SB  02/LDA: L PIV infusing  D/C Date: Pending plan and stress test    Shift note: VSS, RA. Denies chest pain. Up ad jarvis. NPO at midnight. Plan for stress test today. Heparin gtt at 11 mL/hr. Normal saline at 75 ml/hr. Resting comfortably between cares.

## 2020-04-23 NOTE — CONSULTS
[facility]    Gastroenterology Consultation    Date of Admission:  4/22/2020    History of Present Illness   Amado Veliz is a 60 year old male who presents with chest pain, GI consulted for dysphagia. PMH of HLD and 15 pack year hx here due to chest pain originally exertional and now even at rest. During the hospital stay, he also endorses to have dysphagia.    Dysphagia is to solid but not liquid, it has been there for more than 10 years which he describes as he can feel food going down slowly and with the tendency that it may get stuck and need to use extra water to flush it down. However the sx has been getting worse and worse lately which he intends to find a gastroenterologist but didn't get a chance to do it. He dose modify his habit of chewing food thoroughly and no longer eating bulky object such as steak.    He denies any atopic disease besides allergy to grass when he was younger, denies reflux sx.    Assessment & Plan   Amado Veliz is a 60 year old male who was admitted on 4/22/2020. I was asked to see the patient for dysphagia.  -to solid but not liquid hence most likely structural issue rather than peristalsis  -given pt has sx for a long time, will have pt finished cardiac work up first  -pt wishes to do work up as outpatient hence please have pt follow up with outpatient EGD  -can continue with regular food, pt is mindful about chewing food thoroughly    CPT: 00808    Active Problems:    Unstable angina (H)    Pharyngoesophageal dysphagia      Ghada Garcia MD  (Daniel)  [facility]  Office: [phone]  Cell: [phone], please feel free to call the cell    Code Status    Full Code      Primary Care Physician   Physician No Ref-Primary      Ghada Garcia MD  (Daniel)  [facility]  Office: [phone]  Cell: [phone], please feel free to call the cell      Past Medical History   I have reviewed this patient's  medical history and updated it with pertinent information if needed.   Past Medical History:   Diagnosis Date     Intestinal infection due to Clostridium difficile      Mixed hyperlipidemia        Past Surgical History   I have reviewed this patient's surgical history and updated it with pertinent information if needed.  Past Surgical History:   Procedure Laterality Date     BACK SURGERY      L5-S1 disk herniation      NO HISTORY OF SURGERY         Prior to Admission Medications   Prior to Admission Medications   Prescriptions Last Dose Informant Patient Reported? Taking?   multivitamin w/minerals (THERA-VIT-M) tablet 4/22/2020 at am  Yes Yes   Sig: Take 1 tablet by mouth daily      Facility-Administered Medications: None     Allergies   Allergies   Allergen Reactions     Niacin      flushing       Social History   I have reviewed this patient's social history and updated it with pertinent information if needed. Amado Veliz  reports that he quit smoking about 28 years ago. He quit after 13.00 years of use. He has never used smokeless tobacco. He reports that he does not drink alcohol or use drugs.    Family History   I have reviewed this patient's family history and updated it with pertinent information if needed.   Family History   Problem Relation Age of Onset     Family History Negative Father      Deep Vein Thrombosis Father      Atrial fibrillation Father      Family History Negative Mother      Family History Negative Brother      Deep Vein Thrombosis Sister      Ovarian Cancer Maternal Grandmother      Coronary Artery Disease Maternal Grandfather        Review of Systems   The 10 point Review of Systems is negative other than noted in the HPI or here.     Physical Exam   Temp: 97.6  F (36.4  C) Temp src: Oral BP: 137/85 Pulse: 79 Heart Rate: 80 Resp: 16 SpO2: 97 % O2 Device: None (Room air)    Vital Signs with Ranges  Temp:  [97.5  F (36.4  C)-98.7  F (37.1  C)] 97.6  F (36.4  C)  Pulse:  [65-79]  79  Heart Rate:  [62-80] 80  Resp:  [10-18] 16  BP: (137-172)/(69-99) 137/85  SpO2:  [95 %-99 %] 97 %  252 lbs 12.8 oz    Exam:  Constitutional: healthy, alert and no distress  Head: Normocephalic. No masses, lesions, tenderness or abnormalities  Neck: Neck supple. No adenopathy. Thyroid symmetric, normal size,, Carotids without bruits.  ENT: ENT exam normal, no neck nodes or sinus tenderness  Cardiovascular: negative, PMI normal. No lifts, heaves, or thrills. RRR. No murmurs, clicks gallops or rub  Respiratory: negative, Percussion normal. Good diaphragmatic excursion. Lungs clear  Gastrointestinal: Abdomen soft, non-tender. BS normal. No masses, organomegaly  : Deferred  Musculoskeletal: extremities normal- no gross deformities noted, gait normal and normal muscle tone  Skin: no suspicious lesions or rashes  Neurologic: Gait normal. Reflexes normal and symmetric. Sensation grossly WNL.  Psychiatric: mentation appears normal and affect normal/bright  Hematologic/Lymphatic/Immunologic: Normal cervical lymph nodes     Data   Results for orders placed or performed during the hospital encounter of 04/22/20 (from the past 24 hour(s))   EKG 12 lead   Result Value Ref Range    Interpretation ECG Click View Image link to view waveform and result    CBC with platelets differential   Result Value Ref Range    WBC 6.5 4.0 - 11.0 10e9/L    RBC Count 5.03 4.4 - 5.9 10e12/L    Hemoglobin 15.9 13.3 - 17.7 g/dL    Hematocrit 44.1 40.0 - 53.0 %    MCV 88 78 - 100 fl    MCH 31.6 26.5 - 33.0 pg    MCHC 36.1 31.5 - 36.5 g/dL    RDW 12.6 10.0 - 15.0 %    Platelet Count 241 150 - 450 10e9/L    Diff Method Automated Method     % Neutrophils 73.2 %    % Lymphocytes 20.4 %    % Monocytes 5.3 %    % Eosinophils 0.6 %    % Basophils 0.3 %    % Immature Granulocytes 0.2 %    Nucleated RBCs 0 0 /100    Absolute Neutrophil 4.7 1.6 - 8.3 10e9/L    Absolute Lymphocytes 1.3 0.8 - 5.3 10e9/L    Absolute Monocytes 0.3 0.0 - 1.3 10e9/L    Absolute  Eosinophils 0.0 0.0 - 0.7 10e9/L    Absolute Basophils 0.0 0.0 - 0.2 10e9/L    Abs Immature Granulocytes 0.0 0 - 0.4 10e9/L    Absolute Nucleated RBC 0.0    Troponin I   Result Value Ref Range    Troponin I ES <0.015 0.000 - 0.045 ug/L   Comprehensive metabolic panel   Result Value Ref Range    Sodium 137 133 - 144 mmol/L    Potassium 3.8 3.4 - 5.3 mmol/L    Chloride 107 94 - 109 mmol/L    Carbon Dioxide 23 20 - 32 mmol/L    Anion Gap 7 3 - 14 mmol/L    Glucose 97 70 - 99 mg/dL    Urea Nitrogen 13 7 - 30 mg/dL    Creatinine 0.69 0.66 - 1.25 mg/dL    GFR Estimate >90 >60 mL/min/[1.73_m2]    GFR Estimate If Black >90 >60 mL/min/[1.73_m2]    Calcium 9.4 8.5 - 10.1 mg/dL    Bilirubin Total 0.8 0.2 - 1.3 mg/dL    Albumin 4.2 3.4 - 5.0 g/dL    Protein Total 7.7 6.8 - 8.8 g/dL    Alkaline Phosphatase 48 40 - 150 U/L    ALT 30 0 - 70 U/L    AST 19 0 - 45 U/L   Lipase   Result Value Ref Range    Lipase 77 73 - 393 U/L   D dimer quantitative   Result Value Ref Range    D Dimer 0.5 0.0 - 0.50 ug/ml FEU   Hemoglobin A1c   Result Value Ref Range    Hemoglobin A1C 5.0 0 - 5.6 %   Chest XR,  PA & LAT    Narrative    CHEST TWO VIEWS April 22, 2020 1:41 PM     HISTORY: Chest pain.    COMPARISON: None.       Impression    IMPRESSION: There are no acute infiltrates. The cardiac silhouette is  not enlarged. Pulmonary vasculature is unremarkable.    DAVIN SALCEDO MD   Troponin I (now)   Result Value Ref Range    Troponin I ES <0.015 0.000 - 0.045 ug/L   Lipid panel reflex to direct LDL   Result Value Ref Range    Cholesterol 231 (H) <200 mg/dL    Triglycerides 186 (H) <150 mg/dL    HDL Cholesterol 36 (L) >39 mg/dL    LDL Cholesterol Calculated 158 (H) <100 mg/dL    Non HDL Cholesterol 195 (H) <130 mg/dL   TSH with free T4 reflex   Result Value Ref Range    TSH 3.29 0.40 - 4.00 mU/L   CBC with platelets   Result Value Ref Range    WBC 8.1 4.0 - 11.0 10e9/L    RBC Count 5.01 4.4 - 5.9 10e12/L    Hemoglobin 15.8 13.3 - 17.7 g/dL     Hematocrit 44.4 40.0 - 53.0 %    MCV 89 78 - 100 fl    MCH 31.5 26.5 - 33.0 pg    MCHC 35.6 31.5 - 36.5 g/dL    RDW 12.7 10.0 - 15.0 %    Platelet Count 245 150 - 450 10e9/L   Troponin I - Now then in 6 hours x 3   Result Value Ref Range    Troponin I ES <0.015 0.000 - 0.045 ug/L   Troponin I - Now then in 6 hours x 3   Result Value Ref Range    Troponin I ES <0.015 0.000 - 0.045 ug/L   Heparin 10a Level   Result Value Ref Range    Heparin 10A Level 0.24 IU/mL   Heparin Xa (10a) Level   Result Value Ref Range    Heparin 10A Level 0.26 IU/mL

## 2020-04-23 NOTE — PRE-PROCEDURE
GENERAL PRE-PROCEDURE:     Written consent obtained?: Yes    Risks and benefits: Risks, benefits and alternatives were discussed    Consent given by:  Patient  Patient states understanding of procedure being performed: Yes    Patient's understanding of procedure matches consent: Yes    Procedure consent matches procedure scheduled: Yes    Expected level of sedation:  Moderate  Appropriately NPO:  Yes  ASA Class:  Class 3- Severe systemic disease, definite functional limitations  Mallampati  :  Grade 2- soft palate, base of uvula, tonsillar pillars, and portion of posterior pharyngeal wall visible  Lungs:  Lungs clear with good breath sounds bilaterally  Heart:  Normal heart sounds and rate  History & Physical reviewed:  History and physical reviewed and no updates needed  Statement of review:  I have reviewed the lab findings, diagnostic data, medications, and the plan for sedation

## 2020-04-23 NOTE — PROGRESS NOTES
Patient is A&O x4. Up independent. Denies chest pain or SOB this shift. Had stress test this AM. NPO since 1030. Voiding per pt's report. Left for cardiac catheterization around 1530. [facility]. Report given to the assigned RN. Belonging transferred to the assigned room.

## 2020-04-23 NOTE — PROVIDER NOTIFICATION
Paged Dr. Wayne 04/23/20 5:48 PM per hospitalist request regarding possible discharge this evening. Orders received: ok to discharge if stable and antiplatelet meds set up; discussed with patient, agreeable to stay overnight and early discharge tomorrow. Dr. Gupta updated.

## 2020-04-23 NOTE — DISCHARGE SUMMARY
Discharge Summary  Hospitalist    Date of Admission:  4/22/2020  Date of Discharge:  4/24/2020  Discharging Provider: Brandie Gupta MD  Date of Service (when I saw the patient): 04/24/20    Discharge Diagnoses   Chest pain due to CAD, unstable angina  Hyperlipidemia  Dysphagia  Obesity      History of Present Illness   Please refer H & P for details.      Hospital Course   Patient was admitted with concern for unstable angina.  Risk factors include age, hyperlipidemia, obesity, family history.  Character of pain also sounded typical for angina.  He was treated with aspirin, placed on heparin drip overnight.  Chest x-ray showed no acute infiltrate or congestive changes.  D-dimer was negative.  He ruled out for ACS with serial negative troponins and EKGs.  Heparin was discontinued in the morning.  He underwent stress echocardiogram in the morning that was negative for ischemia.  Cardiology was consulted.  Given that he was higher risk for CAD, he underwent cardiac catheterization.  This showed :  1.  Moderate proximal RCA stenosis by angiography, but with clear ruptured plaque on OCT.  2.  Successful PCI of the proximal RCA ruptured plaque with a 4.0 x 16 mm Synergy LUZ, postdilated to 4.7.  3.  Mild nonobstructive CAD elsewhere.     -Started on DAPT, recommended for 6 months.   -Toprol XL 50 mg daily started  -Referral to Cardiac Rehab.     For his complaints of dysphagia, patient was seen by GI.  Recommendation was made to pursue outpatient EGD.  No need for urgent intervention at this time.       He was started on atorvastatin for hyperlipidemia.             Brandie Gupta MD, MD      Pending Results   These results will be followed up by Hospitalist team.  Unresulted Labs Ordered in the Past 30 Days of this Admission     No orders found from 3/23/2020 to 4/23/2020.          Code Status   Full Code       Primary Care Physician   Physician No Ref-Primary    Follow-ups Needed After Discharge       Physical Exam    Temp: 96.7  F (35.9  C) Temp src: Axillary BP: 110/68 Pulse: 63 Heart Rate: 75 Resp: 15 SpO2: 100 % O2 Device: None (Room air) Oxygen Delivery: 2 LPM  Vitals:    04/22/20 1141 04/23/20 0738 04/24/20 0631   Weight: 108.9 kg (240 lb) 114.7 kg (252 lb 12.8 oz) 111.6 kg (246 lb)     Vital Signs with Ranges  Temp:  [96.7  F (35.9  C)-98.1  F (36.7  C)] 96.7  F (35.9  C)  Pulse:  [61-87] 63  Heart Rate:  [75-89] 75  Resp:  [15-16] 15  BP: (102-137)/() 110/68  SpO2:  [95 %-100 %] 100 %  I/O last 3 completed shifts:  In: 416.25 [P.O.:240; I.V.:176.25]  Out: -     Constitutional: Alert, cooperative, no apparent distress  Respiratory: Non labored breathing, clear to auscultation bilaterally, no crackles or wheezing  Cardiovascular: Regular rate and rhythm, no murmurs, no edema  GI: Normal bowel sounds, soft, non-distended, non-tender  Skin: No obvious rash  Neuro: Alert, engages in appropriate conversation, fluent speech, moving all extremities, no facial asymmetry  Psych: Calm and pleasant, no obvious anxiety/ depression      Discharge Disposition   Discharged to home  Condition at discharge: Stable    Consultations This Hospital Stay   CARDIAC REHAB IP CONSULT  PHARMACY TO DOSE HEPARIN  GASTROENTEROLOGY IP CONSULT  CARDIOLOGY IP CONSULT  NUTRITION SERVICES ADULT IP CONSULT  CARDIAC REHAB IP CONSULT  PHARMACY IP CONSULT  PHARMACY IP CONSULT  SMOKING CESSATION PROGRAM IP CONSULT  SMOKING CESSATION PROGRAM IP CONSULT    Time Spent on this Encounter   Brandie EAST MD, personally saw the patient today and spent greater than 30 minutes discharging this patient.    Discharge Orders      CARDIAC REHAB REFERRAL      Reason for your hospital stay    You were hospitalized with chest pain and had a cardiac stent placed.     Follow-up and recommended labs and tests     Follow up with primary care provider, Physician No Ref-Primary, within 7 days for hospital follow- up.  No follow up labs or test are needed.  Follow up  with GI clinic in 2 weeks for outpatient EGD  Follow up in Cardiology clinic in 2 weeks.     Activity    Your activity upon discharge: activity as tolerated     When to contact your care team    Call your primary doctor if you have any of the following: worsening shortness of breath, chest pain, cough, fever, bleeding issues     Full Code     Diet    Follow this diet upon discharge: Orders Placed This Encounter      Regular Diet Adult     Discharge Medications   Current Discharge Medication List      START taking these medications    Details   aspirin (ASA) 81 MG EC tablet Take 1 tablet (81 mg) by mouth daily  Qty: 100 tablet, Refills: 0    Associated Diagnoses: Unstable angina (H)      atorvastatin (LIPITOR) 40 MG tablet Take 1 tablet (40 mg) by mouth every evening  Qty: 30 tablet, Refills: 0    Associated Diagnoses: Unstable angina (H)      metoprolol succinate ER (TOPROL-XL) 50 MG 24 hr tablet Take 1 tablet (50 mg) by mouth daily  Qty: 30 tablet, Refills: 0    Associated Diagnoses: Unstable angina (H)      ticagrelor (BRILINTA) 90 MG tablet Take 1 tablet (90 mg) by mouth every 12 hours  Qty: 30 tablet, Refills: 0    Associated Diagnoses: Unstable angina (H)         CONTINUE these medications which have NOT CHANGED    Details   multivitamin w/minerals (THERA-VIT-M) tablet Take 1 tablet by mouth daily           Allergies   Allergies   Allergen Reactions     Niacin      flushing     Data   Most Recent 3 CBC's:  Recent Labs   Lab Test 04/24/20  0607 04/22/20  1656 04/22/20  1222   WBC 6.4 8.1 6.5   HGB 14.1 15.8 15.9   MCV 89 89 88    245 241      Most Recent 3 BMP's:  Recent Labs   Lab Test 04/24/20  0607 04/22/20  1222    137   POTASSIUM 4.6 3.8   CHLORIDE 109 107   CO2 29 23   BUN 10 13   CR 0.80 0.69   ANIONGAP 2* 7   DANIELITO 8.5 9.4   * 97     Most Recent 2 LFT's:  Recent Labs   Lab Test 04/22/20  1222   AST 19   ALT 30   ALKPHOS 48   BILITOTAL 0.8     Most Recent INR's and Anticoagulation Dosing  History:  Anticoagulation Dose History     There is no flowsheet data to display.        Most Recent 3 Troponin's:  Recent Labs   Lab Test 20  0104 20  2041 20  1420   TROPI <0.015 <0.015 <0.015     Most Recent Cholesterol Panel:  Recent Labs   Lab Test 20  1656   CHOL 231*   *   HDL 36*   TRIG 186*     Most Recent 6 Bacteria Isolates From Any Culture (See EPIC Reports for Culture Details):No lab results found.  Most Recent TSH, T4 and A1c Labs:  Recent Labs   Lab Test 20  1656 20  1222   TSH 3.29  --    A1C  --  5.0       Results for orders placed or performed during the hospital encounter of 20   Chest XR,  PA & LAT    Narrative    CHEST TWO VIEWS 2020 1:41 PM     HISTORY: Chest pain.    COMPARISON: None.       Impression    IMPRESSION: There are no acute infiltrates. The cardiac silhouette is  not enlarged. Pulmonary vasculature is unremarkable.    DAVIN SALCEDO MD   Echocardiogram Complete    Narrative    [id number]  [id number]  [id number]^CARMINA^SACHIN^DANIEL           [facility]  Echocardiography Laboratory  [address] [place] [address]        Name: NATE OLEA  MRN: [id number]  : 1960  Study Date: 2020 08:21 AM  Age: 60 yrs  Gender: Male  Patient Location: [facility]  Reason For Study: Chest Pain, Chest Tightness, Chest Pressure  Ordering Physician: SACHIN GREWAL  Referring Physician: SACHIN GREWAL  Performed By: FARHANA Best     BSA: 2.4 m2  Height: 74 in  Weight: 252 lb  HR: 75  BP: 137/85 mmHg  _____________________________________________________________________________  __        Procedure  Optison (NDC #[id number]) given intravenously. Complete Portable Echo Adult.  _____________________________________________________________________________  __        Interpretation Summary     Technically difficult iimaging.  No regional wall motion abnormalities noted.  The pericardium appears normal.  The aortic  root is normal size.  Hyperdynamic left ventricular function  The visual ejection fraction is estimated at >70%.  There is mild concentric left ventricular hypertrophy.  The right ventricle is normal in structure, function and size.  Doppler interrogation does not demonstrate signficant stenoisis or  insufficiency involving cardiac valves.     No old studies availble for comparison.  _____________________________________________________________________________  __        Left Ventricle  The left ventricle is normal in size. There is mild concentric left  ventricular hypertrophy. Hyperdynamic left ventricular function. The visual  ejection fraction is estimated at >70%. Grade I or early diastolic  dysfunction. No regional wall motion abnormalities noted. There is no thrombus  seen in the left ventricle.     Right Ventricle  The right ventricle is normal in structure, function and size. There is no  mass or thrombus in the right ventricle.     Atria  Normal left atrial size. Right atrial size is normal. There is no atrial shunt  seen. The left atrial appendage is not well visualized.     Mitral Valve  The mitral valve leaflets appear normal. There is no evidence of stenosis,  fluttering, or prolapse. There is no mitral regurgitation noted. There is no  mitral valve stenosis.        Tricuspid Valve  Normal tricuspid valve. No tricuspid regurgitation. Right ventricular systolic  pressure could not be approximated due to inadequate tricuspid regurgitation.  There is no tricuspid stenosis.     Aortic Valve  There is mild trileaflet aortic sclerosis. No aortic regurgitation is present.  No aortic stenosis is present.     Pulmonic Valve  The pulmonic valve is not well seen, but is grossly normal. There is no  pulmonic valvular regurgitation. There is no pulmonic valvular stenosis.     Vessels  The aortic root is normal size. Inferior vena cava not well visualized for  estimation of right atrial pressure. The pulmonary  artery is normal size.     Pericardium  The pericardium appears normal. There is no pleural effusion.        Rhythm  Sinus rhythm was noted.  _____________________________________________________________________________  __  MMode/2D Measurements & Calculations  IVSd: 1.3 cm     LVIDd: 3.5 cm  LVIDs: 2.0 cm  LVPWd: 1.4 cm  FS: 42.9 %  LV mass(C)d: 163.9 grams  LV mass(C)dI: 68.4 grams/m2  Ao root diam: 3.7 cm  LA dimension: 4.8 cm  asc Aorta Diam: 3.3 cm  LA/Ao: 1.3  LVOT diam: 2.4 cm  LVOT area: 4.5 cm2  LA Volume (BP): 60.0 ml  LA Volume Indexed (AL/bp): 26.4 ml/m2  RWT: 0.78           Doppler Measurements & Calculations  MV E max bethel: 66.5 cm/sec  MV A max bethel: 73.4 cm/sec  MV E/A: 0.91  MV dec time: 0.17 sec  PA acc time: 0.13 sec  E/E' av.2  Lateral E/e': 5.2  Medial E/e': 7.2           _____________________________________________________________________________  __           Report approved by: Dr. Tres Harris 2020 09:18 AM      Echo Stress Echocardiogram    Narrative    [id number]  [id number]  [id number]^CARMINA^SACHIN^DANIEL           [facility]  Echocardiography Laboratory  [address] [place] [address]        Name: NATE OLEA  MRN: [id number]  : 1960  Study Date: 2020 08:33 AM  Age: 60 yrs  Gender: Male  Patient Location: [facility]  Reason For Study: Unstable Angina  Ordering Physician: SACHIN GREWAL  Referring Physician: Ivelisse PCP  Performed By: FARHANA Best     BSA: 2.3 m2  Height: 74 in  Weight: 240 lb  HR: 94  BP: 143/88 mmHg  _____________________________________________________________________________  __        Procedure  Contrast Optison. Stress Echo Complete.  _____________________________________________________________________________  __        Interpretation Summary  The patient exhibited no chest pain during exercise.  The Duke treadmill score was low risk ( >5 Evans score).  The study was technically difficult. This was a normal  stress echocardiogram  with no evidence of stress-induced ischemia. This was a normal stress EKG with  no evidence of stress-induced ischemia.  _____________________________________________________________________________  __     Stress  The patient exercised 6:00 min.  A low to moderate workload was achieved.  RPP 56257.  Exercise was stopped due to fatigue.  The patient exhibited dyspnea during exercise.  There was a normal BP response to exercise.  The patient exhibited no chest pain during exercise.  Target Heart Rate was achieved.  A treadmill exercise test according to the Ramiro protocol was performed.  The Duke treadmill score was low risk ( >5 Duke score).  The EKG portion of this stress test was negative for inducible ischemia (see  echo results below).  No arrhythmia noted.  The visual ejection fraction is estimated at >70%.  Left ventricular cavity size decreases with exercise.  Global LV systolic function augments with exercise.  Normal resting wall motion and no stress-induced wall motion abnormality.     Baseline  The patient is in normal sinus rhythm.  IVCD in AVL and V2.  The visual ejection fraction is estimated at 60-65%.  No regional wall motion abnormalities noted.     Stress Results         Protocol:  Ramiro Protocol        Maximum Predicted HR:   160 bpm         Target HR: 136 bpm               % Maximum Predicted HR: 100 %       Stage  DurationHeart Rate  BP                     Comment            (mm:ss)   (bpm)   Stage 1   3:00      137   158/60   Stage 2   3:00      160   180/60FAC; below average, Duke score: 6 (low risk)  RecoveryR  4:00      108   140/88SOB improved by 1 min. post                Stress Duration:   6:00 mm:ss        Recovery Time: 4:00 mm:ss          Maximum Stress HR: 160 bpm           METS:          7     Mitral Valve  There is trace mitral regurgitation.        Tricuspid Valve  There is trace tricuspid regurgitation.     Aortic Valve  The aortic valve is not well  visualized. No aortic regurgitation is present.  No hemodynamically significant valvular aortic stenosis.  _____________________________________________________________________________  __                                Report approved by: Darrell Doherty 04/23/2020 09:57 AM                    _____________________________________________________________________________  __

## 2020-04-24 ENCOUNTER — APPOINTMENT (OUTPATIENT)
Dept: OCCUPATIONAL THERAPY | Facility: CLINIC | Age: 60
End: 2020-04-24
Attending: INTERNAL MEDICINE
Payer: COMMERCIAL

## 2020-04-24 VITALS
HEIGHT: 74 IN | DIASTOLIC BLOOD PRESSURE: 81 MMHG | SYSTOLIC BLOOD PRESSURE: 130 MMHG | OXYGEN SATURATION: 97 % | BODY MASS INDEX: 31.57 KG/M2 | TEMPERATURE: 98.5 F | HEART RATE: 63 BPM | RESPIRATION RATE: 16 BRPM | WEIGHT: 246 LBS

## 2020-04-24 LAB
ANION GAP SERPL CALCULATED.3IONS-SCNC: 2 MMOL/L (ref 3–14)
BUN SERPL-MCNC: 10 MG/DL (ref 7–30)
CALCIUM SERPL-MCNC: 8.5 MG/DL (ref 8.5–10.1)
CHLORIDE SERPL-SCNC: 109 MMOL/L (ref 94–109)
CO2 SERPL-SCNC: 29 MMOL/L (ref 20–32)
CREAT SERPL-MCNC: 0.8 MG/DL (ref 0.66–1.25)
ERYTHROCYTE [DISTWIDTH] IN BLOOD BY AUTOMATED COUNT: 12.7 % (ref 10–15)
GFR SERPL CREATININE-BSD FRML MDRD: >90 ML/MIN/{1.73_M2}
GLUCOSE SERPL-MCNC: 100 MG/DL (ref 70–99)
HCT VFR BLD AUTO: 40.7 % (ref 40–53)
HGB BLD-MCNC: 14.1 G/DL (ref 13.3–17.7)
MCH RBC QN AUTO: 30.7 PG (ref 26.5–33)
MCHC RBC AUTO-ENTMCNC: 34.6 G/DL (ref 31.5–36.5)
MCV RBC AUTO: 89 FL (ref 78–100)
PLATELET # BLD AUTO: 203 10E9/L (ref 150–450)
POTASSIUM SERPL-SCNC: 4.6 MMOL/L (ref 3.4–5.3)
RBC # BLD AUTO: 4.59 10E12/L (ref 4.4–5.9)
SODIUM SERPL-SCNC: 140 MMOL/L (ref 133–144)
WBC # BLD AUTO: 6.4 10E9/L (ref 4–11)

## 2020-04-24 PROCEDURE — 80048 BASIC METABOLIC PNL TOTAL CA: CPT | Performed by: INTERNAL MEDICINE

## 2020-04-24 PROCEDURE — 36415 COLL VENOUS BLD VENIPUNCTURE: CPT | Performed by: INTERNAL MEDICINE

## 2020-04-24 PROCEDURE — 25000132 ZZH RX MED GY IP 250 OP 250 PS 637: Performed by: INTERNAL MEDICINE

## 2020-04-24 PROCEDURE — 99231 SBSQ HOSP IP/OBS SF/LOW 25: CPT | Performed by: INTERNAL MEDICINE

## 2020-04-24 PROCEDURE — 85027 COMPLETE CBC AUTOMATED: CPT | Performed by: INTERNAL MEDICINE

## 2020-04-24 PROCEDURE — 97110 THERAPEUTIC EXERCISES: CPT | Mod: GO | Performed by: OCCUPATIONAL THERAPIST

## 2020-04-24 PROCEDURE — 97535 SELF CARE MNGMENT TRAINING: CPT | Mod: GO | Performed by: OCCUPATIONAL THERAPIST

## 2020-04-24 PROCEDURE — 97165 OT EVAL LOW COMPLEX 30 MIN: CPT | Mod: GO | Performed by: OCCUPATIONAL THERAPIST

## 2020-04-24 PROCEDURE — 99207 ZZC CDG-CODE INCORRECT PER BILLING BASED ON TIME: CPT | Performed by: HOSPITALIST

## 2020-04-24 PROCEDURE — 99239 HOSP IP/OBS DSCHRG MGMT >30: CPT | Performed by: HOSPITALIST

## 2020-04-24 RX ORDER — METOPROLOL SUCCINATE 50 MG/1
50 TABLET, EXTENDED RELEASE ORAL DAILY
Qty: 30 TABLET | Refills: 0 | Status: SHIPPED | OUTPATIENT
Start: 2020-04-24 | End: 2020-04-28

## 2020-04-24 RX ORDER — ATORVASTATIN CALCIUM 40 MG/1
40 TABLET, FILM COATED ORAL EVERY EVENING
Qty: 30 TABLET | Refills: 0 | Status: SHIPPED | OUTPATIENT
Start: 2020-04-24 | End: 2020-04-28

## 2020-04-24 RX ADMIN — ASPIRIN 81 MG: 81 TABLET, DELAYED RELEASE ORAL at 09:54

## 2020-04-24 RX ADMIN — PANTOPRAZOLE SODIUM 40 MG: 40 TABLET, DELAYED RELEASE ORAL at 06:30

## 2020-04-24 RX ADMIN — METOPROLOL SUCCINATE 50 MG: 50 TABLET, EXTENDED RELEASE ORAL at 09:54

## 2020-04-24 RX ADMIN — ACETAMINOPHEN 650 MG: 325 TABLET, FILM COATED ORAL at 06:30

## 2020-04-24 RX ADMIN — TICAGRELOR 90 MG: 90 TABLET ORAL at 06:30

## 2020-04-24 ASSESSMENT — ACTIVITIES OF DAILY LIVING (ADL)
ADLS_ACUITY_SCORE: 10
PREVIOUS_RESPONSIBILITIES: MEAL PREP;HOUSEKEEPING;LAUNDRY;SHOPPING;YARDWORK;MEDICATION MANAGEMENT;FINANCES;DRIVING;WORK
ADLS_ACUITY_SCORE: 10

## 2020-04-24 ASSESSMENT — MIFFLIN-ST. JEOR: SCORE: 1995.6

## 2020-04-24 NOTE — CONSULTS
Briefly visited with patient via phone (due to COVID-19 pandemic).  He is ready to discharge home but is interested in receiving low fat, low sodium diet education in the mail for later review.    He did not have any questions at the time of my visit and indicated that he and his wife are familiar with low fat, low sodium diet guidelines in general.    Information mailed out per patient request.     Cecilia Robertson RD, LD, [facility]   Clinical Dietitian - [facility]

## 2020-04-24 NOTE — DISCHARGE INSTRUCTIONS
Cardiac Angiogram Discharge Instructions - Radial    After you go home:      Have an adult stay with you until tomorrow.    Drink extra fluids for 2 days.    You may resume your normal diet.    No smoking       For 24 hours - due to the sedation you received:    Relax and take it easy.    Do NOT make any important or legal decisions.    Do NOT drive or operate machines at home or at work.    Do NOT drink alcohol.    Care of Wrist Puncture Site:      For the first 24 hrs - check the puncture site every 1-2 hours while awake.    It is normal to have soreness at the puncture site and mild tingling in your hand for up to 3 days.    Remove the bandaid after 24 hours. If there is minor oozing, apply another bandaid and remove it after 12 hours.    You may shower tomorrow.  Do NOT take a bath, or use a hot tub or pool for at least 3 days. Do NOT scrub the site. Do not use lotion or powder near the puncture site.           Activity:        For 2 days:     do not use your hand or arm to support your weight (such as rising from a chair)     do not bend your wrist (such as lifting a garage door).    do not lift more than 5 pounds or exercise your arm (such as tennis, golf or bowling).    Do NOT do any heavy activity such as exercise, lifting, or straining.     Bleeding:      If you start bleeding from the site in your wrist, sit down and press firmly on/above the site for 10 minutes.     Once bleeding stops, keep arm still for 2 hours.     Call [facility] as soon as you can.       Call 911 right away if you have heavy bleeding or bleeding that does not stop.      Medicines:      If you are taking an antiplatelet medication such as Plavix, Brilinta or Effient, do not stop taking it until you talk to your cardiologist.        If you are on Metformin (Glucophage), do not restart it until you have blood tests (within 2 to 3 days after discharge).  After you have your blood drawn, you may restart the Metformin.     Take your  "medications, including blood thinners, unless your provider tells you not to.  If you take Coumadin (Warfarin), have your INR checked by your provider in  3-5 days. Call your clinic to schedule this.    If you have stopped any medicines, check with your provider about when to restart them.    Follow Up Appointments:      Follow up with [facility] Nurse Practitioner at [facility] of patient preference in 7-10 days.    Call the clinic if:      You have a large or growing hard lump around the site.    The site is red, swollen, hot or tender.    Blood or fluid is draining from the site.    You have chills or a fever greater than 101 F (38 C).    Your arm feels numb, cool or changes color.    You have hives, a rash or unusual itching.    Any questions or concerns.          [facility] [place]:    [phone] [facility] (7 days a week)          If an appointment was recommended for you please call ASAP to schedule.   DUE TO COVID-19 PANDEMIC, MANY CLINICS ARE TEMPORARILY NOT TAKING IN-PERSON APPOINTMENTS. The clinic may schedule you for a virtual or phone visit--if this is the case, please answer your phone. If you develop any symptoms or have any followup questions please call your primary care clinic. If it is an emergency, please dial 911.      Establishing a primary care physician is an important step in maintaining your overall health and identifying risk factors early.  After your post-hospital followup visit, you should continue to see this doctor once a year for a physical and as recommended by that doctor.  You may choose any provider for your post-hospital appointment, but for your convenience, a care coordinator has identified a [facility] clinic near you.      \"[facility]\"  Expert care that's close to home  [facility] ([phone] [address] [place] [address]     Call to schedule your appointment today.  [place] 24/7 " Appointment line: [phone] ([phone])

## 2020-04-24 NOTE — PROGRESS NOTES
04/24/20 1058   Quick Adds   Type of Visit Initial Occupational Therapy Evaluation   Living Environment   Lives With spouse   Living Arrangements house   Home Accessibility stairs to enter home   Number of Stairs, Main Entrance 2   Transportation Anticipated car, drives self;family or friend will provide   Self-Care   Regular Exercise Yes   Activity/Exercise Type walking   Exercise Amount/Frequency daily  (2 miles per day when weather warm)   Functional Level   Ambulation 0-->independent   Transferring 0-->independent   Toileting 0-->independent   Bathing 0-->independent   Dressing 0-->independent   Eating 0-->independent   Communication 0-->understands/communicates without difficulty   Swallowing 0-->swallows foods/liquids without difficulty   Cognition 0 - no cognition issues reported   Fall history within last six months no   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  Risk factors include age, hyperlipidemia, obesity, family history.  Character of pain also sounded typical for angina. s/p angio  Successful PCI of the proximal RCA ruptured plaque with a 4.0 x 16 mm Synergy LUZ, postdilated to 4.7.   Precautions/Limitations   (post angio precautions)   Heart Disease Risk Factors Dislipidemia;Overweight;Stress;High blood pressure;Family history;Gender;Age   Cognitive Status Examination   Orientation orientation to person, place and time   Level of Consciousness alert   Follows Commands (Cognition) WNL   Memory intact   Attention No deficits were identified   Organization/Problem Solving No deficits were identified   Executive Function No deficits were identified   Sensory Examination  "  Sensory Quick Adds No deficits were identified   Pain Assessment   Patient Currently in Pain No   Transfer Skills   Transfer Comments Pt I with all ADLs and functional mobility   Instrumental Activities of Daily Living (IADL)   Previous Responsibilities meal prep;housekeeping;laundry;shopping;yardwork;medication management;finances;driving;work   Activities of Daily Living Analysis   Impairments Contributing to Impaired Activities of Daily Living post surgical precautions  (decreased activity tolerance)   General Therapy Interventions   Planned Therapy Interventions home program guidelines;progressive activity/exercise;risk factor education   Clinical Impression   Criteria for Skilled Therapeutic Interventions Met yes, treatment indicated   OT Diagnosis impaired IADL's   Influenced by the following impairments post angio radial precautions, activity tolerance slightly decreased   Assessment of Occupational Performance 1-3 Performance Deficits   Identified Performance Deficits impaired strenous IADL's ie yardwork, vacuuming, etc   Clinical Decision Making (Complexity) Low complexity   Therapy Frequency   (Eval and 1 treatment only)   Anticipated Discharge Disposition Home with Outpatient Therapy;Home with Assist  (OP CR at [facility], family A with strenuous IADL's)   Risks and Benefits of Treatment have been explained. Yes   Patient, Family & other staff in agreement with plan of care Yes   [facility] AM-PAC  \"6 Clicks\" Daily Activity Inpatient Short Form   1. Putting on and taking off regular lower body clothing? 4 - None   2. Bathing (including washing, rinsing, drying)? 4 - None   3. Toileting, which includes using toilet, bedpan or urinal? 4 - None   4. Putting on and taking off regular upper body clothing? 4 - None   5. Taking care of personal grooming such as brushing teeth? 4 - None   6. Eating meals? 4 - None   Daily Activity Raw Score (Score out of 24.Lower scores equate to lower levels of function) 24 "

## 2020-04-24 NOTE — PROGRESS NOTES
[facility]    Cardiology Progress Note     Assessment & Plan   Amado Veliz is a 60 year old male who was admitted on 4/22/2020.    1. Unstable angina s/p PCI of pRCA with LUZ   2. Hyperlipidemia    Plan:  1. DAPT for at least 1 yr, if not longer.  2. Lipitor 40 mg. Check labs in 3 months and uptitrate if needed.   3. Cardiac rehab. Lifestyle modification.     Cardiology to sign off.     Abelino Wayne MD  Text Page (7am - 5pm, M-F)    Interval History   No complains today. No chest pain or SOB     Physical Exam   Temp: 98.5  F (36.9  C) Temp src: Oral BP: 120/83 Pulse: 63 Heart Rate: 70 Resp: 16 SpO2: 98 % O2 Device: None (Room air) Oxygen Delivery: 2 LPM  Vitals:    04/22/20 1141 04/23/20 0738 04/24/20 0631   Weight: 108.9 kg (240 lb) 114.7 kg (252 lb 12.8 oz) 111.6 kg (246 lb)     Vital Signs with Ranges  Temp:  [96.7  F (35.9  C)-98.5  F (36.9  C)] 98.5  F (36.9  C)  Pulse:  [61-87] 63  Heart Rate:  [70-89] 70  Resp:  [15-16] 16  BP: (102-137)/() 120/83  SpO2:  [95 %-100 %] 98 %  I/O last 3 completed shifts:  In: 416.25 [P.O.:240; I.V.:176.25]  Out: -   Patient Active Problem List   Diagnosis     Hemorrhage of rectum and anus     Mixed hyperlipidemia     HYPERLIPIDEMIA LDL GOAL <160     Unstable angina (H)     Pharyngoesophageal dysphagia       Constitutional: Alert, no apparent distress,    Lungs: Normal bilateral breath sounds   Cardiovascular: Regular rate and rhythm, normal S1 and S2, and no murmur,    Lymphm node  Neck  ENT  Neurologic  Abdomen: No enlargement  No jugular vein extension or carotid bruit  No apparent abnormality  No focal deficit  Normal bowel sounds, soft, no distension, no tender   Skin: Normal, Catheterization site without hematoma or bleed. 2+ distal pulse.    Extremity: No edema       Medications     Percutaneous Coronary Intervention orders placed (this is information for BPA alerting)       ACE/ARB/ARNI NOT PRESCRIBED       ACE/ARB/ARNI NOT PRESCRIBED          aspirin  81 mg Oral Daily     atorvastatin  40 mg Oral QPM     metoprolol succinate  50 mg Oral Daily     pantoprazole  40 mg Oral QAM AC     sodium chloride (PF)  3 mL Intracatheter Q8H     ticagrelor  90 mg Oral Q12H       Data   Results for orders placed or performed during the hospital encounter of 04/22/20 (from the past 24 hour(s))   Activated clotting time POCT   Result Value Ref Range    Activated Clot Time 251 (H) 75 - 150 sec   Cardiac Catheterization    Narrative    1.  Moderate proximal RCA stenosis by angiography, but with clear ruptured   plaque on OCT.  2.  Successful PCI of the proximal RCA ruptured plaque with a 4.0 x 16 mm   Synergy LUZ, postdilated to 4.7.  3.  Mild nonobstructive CAD elsewhere.   EKG 12-lead, tracing only   Result Value Ref Range    Interpretation ECG Click View Image link to view waveform and result    Nutrition Services Adult IP Consult    Narrative    Cecilia Robertson RD, LD     4/24/2020  9:52 AM  Briefly visited with patient via phone (due to COVID-19   pandemic).  He is ready to discharge home but is interested in   receiving low fat, low sodium diet education in the mail for   later review.    He did not have any questions at the time of my visit and   indicated that he and his wife are familiar with low fat, low   sodium diet guidelines in general.    Information mailed out per patient request.     Cecilia Robertson RD, MIMA, [facility]   Clinical Dietitian - [facility]      Basic metabolic panel   Result Value Ref Range    Sodium 140 133 - 144 mmol/L    Potassium 4.6 3.4 - 5.3 mmol/L    Chloride 109 94 - 109 mmol/L    Carbon Dioxide 29 20 - 32 mmol/L    Anion Gap 2 (L) 3 - 14 mmol/L    Glucose 100 (H) 70 - 99 mg/dL    Urea Nitrogen 10 7 - 30 mg/dL    Creatinine 0.80 0.66 - 1.25 mg/dL    GFR Estimate >90 >60 mL/min/[1.73_m2]    GFR Estimate If Black >90 >60 mL/min/[1.73_m2]    Calcium 8.5 8.5 - 10.1 mg/dL   CBC with platelets   Result Value Ref Range    WBC  6.4 4.0 - 11.0 10e9/L    RBC Count 4.59 4.4 - 5.9 10e12/L    Hemoglobin 14.1 13.3 - 17.7 g/dL    Hematocrit 40.7 40.0 - 53.0 %    MCV 89 78 - 100 fl    MCH 30.7 26.5 - 33.0 pg    MCHC 34.6 31.5 - 36.5 g/dL    RDW 12.7 10.0 - 15.0 %    Platelet Count 203 150 - 450 10e9/L

## 2020-04-24 NOTE — PROGRESS NOTES
A/O, VSS, O2sats 99% on RA. Tele SR. R radial site WDL, CMS intact. All discharge instructions, prescriptions, and personal belongings given to pt. Site care reviewed along with medications.  All questions answered. Pt d/c to home via family.

## 2020-04-24 NOTE — PLAN OF CARE
VSS on RA. Right radial site WDL, CMS intact, TR band removed last evening around 2200. Up independently, gait steady. No complaints of pain, SOB, or dizziness. Plan for discharge this AM.

## 2020-04-24 NOTE — PLAN OF CARE
Discharge Planner OT   Patient plan for discharge: home  Current status: Eval and treatment only. Pt lives in a house with his wife and works full time as a contractor in the construction business. Pt admitted due to unstable angina, s/p angio with LUZ to proximal RCA.  Pt independent with mobility and ambulated to CR satellite and back and completed TDM for 16 mins with gradual increase to 2.2 mph and completed 15 stairs, pt reports feeling good and vitals stable, see vs flow sheet. Pt educated in CAD risk factors with focus on low sodium and low cholesterol diet and exercise and OP CR when appropriate. Pt receptive to all info.   Barriers to return to prior living situation: none with family A as needed.   Recommendations for discharge: home with family A as needed with strenuous IADL's ie mowing lawn and oP CR at [facility] when appropriate.   Rationale for recommendations: pt discharging, met all goals. Recommend OP CR for monitored progressive exercise and risk factor education and modification.      Occupational Therapy Discharge Summary    Reason for therapy discharge:    All goals and outcomes met, no further needs identified.    Progress towards therapy goal(s). See goals on Care Plan in [facility] electronic health record for goal details.  Goals met    Therapy recommendation(s):    Continued therapy is recommended.  Rationale/Recommendations:  home with family A with strenuous IADLs ie yardwork and OP CR at [facility] for monitored progressive exercise and risk factor education and modification.           Entered by: Kay Pittman 04/24/2020 11:35 AM

## 2020-04-25 ENCOUNTER — TELEPHONE (OUTPATIENT)
Facility: CLINIC | Age: 60
End: 2020-04-25

## 2020-04-25 ENCOUNTER — HOSPITAL ENCOUNTER (OUTPATIENT)
Facility: CLINIC | Age: 60
Setting detail: OBSERVATION
Discharge: HOME | End: 2020-04-26
Attending: EMERGENCY MEDICINE | Admitting: HOSPITALIST
Payer: COMMERCIAL

## 2020-04-25 ENCOUNTER — NURSE TRIAGE (OUTPATIENT)
Dept: NURSING | Facility: CLINIC | Age: 60
End: 2020-04-25

## 2020-04-25 ENCOUNTER — APPOINTMENT (OUTPATIENT)
Dept: GENERAL RADIOLOGY | Facility: CLINIC | Age: 60
End: 2020-04-25
Attending: EMERGENCY MEDICINE
Payer: COMMERCIAL

## 2020-04-25 DIAGNOSIS — Z95.5 S/P RIGHT CORONARY ARTERY (RCA) STENT PLACEMENT: ICD-10-CM

## 2020-04-25 DIAGNOSIS — R07.9 CHEST PAIN, UNSPECIFIED TYPE: Primary | ICD-10-CM

## 2020-04-25 DIAGNOSIS — E78.5 HYPERLIPIDEMIA LDL GOAL <70: ICD-10-CM

## 2020-04-25 DIAGNOSIS — Z87.891 FORMER TOBACCO USE: ICD-10-CM

## 2020-04-25 LAB
ALBUMIN SERPL-MCNC: 4.2 G/DL (ref 3.4–5)
ALP SERPL-CCNC: 47 U/L (ref 40–150)
ALT SERPL W P-5'-P-CCNC: 39 U/L (ref 0–70)
ANION GAP SERPL CALCULATED.3IONS-SCNC: 4 MMOL/L (ref 3–14)
AST SERPL W P-5'-P-CCNC: 33 U/L (ref 0–45)
BASOPHILS # BLD AUTO: 0 10E9/L (ref 0–0.2)
BASOPHILS NFR BLD AUTO: 0.4 %
BILIRUB DIRECT SERPL-MCNC: 0.2 MG/DL (ref 0–0.2)
BILIRUB SERPL-MCNC: 0.8 MG/DL (ref 0.2–1.3)
BUN SERPL-MCNC: 10 MG/DL (ref 7–30)
CALCIUM SERPL-MCNC: 9.2 MG/DL (ref 8.5–10.1)
CHLORIDE SERPL-SCNC: 109 MMOL/L (ref 94–109)
CO2 SERPL-SCNC: 25 MMOL/L (ref 20–32)
CREAT SERPL-MCNC: 0.72 MG/DL (ref 0.66–1.25)
D DIMER PPP FEU-MCNC: 0.4 UG/ML FEU (ref 0–0.5)
DIFFERENTIAL METHOD BLD: NORMAL
EOSINOPHIL # BLD AUTO: 0.1 10E9/L (ref 0–0.7)
EOSINOPHIL NFR BLD AUTO: 0.7 %
ERYTHROCYTE [DISTWIDTH] IN BLOOD BY AUTOMATED COUNT: 12.6 % (ref 10–15)
GFR SERPL CREATININE-BSD FRML MDRD: >90 ML/MIN/{1.73_M2}
GLUCOSE SERPL-MCNC: 117 MG/DL (ref 70–99)
HCT VFR BLD AUTO: 43.6 % (ref 40–53)
HGB BLD-MCNC: 15.3 G/DL (ref 13.3–17.7)
IMM GRANULOCYTES # BLD: 0 10E9/L (ref 0–0.4)
IMM GRANULOCYTES NFR BLD: 0.3 %
INTERPRETATION ECG - MUSE: NORMAL
LIPASE SERPL-CCNC: 92 U/L (ref 73–393)
LYMPHOCYTES # BLD AUTO: 1.5 10E9/L (ref 0.8–5.3)
LYMPHOCYTES NFR BLD AUTO: 20.6 %
MCH RBC QN AUTO: 30.9 PG (ref 26.5–33)
MCHC RBC AUTO-ENTMCNC: 35.1 G/DL (ref 31.5–36.5)
MCV RBC AUTO: 88 FL (ref 78–100)
MONOCYTES # BLD AUTO: 0.5 10E9/L (ref 0–1.3)
MONOCYTES NFR BLD AUTO: 7.4 %
NEUTROPHILS # BLD AUTO: 5.2 10E9/L (ref 1.6–8.3)
NEUTROPHILS NFR BLD AUTO: 70.6 %
NRBC # BLD AUTO: 0 10*3/UL
NRBC BLD AUTO-RTO: 0 /100
PLATELET # BLD AUTO: 274 10E9/L (ref 150–450)
POTASSIUM SERPL-SCNC: 3.9 MMOL/L (ref 3.4–5.3)
PROT SERPL-MCNC: 7.7 G/DL (ref 6.8–8.8)
RBC # BLD AUTO: 4.95 10E12/L (ref 4.4–5.9)
SODIUM SERPL-SCNC: 138 MMOL/L (ref 133–144)
TROPONIN I BLD-MCNC: 0.01 UG/L (ref 0–0.08)
TROPONIN I SERPL-MCNC: <0.015 UG/L (ref 0–0.04)
TROPONIN I SERPL-MCNC: <0.015 UG/L (ref 0–0.04)
WBC # BLD AUTO: 7.3 10E9/L (ref 4–11)

## 2020-04-25 PROCEDURE — 80048 BASIC METABOLIC PNL TOTAL CA: CPT | Performed by: EMERGENCY MEDICINE

## 2020-04-25 PROCEDURE — 84484 ASSAY OF TROPONIN QUANT: CPT

## 2020-04-25 PROCEDURE — 85025 COMPLETE CBC W/AUTO DIFF WBC: CPT | Performed by: EMERGENCY MEDICINE

## 2020-04-25 PROCEDURE — 84484 ASSAY OF TROPONIN QUANT: CPT | Performed by: HOSPITALIST

## 2020-04-25 PROCEDURE — 80076 HEPATIC FUNCTION PANEL: CPT | Performed by: EMERGENCY MEDICINE

## 2020-04-25 PROCEDURE — G0378 HOSPITAL OBSERVATION PER HR: HCPCS

## 2020-04-25 PROCEDURE — 93005 ELECTROCARDIOGRAM TRACING: CPT

## 2020-04-25 PROCEDURE — 99220 ZZC INITIAL OBSERVATION CARE,LEVL III: CPT | Performed by: HOSPITALIST

## 2020-04-25 PROCEDURE — 83690 ASSAY OF LIPASE: CPT | Performed by: EMERGENCY MEDICINE

## 2020-04-25 PROCEDURE — 25000132 ZZH RX MED GY IP 250 OP 250 PS 637: Performed by: EMERGENCY MEDICINE

## 2020-04-25 PROCEDURE — 85379 FIBRIN DEGRADATION QUANT: CPT | Performed by: EMERGENCY MEDICINE

## 2020-04-25 PROCEDURE — 36415 COLL VENOUS BLD VENIPUNCTURE: CPT | Performed by: HOSPITALIST

## 2020-04-25 PROCEDURE — 99285 EMERGENCY DEPT VISIT HI MDM: CPT | Mod: 25

## 2020-04-25 PROCEDURE — 71046 X-RAY EXAM CHEST 2 VIEWS: CPT

## 2020-04-25 RX ORDER — ASPIRIN 81 MG/1
324 TABLET, CHEWABLE ORAL ONCE
Status: COMPLETED | OUTPATIENT
Start: 2020-04-25 | End: 2020-04-25

## 2020-04-25 RX ORDER — NITROGLYCERIN 0.4 MG/1
0.4 TABLET SUBLINGUAL EVERY 5 MIN PRN
Status: DISCONTINUED | OUTPATIENT
Start: 2020-04-25 | End: 2020-04-25

## 2020-04-25 RX ORDER — ATORVASTATIN CALCIUM 40 MG/1
40 TABLET, FILM COATED ORAL EVERY EVENING
Status: DISCONTINUED | OUTPATIENT
Start: 2020-04-26 | End: 2020-04-26 | Stop reason: HOSPADM

## 2020-04-25 RX ORDER — ACETAMINOPHEN 650 MG/1
650 SUPPOSITORY RECTAL EVERY 4 HOURS PRN
Status: DISCONTINUED | OUTPATIENT
Start: 2020-04-25 | End: 2020-04-26 | Stop reason: HOSPADM

## 2020-04-25 RX ORDER — ACETAMINOPHEN 325 MG/1
650 TABLET ORAL EVERY 4 HOURS PRN
Status: DISCONTINUED | OUTPATIENT
Start: 2020-04-25 | End: 2020-04-26 | Stop reason: HOSPADM

## 2020-04-25 RX ORDER — METOPROLOL SUCCINATE 50 MG/1
50 TABLET, EXTENDED RELEASE ORAL DAILY
Status: DISCONTINUED | OUTPATIENT
Start: 2020-04-26 | End: 2020-04-26 | Stop reason: HOSPADM

## 2020-04-25 RX ORDER — MORPHINE SULFATE 2 MG/ML
1 INJECTION, SOLUTION INTRAMUSCULAR; INTRAVENOUS
Status: DISCONTINUED | OUTPATIENT
Start: 2020-04-25 | End: 2020-04-26 | Stop reason: HOSPADM

## 2020-04-25 RX ORDER — LIDOCAINE 40 MG/G
CREAM TOPICAL
Status: DISCONTINUED | OUTPATIENT
Start: 2020-04-25 | End: 2020-04-26 | Stop reason: HOSPADM

## 2020-04-25 RX ORDER — NALOXONE HYDROCHLORIDE 0.4 MG/ML
.1-.4 INJECTION, SOLUTION INTRAMUSCULAR; INTRAVENOUS; SUBCUTANEOUS
Status: DISCONTINUED | OUTPATIENT
Start: 2020-04-25 | End: 2020-04-26 | Stop reason: HOSPADM

## 2020-04-25 RX ORDER — NITROGLYCERIN 0.4 MG/1
0.4 TABLET SUBLINGUAL EVERY 5 MIN PRN
Status: DISCONTINUED | OUTPATIENT
Start: 2020-04-25 | End: 2020-04-26 | Stop reason: HOSPADM

## 2020-04-25 RX ORDER — PANTOPRAZOLE SODIUM 40 MG/1
40 TABLET, DELAYED RELEASE ORAL
Status: DISCONTINUED | OUTPATIENT
Start: 2020-04-26 | End: 2020-04-26 | Stop reason: HOSPADM

## 2020-04-25 RX ORDER — ASPIRIN 81 MG/1
81 TABLET ORAL DAILY
Status: DISCONTINUED | OUTPATIENT
Start: 2020-04-26 | End: 2020-04-26 | Stop reason: HOSPADM

## 2020-04-25 RX ORDER — ALUMINA, MAGNESIA, AND SIMETHICONE 2400; 2400; 240 MG/30ML; MG/30ML; MG/30ML
30 SUSPENSION ORAL EVERY 4 HOURS PRN
Status: DISCONTINUED | OUTPATIENT
Start: 2020-04-25 | End: 2020-04-26 | Stop reason: HOSPADM

## 2020-04-25 RX ADMIN — ASPIRIN 81 MG 243 MG: 81 TABLET ORAL at 15:34

## 2020-04-25 ASSESSMENT — ENCOUNTER SYMPTOMS
NAUSEA: 1
SHORTNESS OF BREATH: 0
DIAPHORESIS: 0
LIGHT-HEADEDNESS: 0
FATIGUE: 0

## 2020-04-25 ASSESSMENT — MIFFLIN-ST. JEOR: SCORE: 1968.38

## 2020-04-25 NOTE — PROGRESS NOTES
RECEIVING UNIT ED HANDOFF REVIEW    ED Nurse Handoff Report was reviewed by: Kay Tran RN on April 25, 2020 at 5:14 PM

## 2020-04-25 NOTE — TELEPHONE ENCOUNTER
Cardiology ON CALL brief telephone note:    Paged to call Pt regarding symptoms. Pt recently hospitalized for chest pain s/p coronary angiography with PCI LUZ to [facility], discharged yesterday 4/24/2020. Following discharge he was feeling normal, but a few hours later, he went for a walk and felt chest/epigastric discomfort, nausea. These symptoms have been intermittently present since then. The etiology of his symptoms is not clear. Recommended he go to the ED for further evaluation/management.     Librado German MD, [facility]  Cardiology  Pager:  [phone]  Text Page   April 25, 2020

## 2020-04-25 NOTE — ED TRIAGE NOTES
Patient had stent placed 2 days ago. Today comes in for continued chest pain. States felt great when her left yesterday. Pain comes and goes.

## 2020-04-25 NOTE — ED PROVIDER NOTES
"  History   Chief Complaint:  Chest Pain    HPI   Amado Veliz is a 60 year old male, with a history of mixed hyperlipidemia and unstable angina, who presents to the ED for evaluation of chest pain. The patient called his cardiologist today, Dr. German, stating he was discharged yesterday, but went for a walk today and then he began to feel some chest discomfort and nauseous. He reports the symptoms have been intermittently present since the walk and was instructed to come into the emergency department for further workup. In the emergency department, the patient confirms that he did go on a walk today and felt some nausea, more specially underneath his left rib cage. He notes he could \"feel himself breathing\" which was new to him. He conveys the pain or nausea does radiate back up into his chest, but then will return underneath his rib cage. The patient mentions the pain is not as bad when compared prior to the stent placement. The patient denies pain with deep inspiration or bloody stool. He denies diaphoresis, fatigue, shortness of breath, lightheadedness, or any other acute symptoms. His father does have a history of DVT.    Cardiac Catheterization  1.  Moderate proximal RCA stenosis by angiography, but with clear complex ruptured and ulcerated plaque on OCT.  He continues to have unstable and rest symptoms this week which may be related to recurrent thrombosis, spasm and/or embolization from continued ulceration.  Given his feel odd behavior we recommended proceeding with coronary stenting  2.  Successful PCI of the proximal RCA ruptured plaque with a 4.0 x 16 mm Synergy LUZ, postdilated to 4.7.  3.  Mild nonobstructive CAD elsewhere.    Allergies:  Niacin    Medications:    Aspirin  Lipitor  Toprol  Brilinta    Past Medical History:    Mixed hyperlipidemia  Unstable angina  Hemorrhage of rectum and anus    Past Surgical History:    L5-S1 disk herniation fixture   Stent    Family History:    DVT  Atrial " "fibrillation    Social History:  Smoking status: Former-3/17/1992  Alcohol use: No  Drug use: No  Marital Status:   [2]    Review of Systems   Constitutional: Negative for diaphoresis and fatigue.   Respiratory: Negative for shortness of breath.    Cardiovascular: Positive for chest pain.   Gastrointestinal: Positive for nausea.   Neurological: Negative for light-headedness.   All other systems reviewed and are negative.    Physical Exam     Patient Vitals for the past 24 hrs:   BP Temp Temp src Pulse Resp SpO2 Height Weight   04/25/20 1453 (!) 146/79 97.5  F (36.4  C) Temporal 63 18 96 % 1.88 m (6' 2\") 108.9 kg (240 lb)     Physical Exam  General: Alert, interactive in mild distress  Head:  Scalp is atraumatic  Eyes:  The pupils are equal, round, and reactive to light    EOM's intact    No scleral icterus  ENT:      Nose:  The external nose is normal  Ears:  External ears are normal  Mouth/Throat: The oropharynx is normal    Mucus membranes are moist      Neck:  Normal range of motion.      There is no rigidity.    Trachea is in the midline         CV:  Regular rate and rhythm    No murmur   Resp:  Breath sounds are clear bilaterally    Non-labored, no retractions or accessory muscle use      GI:  Abdomen is soft, no distension, no tenderness.       MS:  Normal strength in all 4 extremities  Skin:  Warm and dry, No rash or lesions noted.  Neuro: Strength 5/5 x4.    Psych:  Awake. Alert.  Normal affect.      Appropriate interactions.    Emergency Department Course   ECG (14:53:06):  Rate 61 bpm. MD interval 186. QRS duration 104. QT/QTc 400/402. P-R-T axes 19 5 41. Normal sinus rhythm. Cannot rule out anterior infarct, age undetermined. Abnormal ECG. Interpreted at 1528 by TriggerBob MD.    Imaging:  Radiology findings were communicated with the patient who voiced understanding of the findings.    XR Chest, 2 views:  No acute cardiopulmonary disease.     Imaging independently reviewed and agree " with radiologist interpretation.    Laboratory:  Laboratory findings were communicated with the patient who voiced understanding of the findings.    CBC: WNL (WBC 7.3, HGB 15.3, )    CMP: , o/w WNL (Creatinine 0.72)    Troponin POCT (1538): 0.01     Lipase: 92    D-dimer: 0.4    Interventions:  1534: Aspirin 243 mg PO    Emergency Department Course:  Past medical records, nursing notes, and vitals reviewed.    1525 I performed an exam of the patient as documented above.     EKG obtained in the ED, see results above.   IV was inserted and blood was drawn for laboratory testing, results above.  The patient was sent for a chest x-ray while in the emergency department, results above.     1626 I rechecked the patient and discussed the results of his workup thus far.     Findings and plan explained to the Patient who consents to admission. Discussed the patient with Dr. Werner, who will admit the patient to a [facility] bed for further monitoring, evaluation, and treatment.    I personally reviewed the laboratory and imaging results with the Patient and answered all related questions prior to admission.     Impression & Plan   Medical Decision Making:  Amado Veliz is a 60 year old male was seen and evaluated. Previous records and cardiac catheterization report was reviewed. Given his recent coronary stent and stuttering chest discomfort, I believe he would benefit from hospitalization. I spoke with Dr. Werner, who is in agreement. Patient remained pain free throughout his stay in the emergency department. He took his home Brilinta, and received Asprin here. There are no signs of pancreatitis, liver disease, pulmonary embolism, pneumonia, pneumothorax, or more concerning illness. Patient was informed of the findings and was in agreement with this plan. He was subsequently brought into the hospital for further evaluation and treatment.      Diagnosis:    ICD-10-CM    1. Chest pain, unspecified type  R07.9       Disposition:  Admitted to [facility] bed.    Scribe Disclosure:  I, James Phillips, am serving as a scribe at 3:25 PM on 4/25/2020 to document services personally performed by Bob Ledezma MD based on my observations and the provider's statements to me.        Bob Ledezma MD  04/25/20 2045

## 2020-04-25 NOTE — PHARMACY-ADMISSION MEDICATION HISTORY
Pharmacy Medication History  Admission medication history interview status for the 4/25/2020  admission is complete. See EPIC admission navigator for prior to admission medications     Medication history sources: Patient  Medication history source reliability: Good - Patient was just discharged from the hospital 4/24/2020.   Adherence assessment: Good      Medication reconciliation completed by provider prior to medication history? No    Time spent in this activity: 15 minutes      Prior to Admission medications    Medication Sig Last Dose Taking? Auth Provider   aspirin (ASA) 81 MG EC tablet Take 1 tablet (81 mg) by mouth daily 4/25/2020 at x 4 doses Yes Brandie Gupta MD   atorvastatin (LIPITOR) 40 MG tablet Take 1 tablet (40 mg) by mouth every evening 4/24/2020 at pm Yes Brandie Gupta MD   metoprolol succinate ER (TOPROL-XL) 50 MG 24 hr tablet Take 1 tablet (50 mg) by mouth daily 4/25/2020 at am Yes Brandie Gupta MD   multivitamin w/minerals (THERA-VIT-M) tablet Take 1 tablet by mouth daily 4/24/2020 at Unknown time Yes Unknown, Entered By History   ticagrelor (BRILINTA) 90 MG tablet Take 1 tablet (90 mg) by mouth every 12 hours 4/25/2020 at x 2 doses Yes Brandie Gupta MD

## 2020-04-25 NOTE — TELEPHONE ENCOUNTER
"Patient calling stating he had stent placed Thursday 4/23/20.  Patient is requesting to know if his medications may need to be adjusted as he feels they are making him nauseated.  Patient reporting after coming home \"went on short walk and I felt slightly winded.\"  Patient is walking today in home and denies shortness of breath. Reporting intermittent nausea feeling in upper abdomen. Denies chest pain.    Paged on call Cardiology through  [facility] to call patient at  [phone]. Patient was advised to call back in 20 minutes if no return call.    Caller verbalized understanding. Denies further questions.    Mechelle Askew RN  [facility]      Reason for Disposition    Taking prescription medication that could cause nausea (e.g., narcotics/opiates, antibiotics, OCPs, many others)    Additional Information    Negative: Shock suspected (e.g., cold/pale/clammy skin, too weak to stand, low BP, rapid pulse)    Negative: Sounds like a life-threatening emergency to the triager    Negative: Unable to walk, or can only walk with assistance (e.g., requires support)    Negative: Difficulty breathing    Negative: [1] Insulin-dependent diabetes (Type I) AND [2] glucose > 400 mg/dl (22 mmol/l)    Negative: [1] Drinking very little AND [2] dehydration suspected (e.g., no urine > 12 hours, very dry mouth, very lightheaded)    Negative: Patient sounds very sick or weak to the triager    Negative: Fever > 104 F (40 C)    Negative: [1] Fever > 101 F (38.3 C) AND [2] age > 60    Negative: [1] Fever > 100.0 F (37.8 C) AND [2] bedridden (e.g., nursing home patient, CVA, chronic illness, recovering from surgery)    Negative: [1] Fever > 100.0 F (37.8 C) AND [2] diabetes mellitus or weak immune system (e.g., HIV positive, cancer chemo, splenectomy, chronic steroids)    Negative: Taking any of the following medications: digoxin (Lanoxin), lithium, theophylline, phenytoin (Dilantin)    Negative: Yellowish color " of the skin or white of the eye (i.e., jaundice)    Negative: Fever present > 3 days (72 hours)    Negative: Receiving cancer chemotherapy medication    Negative: [1] Nausea or vomiting AND [2] pregnancy < 20 weeks    Negative: Menstrual Period - Missed or Late (i.e., pregnancy suspected)    Negative: Heat exhaustion suspected (i.e., dehydration from heat exposure)    Negative: Motion sickness suspected (i.e., nausea with car, plane, boat, or train travel)    Negative: Anxiety or stress suspected (i.e., nausea with anxiety attacks or stressful situations)    Negative: Traumatic Brain Injury (TBI) suspected    Negative: Nausea (or Vomiting) in a cancer patient who is currently (or recently) receiving chemotherapy or radiation therapy, or cancer patient who has metastatic or end-stage cancer and is receiving palliative care    Negative: Vomiting occurs    Negative: Other symptom is present, see that guideline.  (e.g., chest pain, headache, dizziness, abdominal pain, colds, sore throat, etc.).    Protocols used: NAUSEA-A-AH

## 2020-04-25 NOTE — ED NOTES
"[facility]  ED Nurse Handoff Report    ED Chief complaint: Chest Pain      ED Diagnosis:   Final diagnoses:   None       Code Status: Full Code    Allergies:   Allergies   Allergen Reactions     Niacin      flushing       Patient Story: Patient was in the hospitals 2 days ago and had a coronary stent placed.  Complaining of recurrent chest pain since the stent was placed.    Focused Assessment:  Patient alert, orented, not complaints of pain currently.     Treatments and/or interventions provided: 243mg ASA  Patient's response to treatments and/or interventions:     To be done/followed up on inpatient unit:      Does this patient have any cognitive concerns?:     Activity level - Baseline/Home:  Independent  Activity Level - Current:   Independent    Patient's Preferred language: English   Needed?: No    Isolation: None  Infection: Not Applicable  Bariatric?: No    Vital Signs:   Vitals:    04/25/20 1453   BP: (!) 146/79   Pulse: 63   Resp: 18   Temp: 97.5  F (36.4  C)   TempSrc: Temporal   SpO2: 96%   Weight: 108.9 kg (240 lb)   Height: 1.88 m (6' 2\")       Cardiac Rhythm:Cardiac Rhythm: Normal sinus rhythm    Was the PSS-3 completed:   Yes  What interventions are required if any?               Family Comments:   OBS brochure/video discussed/provided to patient/family: Yes              Name of person given brochure if not patient:               Relationship to patient:     For the majority of the shift this patient's behavior was Green.   Behavioral interventions performed were .    ED NURSE PHONE NUMBER: *[phone]         "

## 2020-04-25 NOTE — H&P
[facility]    History and Physical  Hospitalist       Date of Admission:  4/25/2020    Assessment & Plan   Amado Veliz is a 60 year old male who presents with intermittent chest pain/uneasiness after stent placement    Chest pain/nausea  Recent unstable angina s/p PCI of pRCA with LUZ   Stress echo 4/23 showed no stress induced ischemia. Echocardiogram showed EF>70% without wall motion abnormalities. Cardiac cath on 4/23 with LUZ to proximal RCA ruptured plaque. Day of discharge (4/24), was able to tolerate activity without symptoms. Then developed stomach uneasiness/nausea along with chest pain on 4/24 evening that was persistent, thought it was related to his medications. These symptoms are different than symptoms he was having prior to stent placement. PTA on aspirin 81mg, brilinta 90mg BID, toprol XL 50mg daily, and atorvastatin 40mg. He has been compliant with his medications. EKG on admit NSR. Trop 0.01. D dimer 0.4. CXR clear. Received additional 243mg of ASA in ED.  - Admit observation  - telemetry  - Continue PTA medications (already took evening doses 4/25)  - Consult cardiology  - Troponin trend q4h x2 more  - EKG if recurrent symptoms  - Start protonix 40mg daily in case this is potentially gastritis-related with dual antiplatelets.    Hyperlipidemia  Started on atorvastatin 40mg. Took own medication evening of 4/25  - Continue atorvastatin if still here 4/26 evening    Pharyngoesophageal dysphagia  Thought secondary to structural issue, evaluated by [facility] GI group last admit, wanted outpatient work-up with EGD  - Regular diet, with mindfulness about chewing. No worrisome symptoms this admission    DVT Prophylaxis: Low Risk/Ambulatory with no VTE prophylaxis indicated  Code Status: Full Code  Expected discharge: 24- 48 hours, recommended to prior living arrangement once cardiac work-up completed    Marie Werner,     Primary Care Physician   Physician No Ref-Primary    Chief  Complaint   Chest pain/nausea    History is obtained from the patient, previous records    History of Present Illness   Amado Veliz is a 60 year old male who presents with chest pain, and nausea/stomach uneasiness that started evening of admission. He went for a short walk with his wife and felt like he could feel himself breathing, and also noted some stomach uneasiness. The breathing feeling went away quickly, but the nausea and uneasiness of his stomach continued. He also felt some chest pain that went under his sternum, but also felt like it moved down towards his stomach. He is worried that his new medications are causing these symptoms (specifically the metoprolol or the brilinta). He has no symptoms currently in the ED. The chest pain location is different than prior to last admit (before it was underneath his left breast). Denies chills, fever, fatigue, shortness of breath, diaphoresis, vomiting, diarrhea, changes in urinary habits.    Past Medical History    I have reviewed this patient's medical history and updated it with pertinent information if needed.   Past Medical History:   Diagnosis Date     CAD (coronary artery disease)      Intestinal infection due to Clostridium difficile      Mixed hyperlipidemia      Pharyngoesophageal dysphagia        Past Surgical History   I have reviewed this patient's surgical history and updated it with pertinent information if needed.  Past Surgical History:   Procedure Laterality Date     BACK SURGERY      L5-S1 disk herniation      CV PCI STENT DRUG ELUTING         Prior to Admission Medications   Prior to Admission Medications   Prescriptions Last Dose Informant Patient Reported? Taking?   aspirin (ASA) 81 MG EC tablet 4/25/2020 at x 4 doses  No Yes   Sig: Take 1 tablet (81 mg) by mouth daily   atorvastatin (LIPITOR) 40 MG tablet 4/24/2020 at pm  No Yes   Sig: Take 1 tablet (40 mg) by mouth every evening   metoprolol succinate ER (TOPROL-XL) 50 MG 24 hr tablet  4/25/2020 at am  No Yes   Sig: Take 1 tablet (50 mg) by mouth daily   multivitamin w/minerals (THERA-VIT-M) tablet 4/24/2020 at Unknown time  Yes Yes   Sig: Take 1 tablet by mouth daily   ticagrelor (BRILINTA) 90 MG tablet 4/25/2020 at x 2 doses  No Yes   Sig: Take 1 tablet (90 mg) by mouth every 12 hours      Facility-Administered Medications: None     Allergies   Allergies   Allergen Reactions     Niacin      flushing       Social History   I have reviewed this patient's social history and updated it with pertinent information if needed. Amado Veliz  reports that he quit smoking about 28 years ago. He quit after 13.00 years of use. He has never used smokeless tobacco. He reports that he does not drink alcohol or use drugs.    Family History   I have reviewed this patient's family history and updated it with pertinent information if needed.   Family History   Problem Relation Age of Onset     Family History Negative Father      Deep Vein Thrombosis Father      Atrial fibrillation Father      Family History Negative Mother      Family History Negative Brother      Deep Vein Thrombosis Sister      Ovarian Cancer Maternal Grandmother      Coronary Artery Disease Maternal Grandfather        Review of Systems   The 10 point Review of Systems is negative other than noted in the HPI    Physical Exam   Temp: 97.5  F (36.4  C) Temp src: Temporal BP: 138/75 Pulse: 53 Heart Rate: 56 Resp: 18 SpO2: 96 % O2 Device: None (Room air)    Vital Signs with Ranges  Temp:  [97.5  F (36.4  C)] 97.5  F (36.4  C)  Pulse:  [53-63] 53  Heart Rate:  [56-59] 56  Resp:  [16-20] 18  BP: (125-146)/(70-79) 138/75  SpO2:  [96 %] 96 %  240 lbs 0 oz    Constitutional: Awake, alert, cooperative, mild distress.  Eyes: Conjunctiva and pupils examined and normal.  HEENT: Moist mucous membranes, normal dentition.  Respiratory: Clear to auscultation bilaterally, no crackles or wheezing.  Cardiovascular: Regular rate and rhythm, normal S1 and S2, and  no murmur noted. No reproducible chest pain  GI: Soft, non-distended, non-tender, normal bowel sounds.  Lymph/Hematologic: No anterior cervical or supraclavicular adenopathy.  Skin: No rashes, no cyanosis, no edema.  Musculoskeletal: No joint swelling, erythema or tenderness.  Neurologic: Cranial nerves 2-12 intact, normal strength and sensation.  Psychiatric: Alert, oriented to person, place and time, no obvious anxiety or depression.    Data   Data reviewed today:  I personally reviewed CXR without infiltrate. EKG compared to prior shows no new ST changes, sinus rhythm  Recent Labs   Lab 04/25/20  1538 04/25/20  1522 04/24/20  0607 04/23/20  0104 04/22/20  2041 04/22/20  1656 04/22/20  1420 04/22/20  1222   WBC  --  7.3 6.4  --   --  8.1  --  6.5   HGB  --  15.3 14.1  --   --  15.8  --  15.9   MCV  --  88 89  --   --  89  --  88   PLT  --  274 203  --   --  245  --  241   NA  --  138 140  --   --   --   --  137   POTASSIUM  --  3.9 4.6  --   --   --   --  3.8   CHLORIDE  --  109 109  --   --   --   --  107   CO2  --  25 29  --   --   --   --  23   BUN  --  10 10  --   --   --   --  13   CR  --  0.72 0.80  --   --   --   --  0.69   ANIONGAP  --  4 2*  --   --   --   --  7   DANIELITO  --  9.2 8.5  --   --   --   --  9.4   GLC  --  117* 100*  --   --   --   --  97   ALBUMIN  --  4.2  --   --   --   --   --  4.2   PROTTOTAL  --  7.7  --   --   --   --   --  7.7   BILITOTAL  --  0.8  --   --   --   --   --  0.8   ALKPHOS  --  47  --   --   --   --   --  48   ALT  --  39  --   --   --   --   --  30   AST  --  33  --   --   --   --   --  19   LIPASE  --  92  --   --   --   --   --  77   TROPI  --   --   --  <0.015 <0.015  --  <0.015 <0.015   TROPONIN 0.01  --   --   --   --   --   --   --        Recent Results (from the past 24 hour(s))   XR Chest 2 Views    Narrative    XR CHEST 2 VW   4/25/2020 3:35 PM     HISTORY: CP    COMPARISON: 4/22/2020      Impression    IMPRESSION: No acute cardiopulmonary disease.    DODIE BLANCO  MD HARRY

## 2020-04-26 VITALS
OXYGEN SATURATION: 98 % | HEIGHT: 74 IN | RESPIRATION RATE: 16 BRPM | HEART RATE: 53 BPM | DIASTOLIC BLOOD PRESSURE: 69 MMHG | SYSTOLIC BLOOD PRESSURE: 130 MMHG | WEIGHT: 244.5 LBS | BODY MASS INDEX: 31.38 KG/M2 | TEMPERATURE: 97.8 F

## 2020-04-26 PROCEDURE — 93005 ELECTROCARDIOGRAM TRACING: CPT

## 2020-04-26 PROCEDURE — G0378 HOSPITAL OBSERVATION PER HR: HCPCS

## 2020-04-26 PROCEDURE — 99207 ZZC CDG-CODE CATEGORY CHANGED: CPT | Performed by: PHYSICIAN ASSISTANT

## 2020-04-26 PROCEDURE — 25000132 ZZH RX MED GY IP 250 OP 250 PS 637: Performed by: HOSPITALIST

## 2020-04-26 PROCEDURE — 99217 ZZC OBSERVATION CARE DISCHARGE: CPT | Performed by: PHYSICIAN ASSISTANT

## 2020-04-26 PROCEDURE — 93010 ELECTROCARDIOGRAM REPORT: CPT | Performed by: INTERNAL MEDICINE

## 2020-04-26 PROCEDURE — 99214 OFFICE O/P EST MOD 30 MIN: CPT | Performed by: INTERNAL MEDICINE

## 2020-04-26 RX ORDER — PANTOPRAZOLE SODIUM 40 MG/1
40 TABLET, DELAYED RELEASE ORAL
Qty: 14 TABLET | Refills: 0 | Status: SHIPPED | OUTPATIENT
Start: 2020-04-27 | End: 2020-05-20

## 2020-04-26 RX ORDER — NITROGLYCERIN 0.4 MG/1
TABLET SUBLINGUAL
Qty: 30 TABLET | Refills: 0 | Status: SHIPPED | OUTPATIENT
Start: 2020-04-26 | End: 2020-07-24

## 2020-04-26 RX ADMIN — ASPIRIN 81 MG: 81 TABLET, DELAYED RELEASE ORAL at 09:35

## 2020-04-26 RX ADMIN — PANTOPRAZOLE SODIUM 40 MG: 40 TABLET, DELAYED RELEASE ORAL at 06:02

## 2020-04-26 RX ADMIN — METOPROLOL SUCCINATE 50 MG: 50 TABLET, EXTENDED RELEASE ORAL at 09:35

## 2020-04-26 RX ADMIN — TICAGRELOR 90 MG: 90 TABLET ORAL at 06:02

## 2020-04-26 ASSESSMENT — MIFFLIN-ST. JEOR: SCORE: 1988.79

## 2020-04-26 NOTE — CONSULTS
Inpatient Cardiology Consultation   [facility]  Date of Admission:4/25/2020  Date of Consult: 4/26/2020    Inpatient cardiology consultation note has been dictated. Dictation number [id number]        Mattie Drew MD [facility]  Cardiology              REVIEW OF SYSTEMS:  A comprehensive 10 point review of systems was completed and the pertinent positives are documented in history of present illness.    MEDICATIONS:  Prior to Admission Medications   Prescriptions Last Dose Informant Patient Reported? Taking?   aspirin (ASA) 81 MG EC tablet 4/25/2020 at x 4 doses  No Yes   Sig: Take 1 tablet (81 mg) by mouth daily   atorvastatin (LIPITOR) 40 MG tablet 4/24/2020 at pm  No Yes   Sig: Take 1 tablet (40 mg) by mouth every evening   metoprolol succinate ER (TOPROL-XL) 50 MG 24 hr tablet 4/25/2020 at am  No Yes   Sig: Take 1 tablet (50 mg) by mouth daily   multivitamin w/minerals (THERA-VIT-M) tablet 4/24/2020 at Unknown time  Yes Yes   Sig: Take 1 tablet by mouth daily   ticagrelor (BRILINTA) 90 MG tablet 4/25/2020 at x 2 doses  No Yes   Sig: Take 1 tablet (90 mg) by mouth every 12 hours      Facility-Administered Medications: None       ALLERGIES:  Allergies   Allergen Reactions     Niacin      flushing       PAST MEDICAL HISTORY:  Past Medical History:   Diagnosis Date     CAD (coronary artery disease)      Intestinal infection due to Clostridium difficile      Mixed hyperlipidemia      Pharyngoesophageal dysphagia        PAST SURGICAL HISTORY:  Past Surgical History:   Procedure Laterality Date     BACK SURGERY      L5-S1 disk herniation      CV PCI STENT DRUG ELUTING         SOCIAL HISTORY:   Amado Veliz  reports that he quit smoking about 28 years ago. He quit after 13.00 years of use. He has never used smokeless tobacco. He reports that he does not drink alcohol or use drugs.    FAMILY HISTORY:  Family History   Problem Relation Age of Onset     Family History Negative Father      Deep Vein  Thrombosis Father      Atrial fibrillation Father      Family History Negative Mother      Family History Negative Brother      Deep Vein Thrombosis Sister      Ovarian Cancer Maternal Grandmother      Coronary Artery Disease Maternal Grandfather        PHYSICAL EXAMINATION:  Temp: 97.8  F (36.6  C) Temp src: Oral BP: 125/72 Pulse: 53 Heart Rate: 61 Resp: 16 SpO2: 99 % O2 Device: None (Room air)    04/21 0700 - 04/26 0659  In: 240 [P.O.:240]  Out: -   Net: 240  Vitals:    04/25/20 1453 04/26/20 0611   Weight: 108.9 kg (240 lb) 110.9 kg (244 lb 8 oz)       Constitutional: Comfortable at rest. Cooperative, alert and oriented, well developed, well nourished.  Eyes: Pupils equal and round, conjunctivae and lids unremarkable, sclera white, no xanthalasma,   ENT: Satisfactory dentition.  No cyanosis or pallor.  Neck: No thyromegaly.     Respiratory: Normal respiratory effort with symmetrical chest wall movements and no use of accessory muscles. Bilateral normal breath sounds. No rales or wheeze.  Cardiovascular: Normal jugular venous pulse and pressure.  Normal carotid pulse character and volume.  No carotid bruit.  Apical impulse is undisplaced and normal to palpation without parasternal heave or thrill.  Heart sounds are normal and regular.  No S3 or S4.  No audible murmur or friction rub.  GI: Soft, nontender, no hepatosplenomegaly, no masses, active bowel sounds.    Skin: No rash, erythema, ecchymosis.  Musculoskeletal: Normal muscle tone and strength. Normal gait. No spinal deformities.  Neuropsychiatric: Oriented to time place and person.  Affect normal.  No gross motor deficits.  Extremities: No edema, clubbing or deformities.    Mattie Drew MD

## 2020-04-26 NOTE — PLAN OF CARE
Pt alert, oriented and able to initiate needs.  Vitals remain stable and pt is sinus rafael/sinus rhythm.  Independent in room. Pt does state that intermittent left rib cage area discomfort increases when he puts pressure on that area.  Pt has denied chest pain/pressure or other issues to this nurse.  Jerrod given this am.  Troponin remains negative.  Plan to see cardiology today.

## 2020-04-26 NOTE — PLAN OF CARE
Patient is alert and oriented times 4.  cms is intact. No co pain or SOB. Patient is up indep.  pt voiding per  BR. Awaiting Cardiology consult for CP, trending troponin's.

## 2020-04-26 NOTE — PLAN OF CARE
A/Ox4. VSS. C/o intermittent tightness stays for secs to minutes at a time. Lung sounds are clear. Skin is intact. Aggression color green. Up independently.  Tele SR. Discharge instructions given to patient all questions and concerns addressed.

## 2020-04-26 NOTE — PLAN OF CARE
[id number]:  Very pleasant, VSS on RA, denies CP/SOB or other symptoms. Tele SB, asymptomatic. Troponins trending negative at this time. Next draw 2330. Up indp, voiding.

## 2020-04-26 NOTE — CONSULTS
"  INPATIENT CARDIOLOGY CONSULT.  LOCATION:  [facility].   Consult Date:  04/26/2020     REFERRAL SOURCE:  Marie Werner, Hospitalist Service.      REASON FOR CONSULTATION:    Atypical chest pain in a patient who underwent recent coronary stenting on 04/23/2020.      HISTORY OF PRESENT ILLNESS:    Mr. Veliz is a 60-year-old  gentleman known to have coronary artery disease, status post stenting of the proximal right coronary artery 3 days ago on 04/23/2020, hyperlipidemia with LDL of 160, 13-pack-year smoking history (quit in 1993).      He was recently hospitalized with chest pain, troponin negative, negative stress test, but due to ongoing symptoms, he underwent a coronary angiogram on 04/23/2020 by Dr. Walker.  He was found to have moderate proximal right coronary artery stenosis by angiography, but there was a complex ulcerated plaque that had ruptured on OCT.  Due to her rest symptoms, he underwent a successful PCI of the proximal RCA ruptured plaque with a 4 x 16 mm Synergy drug-eluting stent that was postdilated and had excellent result.      I personally reviewed his coronary angiogram and he has minimal nonobstructive coronary artery disease elsewhere.  His echocardiogram showed preserved LVEF of greater than 70%, but showed hyperdynamic systolic function with LVEF greater than 70% without any dynamic obstruction and no significant valve disease.  Renal function is normal at 0.72.      He was discharged home on Friday and later in the day, he started developing what he describes as \"waves of some discomfort in his upper abdomen/lower sternal area.\"  It came and went for about 24 hours, not really occurring at rest, not related or worsened by exercise.  Notably, this is different from his left-sided chest discomfort that prompted his angiography.      Because of ongoing symptoms, he came to the emergency room.  He has not tried sublingual nitroglycerin.  In the hospital, his serial " troponins have been negative.  ECG shows normal sinus rhythm without any ischemic changes.  He has been hemodynamically and rhythmically stable.  He has been consistently taking his dual antiplatelet therapy with aspirin and ticagrelor (Brilinta).  He is also on metoprolol XL 50 mg daily.  His blood pressure is well controlled at 130/70 mmHg and heart rate is 58 BPM.      PHYSICAL EXAMINATION:   GENERAL:  He is alert, oriented x 3, appears younger than his stated age and is in good spirits.      DIAGNOSIS, ASSESSMENT AND PLAN:   1.  Atypical, troponin-negative chest pain, unlikely to be coronary in etiology.   2.  Status post recent PCI to ruptured plaque in proximal right coronary artery on 2020 with mild nonobstructive CAD in other arteries.   3.  Hyperlipidemia with very high LDL.   4.  Former tobacco user.      ASSESSMENT AND PLAN:    The patient presents with symptoms that lasted several hours, different from his presenting angina, atypical for ischemia, with no ECG changes and negative cardiac markers.  He himself admits that this could possibly be related to anxiety that he has been experiencing since undergoing a cardiac procedure.  I do not think additional evaluation is necessary at this point.     1.  Reassured patient.   2.  Continue uninterrupted dual antiplatelet therapy with aspirin 81 mg and ticagrelor 90 mg b.i.d.   3.  If symptoms continue, it is possible that it is a side effect of the Brilinta and can consider switching to clopidogrel after a loading dose of 600 mg.  This can be evaluated at his post-hospital followup in 1 or 2 weeks.      It was my pleasure to visit with this gentleman.         NAVJOT DUNCAN MD             D: 2020   T: 2020   MT: MIRIAM      Name:     NATE OLEA   MRN:      [id number]        Account:       [id number]   :      1960           Consult Date:  2020      Document: [id number]

## 2020-04-26 NOTE — DISCHARGE SUMMARY
"[facility]    Discharge Summary  Hospitalist    Date of Admission:  4/25/2020  Date of Discharge:  4/26/2020  Discharging Provider: Estefanía Decker PA-C    Discharge Diagnoses   Chest pain  Coronary artery disease s/p LUZ to RCA  HLD  Pharyngoesophageal dysphagia    History of Present Illness   Amado Veliz is an 60 year old male with a past medical history significant for HLD, pharyngoesophageal dyspagia, and coronary artery disease s/p recent stenting to RCA 4/23/2020 who was admitted on 4/25/2020 after presenting with chest pain. He was recently admitted at [facility] from 4/22/20-4/24/20 after presenting with progressive chest pain. He ultimately underwent cardiac catheterization with LUZ placed to RCA. He discharged home and reported feeling well at that time. On day of admission he went for a short walk with his wife and shortly after felt some stomach \"uneasiness\" and was more aware of his breathing. He developed some discomfort in his upper stomach and lower chest and called clinic who advised him to present to the ED. Please see admission H&P by Dr. Werner for additional information.     Hospital Course   Amado Veliz was admitted on 4/25/2020.  The following problems were addressed during his hospitalization:    Atypical chest pain/nausea  Recent unstable angina s/p PCI of pRCA with LUZ   Stress echo 4/23 showed no stress induced ischemia. Echocardiogram showed EF>70% without wall motion abnormalities. Cardiac cath on 4/23 with LUZ to proximal RCA ruptured plaque. Day of discharge (4/24), was able to tolerate activity without symptoms. Then developed stomach uneasiness/nausea along with chest pain on 4/24 evening that was persistent, thought it was related to his medications. These symptoms are different than symptoms he was having prior to stent placement. PTA on aspirin 81mg, brilinta 90mg BID, toprol XL 50mg daily, and atorvastatin 40mg. He has been compliant with his medications. " EKG on admit NSR. Trop negative x3. D dimer 0.4. CXR clear. Received additional 243mg of ASA in ED.  --Cardiology was consulted during admission, felt unlikely to be cardiac and no need for further inpatient cardiac evaluation. If symptoms do not improve will consider switch from Brilinta to plavix- will discuss at cardiology follow up in 2 weeks  --continue all PTA medications at discharge   --will discharge with prn nitro   - Start protonix 40mg daily in case this is potentially gastritis-related with dual antiplatelets. Plan for 2 week course.      Hyperlipidemia  Started on atorvastatin 40mg. Took own medication evening of 4/25  - Continue atorvastatin      Pharyngoesophageal dysphagia  Thought secondary to structural issue, evaluated by [facility] GI group last admit, wanted outpatient work-up with EGD  - Regular diet, with mindfulness about chewing. No worrisome symptoms this admission     This patient was discussed with Dr. Limon who agrees with the above plan.    Estefanía Decker PA-C, PAKianaC    Significant Results and Procedures   See below    Code Status   Full Code       Primary Care Physician   Physician No Ref-Primary    Physical Exam   Temp: 97.8  F (36.6  C) Temp src: Oral BP: 130/69 Pulse: 53 Heart Rate: 58 Resp: 16 SpO2: 98 % O2 Device: None (Room air)    Vitals:    04/25/20 1453 04/26/20 0611   Weight: 108.9 kg (240 lb) 110.9 kg (244 lb 8 oz)     Vital Signs with Ranges  Temp:  [97.5  F (36.4  C)-97.8  F (36.6  C)] 97.8  F (36.6  C)  Pulse:  [53-63] 53  Heart Rate:  [52-64] 58  Resp:  [16-20] 16  BP: (116-138)/(61-77) 130/69  SpO2:  [95 %-99 %] 98 %  I/O last 3 completed shifts:  In: 540 [P.O.:540]  Out: -     Constitutional: alert and oriented. Sitting up in chair. Appears comfortable and is appropriately conversant.   ENT: normal cephalic, moist mucous membranes  Eyes:  Sclera anicteric, EOMI  Respiratory: Lungs clear to auscultation bilaterally, no increased work of breathing or  wheezing  Cardiovascular: Regular rate and rhythm, no murmur, no LE edema, distal pulses +2/4  GI: active bowel sounds, abdomen soft, non-tender  MSK:  Moves all four extremities. Normal tone. No chest wall tenderness with palpation   Neuro:  Speech is clear. Face symmetric. No obvious focal deficits.     Discharge Disposition   Discharged to home  Condition at discharge: Stable    Consultations This Hospital Stay   CARDIOLOGY IP CONSULT    Time Spent on this Encounter   I, Estefanía Decker PA-C, personally saw the patient today and spent greater than 30 minutes discharging this patient.    Discharge Orders   No discharge procedures on file.  Discharge Medications   Current Discharge Medication List      CONTINUE these medications which have NOT CHANGED    Details   aspirin (ASA) 81 MG EC tablet Take 1 tablet (81 mg) by mouth daily  Qty: 100 tablet, Refills: 0    Associated Diagnoses: Unstable angina (H)      atorvastatin (LIPITOR) 40 MG tablet Take 1 tablet (40 mg) by mouth every evening  Qty: 30 tablet, Refills: 0    Associated Diagnoses: Unstable angina (H)      metoprolol succinate ER (TOPROL-XL) 50 MG 24 hr tablet Take 1 tablet (50 mg) by mouth daily  Qty: 30 tablet, Refills: 0    Associated Diagnoses: Unstable angina (H)      multivitamin w/minerals (THERA-VIT-M) tablet Take 1 tablet by mouth daily      ticagrelor (BRILINTA) 90 MG tablet Take 1 tablet (90 mg) by mouth every 12 hours  Qty: 30 tablet, Refills: 0    Associated Diagnoses: Unstable angina (H)           Allergies   Allergies   Allergen Reactions     Niacin      flushing     Data   Most Recent 3 CBC's:  Recent Labs   Lab Test 04/25/20  1522 04/24/20  0607 04/22/20  1656   WBC 7.3 6.4 8.1   HGB 15.3 14.1 15.8   MCV 88 89 89    203 245      Most Recent 3 BMP's:  Recent Labs   Lab Test 04/25/20  1522 04/24/20  0607 04/22/20  1222    140 137   POTASSIUM 3.9 4.6 3.8   CHLORIDE 109 109 107   CO2 25 29 23   BUN 10 10 13   CR 0.72 0.80 0.69    ANIONGAP 4 2* 7   DANIELITO 9.2 8.5 9.4   * 100* 97     Most Recent 2 LFT's:  Recent Labs   Lab Test 04/25/20  1522 04/22/20  1222   AST 33 19   ALT 39 30   ALKPHOS 47 48   BILITOTAL 0.8 0.8     Most Recent INR's and Anticoagulation Dosing History:  Anticoagulation Dose History     There is no flowsheet data to display.        Most Recent 3 Troponin's:  Recent Labs   Lab Test 04/25/20  2333 04/25/20 2016 04/25/20  1538 04/23/20  0104   TROPI <0.015 <0.015  --  <0.015   TROPONIN  --   --  0.01  --      Most Recent Cholesterol Panel:  Recent Labs   Lab Test 04/22/20  1656   CHOL 231*   *   HDL 36*   TRIG 186*     Most Recent 6 Bacteria Isolates From Any Culture (See EPIC Reports for Culture Details):No lab results found.  Most Recent TSH, T4 and A1c Labs:  Recent Labs   Lab Test 04/22/20  1656 04/22/20  1222   TSH 3.29  --    A1C  --  5.0     Results for orders placed or performed during the hospital encounter of 04/25/20   XR Chest 2 Views    Narrative    XR CHEST 2 VW   4/25/2020 3:35 PM     HISTORY: CP    COMPARISON: 4/22/2020      Impression    IMPRESSION: No acute cardiopulmonary disease.    DODIE MCDONALD MD

## 2020-04-27 ENCOUNTER — TELEPHONE (OUTPATIENT)
Dept: CARDIOLOGY | Facility: CLINIC | Age: 60
End: 2020-04-27

## 2020-04-27 NOTE — TELEPHONE ENCOUNTER
"Patient was evaluated by cardiology while inpatient for chest pain with exertion that has been increasing in frequency-relieved with rest without troponin elevation or WMA on echo. 4/23/20: Coronary angiogram via RRA resulted in successful PCI of the proximal RCA ruptured plaque with a 4.0 x 16 mm Synergy LUZ, post dilated to 4.7. Mild nonobstructive CAD elsewhere. Pt was discharged to home on Brilinta, ASA, Lipitor and Metoprolol. Returned the following day with reoccurrence of chest pain without EKG changes or troponin elevation. Dr. Drew feels this is non-cardiac related chest pain. Pt was discharged with PRN SL NTG at time of this discharge. Called patient to discuss any post hospital d/c questions, review medication changes, and confirm f/u appts. RN confirmed with patient that he was d/c with the antiplatelet Brilinta. Pt has an Rx for PRN SL Nitroglycerin. Patient denied any SOB, fever or light headedness, but continues to experience 1-2/10 midsternal, non radiating chest tighthness without other symptoms. States tightness can last up to 45 minutes, and pt verbalizing frustrations as he feels this is not normal. Asking if tighthness could be related to new medications and side effects. Pt has not tried PRN SL NTG yet. Encouraged pt to try NTG for persistent chest tightness to determine if symptoms improve or resolve. Instructed if symptoms worsen, go back to ED. Again pt verbalized frustration, as he had done this after being discharged the first time, but no elevation in troponin and no EKG changes noted so Dr. Drew felt was non-cardiac related-possibly anxiety related. Pt states, \"When I came in the first time, I didn't have any EKG changes or changes in my cardiac enzymes either, yet when they did the coronary angiogram, low and behold, I had a stent placed. How do we know that is not what is happening again. \" Pt would like to wait to try NTG for a longer episode of chest tightness. Instructed pt to call " scheduling and have f/u HARRIET Video Visit rescheduled for tomorrow. RRA cardiac cath site is without bleeding, swelling, redness or tenderness. Cardiac rehab is scheduled on 4/30/20. Patient advised to call clinic with any cardiac related questions or concerns prior to this harriet't. Patient verbalized understanding and agreed with plan. Will route this note to Dr. Drew to update her. CECILIO Peters RN.

## 2020-04-28 ENCOUNTER — TELEPHONE (OUTPATIENT)
Dept: CARDIOLOGY | Facility: CLINIC | Age: 60
End: 2020-04-28

## 2020-04-28 ENCOUNTER — VIRTUAL VISIT (OUTPATIENT)
Dept: CARDIOLOGY | Facility: CLINIC | Age: 60
End: 2020-04-28
Payer: COMMERCIAL

## 2020-04-28 VITALS — HEIGHT: 74 IN | WEIGHT: 235 LBS | BODY MASS INDEX: 30.16 KG/M2

## 2020-04-28 DIAGNOSIS — E78.5 HYPERLIPIDEMIA LDL GOAL <70: Primary | ICD-10-CM

## 2020-04-28 DIAGNOSIS — I20.0 UNSTABLE ANGINA (H): ICD-10-CM

## 2020-04-28 DIAGNOSIS — I25.10 CORONARY ARTERY DISEASE INVOLVING NATIVE CORONARY ARTERY OF NATIVE HEART WITHOUT ANGINA PECTORIS: ICD-10-CM

## 2020-04-28 LAB
INTERPRETATION ECG - MUSE: NORMAL
INTERPRETATION ECG - MUSE: NORMAL

## 2020-04-28 PROCEDURE — 99213 OFFICE O/P EST LOW 20 MIN: CPT | Mod: 95 | Performed by: NURSE PRACTITIONER

## 2020-04-28 RX ORDER — ISOSORBIDE MONONITRATE 30 MG/1
30 TABLET, EXTENDED RELEASE ORAL DAILY
Qty: 90 TABLET | Refills: 3 | Status: SHIPPED | OUTPATIENT
Start: 2020-04-28 | End: 2020-05-04

## 2020-04-28 RX ORDER — METOPROLOL SUCCINATE 50 MG/1
50 TABLET, EXTENDED RELEASE ORAL DAILY
Qty: 90 TABLET | Refills: 3 | Status: SHIPPED | OUTPATIENT
Start: 2020-04-28 | End: 2020-08-05

## 2020-04-28 RX ORDER — ATORVASTATIN CALCIUM 40 MG/1
40 TABLET, FILM COATED ORAL EVERY EVENING
Qty: 90 TABLET | Refills: 3 | Status: SHIPPED | OUTPATIENT
Start: 2020-04-28 | End: 2020-07-24

## 2020-04-28 ASSESSMENT — MIFFLIN-ST. JEOR: SCORE: 1945.7

## 2020-04-28 NOTE — TELEPHONE ENCOUNTER
Received VM from pt inquiring if he should take Imdur in the morning or at night, as he believed the instructions he got from Lolita today were to take it at night and the bottle said to take it in the morning. Reviewed with Lolita, who stated pt can take it in the morning. Called back and communicated recommendation. Pt stated he does have most of his chest pain symptoms at night and he would prefer to take it at night if that is OK. Reviewed if pt prefers he can try taking it at night, but he should take it consistently at the same time every day and should not take at night and then again in the morning.      Pt also wants to discuss with Lolita that at the ER on 4/25/20 he was given 3 baby aspirin on arrival, and he is concerned because there is a warning stating do not take more than 100 mg of aspirin per day while on Brilinta as it reduces the efficacy of Brilinta. Pt stated he took aspirin 81 mg prior to his arrival in the ER, and now he's worried that if he was given extra aspirin this could lead to a clot forming in his stent. Pt stated he told the nurse he is on Brilinta but was told that it was the protocol and that he was refusing care if he did not take the aspirin. Pt asked if this could affect his risk of clot formation and stated he would like to discuss it with Lolita if possible. Advised will update Lolita of his concerns.

## 2020-04-28 NOTE — PATIENT INSTRUCTIONS
Today's Plan:     1. START Imdur 30 mg daily with first dose tonight.     2. Check blood pressure at home, 1 hour after morning medications.     3. Cardiac Rehab on Thursday, 4/30.      If you have questions or concerns please call my nurse team at [phone].     Scheduling phone number: [phone]  Reminder: Please bring in all current medications, over the counter supplements and vitamin bottles to your next appointment.    It was a pleasure seeing you today!     Lolita Carreno, NAOMY  4/28/2020

## 2020-04-28 NOTE — PROGRESS NOTES
"Amado Veliz is a 60 year old male who is being evaluated via a billable telephone visit.      The patient has been notified of following:     \"This telephone visit will be conducted via a call between you and your physician/provider. We have found that certain health care needs can be provided without the need for a physical exam.  This service lets us provide the care you need with a short phone conversation.  If a prescription is necessary we can send it directly to your pharmacy.  If lab work is needed we can place an order for that and you can then stop by our lab to have the test done at a later time.    Telephone visits are billed at different rates depending on your insurance coverage. During this emergency period, for some insurers they may be billed the same as an in-person visit.  Please reach out to your insurance provider with any questions.    If during the course of the call the physician/provider feels a telephone visit is not appropriate, you will not be charged for this service.\"    Patient has given verbal consent for Telephone visit?  Yes    How would you like to obtain your AVS? Mail a copy    Phone call duration: 38 minutes    Lolita Carreno, CNP      Review Of Systems  Skin: NEGATIVE  Eyes:Ears/Nose/Throat: wears cheater glasses,, tinnitus  Respiratory: NEGATIVE  Cardiovascular:occ chest tightness/pressure  Gastrointestinal: diarrhea for the last day  Genitourinary:NEGATIVE   Musculoskeletal: NEGATIVE  Neurologic: NEGATIVE  Psychiatric: anxiety over this current state of health  Hematologic/Lymphatic/Immunologic: NEGATIVE  Endocrine:  NEGATIVE  Vitals today are:  Weight 235.0 lb  Patient states pulse drops to low-mid 50's when takes Metoprolol.  He just doesn't feel right.  Still having unprovoked chest pain.     MODE Weir      HPI:    Mr. Veliz is a pleasant 60 year-old gentleman with a history significant for coronary artery disease, status post stenting of the " proximal right coronary artery on 04/23/20, hyperlipidemia with LDL of 160, and 13-pack-year smoking history (quit in 1993).    He was recently hospitalized 04/22-04/24 with chest pain, negative troponin, negative stress test, but given ongoing symptoms, he underwent a coronary angiogram with Dr. Walker on 04/23/20. He was found to have moderate proximal right coronary artery stenosis, but there was a complex ulcerated plaque that had ruptured on OCT. Due to his symptoms at rest, he underwent successful PCI of the proximal RCA with one drug-eluting stent that was post-dilated with excellent result. He had mild non-obstructive disease noted elsewhere. His echocardiogram showed hyperdynamic systolic function with EF greater than 70% without any dynamic obstruction and no significant valve disease. Renal function was normal with creatinine of 0.72. He was started on dual-antiplatelet therapy with aspirin and Brilinta, Toprol XL 50 mg daily, and atorvastatin 40 mg daily.    Unfortunately, the patient was readmitted 04/25-04/26 with chest discomfort in what he described as intermittent upper abdomen/lower sternal area. This was not occurring at rest and not related or worsened by exertion. Notably, this pain was different from the left-sided chest discomfort that prompted his coronary angiogram. He reported never trying sublingual nitroglycerin. He serial troponins were negative. EKG showed normal sinus rhythm without any ischemic changes. He reported consistently taking his dual antiplatelet therapy with aspirin and Brilinta, BB, and statin. His blood pressure and heart rate were well-controlled during admission. Dr. Drew was consulted and felt that his pain was noncardiac in nature and that he could consider switching Brilinta to Plavix as outpatient, if symptoms continue.     I am seeing the patient today one week earlier than his scheduled hospital follow-up due to ongoing chest discomfort. Today the patient reports  "he \"just doesn't feel right and it needs to get fixed\" He does feel that there is an anxiety component related to his symptoms. It is troublesome to him that his initial testing during hospitalization was all negative yet findings from coronary angiogram revealed a significant lesion requiring intervention. He is worried that although his troponin and EKG were unremarkable during his most recent admission, there could still be a significant lesion that may cause him another heart attack. Notably, the chest discomfort he is describing continues to be different than the pain that prompted his coronary angiogram. He describes the pain as intermittent substernal chest pressure that occurs randomly and spontaneously goes away. The pain can last anywhere from 5 to 45 minutes and he rates the discomfort a 1 or 2 out of 10. He denies any radiating symptoms, shortness of breath, dizziness, diaphoresis, near syncopal or syncopal episodes, or palpitations. He also denies any daytime fatigue. He is taking all his medications as prescribed.     His blood pressures during recent admission were well-controlled, 130/70's with HR of 58. Electrolytes and renal function stable, no new labs since recent admission.     Assessment and Plan    I reassured the patient that this pain is likely noncardiac related. I agreed that this could potentially be related to the anxiety he has been experiencing since undergoing the coronary angiogram. We discussed the fact that no additional testing or evaluation was deemed necessary at this point. Although I do think it's reasonable to trial him on a long-acting nitrate such as Imdur in the case he is experiencing vasospasms. He denies increased fatigue so at this point I don't think his symptoms are a side effect of beta blocker therapy. At this point, I decided to keep him on Brilinta since he was not experiencing any shortness of breath, but it would be reasonable to transition him to Plavix in the " future if symptoms persist despite nitrate therapy.  I also recommended he follow-up with GI as recommended during his hospitalization with outpatient EGD given the dysphagia he's been experiencing. I also strongly encouraged he find a primary care provider for closer follow-up with his anxiety and other risk factors. He agreed to this plan.     1. Start Imdur 30 mg daily for potential vasospasms.   2. Continue uninterrupted dual antiplatelet therapy with aspirin 81 mg and Brilinta 90 mg BID for one year.   3. Continue Toprol XL 40 mg daily and atorvastatin 40 mg daily.   4. Establish care with primary care provider and recommend follow-up with outpatient EGD to evaluate dysphagia.   5. Follow-up with me in 2 weeks to re-evaluate symptoms. If symptoms persist, this could be a side effect of Brilinta and can consider switching to clopidogrel (Plavix) with loading dose of 600 mg.    It was my pleasure caring for Mr. Veliz today. I will see him in two weeks to re-evaluate his symptoms.     Lolita Carreno, CNP  Text Page:  [phone]  (7am - 6pm, M-F)

## 2020-04-28 NOTE — TELEPHONE ENCOUNTER
Pt was scheduled for a video visit today with Lolita Carreno, KEE-will close this encounter. CECILIO Peters RN.

## 2020-04-30 ENCOUNTER — TELEPHONE (OUTPATIENT)
Dept: CARDIOLOGY | Facility: CLINIC | Age: 60
End: 2020-04-30

## 2020-04-30 ENCOUNTER — TELEPHONE (OUTPATIENT)
Dept: INTERNAL MEDICINE | Facility: CLINIC | Age: 60
End: 2020-04-30

## 2020-04-30 ENCOUNTER — HOSPITAL ENCOUNTER (OUTPATIENT)
Dept: CARDIAC REHAB | Facility: CLINIC | Age: 60
End: 2020-04-30
Attending: INTERNAL MEDICINE
Payer: COMMERCIAL

## 2020-04-30 DIAGNOSIS — I25.10 CORONARY ARTERY DISEASE INVOLVING NATIVE CORONARY ARTERY OF NATIVE HEART WITHOUT ANGINA PECTORIS: ICD-10-CM

## 2020-04-30 DIAGNOSIS — I20.0 UNSTABLE ANGINA (H): ICD-10-CM

## 2020-04-30 PROCEDURE — 40000116 ZZH STATISTIC OP CR VISIT

## 2020-04-30 PROCEDURE — 40000575 ZZH STATISTIC OP CARDIAC VISIT #2

## 2020-04-30 PROCEDURE — 93798 PHYS/QHP OP CAR RHAB W/ECG: CPT

## 2020-04-30 PROCEDURE — 93797 PHYS/QHP OP CAR RHAB WO ECG: CPT

## 2020-04-30 NOTE — TELEPHONE ENCOUNTER
Patient informed and a video visit to establish care scheduled.  States he could not wait until July 2020 for face to face visit.

## 2020-04-30 NOTE — TELEPHONE ENCOUNTER
Pt called to update Lolita on his symptoms after starting Imdur on 4/28/20. Pt reported he is no longer having chest pain right around bed time when he is trying to fall asleep, however he is still having some episodes of chest pain during the day. Pt stated he's not sure if there is any pattern but he is keeping a log of his symptoms. Pt reported for example at 10:00 am yesterday he had an episode of pain in center of his chest that he rated 1-2/10 that lasted about 40 minutes. Pt stated his episodes have lasted from 5 minutes to an hour, and most of the episodes have occurred while pt was at rest, usually reading, watching TV or working on his computer. Pt reported he also awoke this morning at 2:55 am with pain on the left side toward the center of his chest, rated 1-2/10. Pt said he was dozing in and out so he's not sure how long it lasted. Pt asked if Lolita would like to call him to discuss and also asked when he should have another visit. Reviewed that Lolita had recommended follow up with her in 2 weeks at last visit, not yet scheduled. Will route to Lolita to review.

## 2020-04-30 NOTE — TELEPHONE ENCOUNTER
Pt calling requesting appointment to establish care with Dr. Lucas. He stated that Dr. Lucas is an old school friend. Pt recently had heart stent placed at [facility]. If pt cannot establish care with Dr. Lucas pt would like to know who he suggests he see. Pt can be reached at [phone]. OK to leave a detailed message. Please advise. Thanks.

## 2020-05-03 ENCOUNTER — HOSPITAL ENCOUNTER (EMERGENCY)
Facility: CLINIC | Age: 60
Discharge: HOME | End: 2020-05-03
Attending: EMERGENCY MEDICINE | Admitting: EMERGENCY MEDICINE
Payer: COMMERCIAL

## 2020-05-03 ENCOUNTER — APPOINTMENT (OUTPATIENT)
Dept: GENERAL RADIOLOGY | Facility: CLINIC | Age: 60
End: 2020-05-03
Attending: EMERGENCY MEDICINE
Payer: COMMERCIAL

## 2020-05-03 VITALS
OXYGEN SATURATION: 95 % | RESPIRATION RATE: 14 BRPM | HEART RATE: 56 BPM | SYSTOLIC BLOOD PRESSURE: 119 MMHG | TEMPERATURE: 97 F | DIASTOLIC BLOOD PRESSURE: 74 MMHG

## 2020-05-03 DIAGNOSIS — R07.89 CHEST DISCOMFORT: ICD-10-CM

## 2020-05-03 DIAGNOSIS — Z86.79 HISTORY OF CORONARY ARTERY DISEASE: ICD-10-CM

## 2020-05-03 LAB
ALBUMIN SERPL-MCNC: 3.9 G/DL (ref 3.4–5)
ALP SERPL-CCNC: 49 U/L (ref 40–150)
ALT SERPL W P-5'-P-CCNC: 37 U/L (ref 0–70)
ANION GAP SERPL CALCULATED.3IONS-SCNC: 7 MMOL/L (ref 3–14)
AST SERPL W P-5'-P-CCNC: 29 U/L (ref 0–45)
BASOPHILS # BLD AUTO: 0 10E9/L (ref 0–0.2)
BASOPHILS NFR BLD AUTO: 0.3 %
BILIRUB SERPL-MCNC: 0.5 MG/DL (ref 0.2–1.3)
BUN SERPL-MCNC: 12 MG/DL (ref 7–30)
CALCIUM SERPL-MCNC: 8.8 MG/DL (ref 8.5–10.1)
CHLORIDE SERPL-SCNC: 106 MMOL/L (ref 94–109)
CO2 SERPL-SCNC: 25 MMOL/L (ref 20–32)
CREAT SERPL-MCNC: 0.72 MG/DL (ref 0.66–1.25)
DIFFERENTIAL METHOD BLD: NORMAL
EOSINOPHIL # BLD AUTO: 0.2 10E9/L (ref 0–0.7)
EOSINOPHIL NFR BLD AUTO: 3.7 %
ERYTHROCYTE [DISTWIDTH] IN BLOOD BY AUTOMATED COUNT: 12.7 % (ref 10–15)
GFR SERPL CREATININE-BSD FRML MDRD: >90 ML/MIN/{1.73_M2}
GLUCOSE SERPL-MCNC: 111 MG/DL (ref 70–99)
HCT VFR BLD AUTO: 42.4 % (ref 40–53)
HGB BLD-MCNC: 14.9 G/DL (ref 13.3–17.7)
IMM GRANULOCYTES # BLD: 0 10E9/L (ref 0–0.4)
IMM GRANULOCYTES NFR BLD: 0.2 %
INTERPRETATION ECG - MUSE: NORMAL
INTERPRETATION ECG - MUSE: NORMAL
LIPASE SERPL-CCNC: 136 U/L (ref 73–393)
LYMPHOCYTES # BLD AUTO: 1.7 10E9/L (ref 0.8–5.3)
LYMPHOCYTES NFR BLD AUTO: 28.6 %
MCH RBC QN AUTO: 31 PG (ref 26.5–33)
MCHC RBC AUTO-ENTMCNC: 35.1 G/DL (ref 31.5–36.5)
MCV RBC AUTO: 88 FL (ref 78–100)
MONOCYTES # BLD AUTO: 0.5 10E9/L (ref 0–1.3)
MONOCYTES NFR BLD AUTO: 7.7 %
NEUTROPHILS # BLD AUTO: 3.6 10E9/L (ref 1.6–8.3)
NEUTROPHILS NFR BLD AUTO: 59.5 %
NRBC # BLD AUTO: 0 10*3/UL
NRBC BLD AUTO-RTO: 0 /100
PLATELET # BLD AUTO: 280 10E9/L (ref 150–450)
POTASSIUM SERPL-SCNC: 3.9 MMOL/L (ref 3.4–5.3)
PROT SERPL-MCNC: 7.5 G/DL (ref 6.8–8.8)
RBC # BLD AUTO: 4.8 10E12/L (ref 4.4–5.9)
SODIUM SERPL-SCNC: 138 MMOL/L (ref 133–144)
TROPONIN I SERPL-MCNC: <0.015 UG/L (ref 0–0.04)
TROPONIN I SERPL-MCNC: <0.015 UG/L (ref 0–0.04)
WBC # BLD AUTO: 6 10E9/L (ref 4–11)

## 2020-05-03 PROCEDURE — 99285 EMERGENCY DEPT VISIT HI MDM: CPT

## 2020-05-03 PROCEDURE — 93005 ELECTROCARDIOGRAM TRACING: CPT | Mod: 76

## 2020-05-03 PROCEDURE — 80053 COMPREHEN METABOLIC PANEL: CPT | Performed by: EMERGENCY MEDICINE

## 2020-05-03 PROCEDURE — 71046 X-RAY EXAM CHEST 2 VIEWS: CPT

## 2020-05-03 PROCEDURE — 85025 COMPLETE CBC W/AUTO DIFF WBC: CPT | Performed by: EMERGENCY MEDICINE

## 2020-05-03 PROCEDURE — 84484 ASSAY OF TROPONIN QUANT: CPT | Performed by: EMERGENCY MEDICINE

## 2020-05-03 PROCEDURE — 93005 ELECTROCARDIOGRAM TRACING: CPT

## 2020-05-03 PROCEDURE — 83690 ASSAY OF LIPASE: CPT | Performed by: EMERGENCY MEDICINE

## 2020-05-03 NOTE — ED AVS SNAPSHOT
Emergency Department  [address] [place] [id number]  Phone:  [phone]  Fax:  [phone]                                    Amado Veliz   MRN: [id number]    Department:   Emergency Department   Date of Visit:  5/3/2020           After Visit Summary Signature Page    I have received my discharge instructions, and my questions have been answered. I have discussed any challenges I see with this plan with the nurse or doctor.    ..........................................................................................................................................  Patient/Patient Representative Signature      ..........................................................................................................................................  Patient Representative Print Name and Relationship to Patient    ..................................................               ................................................  Date                                   Time    ..........................................................................................................................................  Reviewed by Signature/Title    ...................................................              ..............................................  Date                                               Time          22EPIC Rev 08/18

## 2020-05-04 RX ORDER — ISOSORBIDE MONONITRATE 60 MG/1
60 TABLET, EXTENDED RELEASE ORAL DAILY
Qty: 90 TABLET | Refills: 3 | Status: SHIPPED | OUTPATIENT
Start: 2020-05-04 | End: 2020-05-12

## 2020-05-04 NOTE — TELEPHONE ENCOUNTER
I will increase his Imdur to 60 mg daily to see if that helps with his symptoms. He can just take two of his 30 mg tablets until he sees Janene. I think it would also be reasonable to transition him from Brilinta to Plavix at his next follow-up appointment.     Thank you,     Lolita Carreno, CNP

## 2020-05-04 NOTE — ED PROVIDER NOTES
History     Chief Complaint:  Chest Pain      HPI   Amado Veliz is a 60 year old male who presents with chest pain.  He had a drug-eluting stent placed to his RCA on April 23 of this year, and was then admitted from April 25-26 for subsequent chest pain syndrome that was ultimately deemed noncardiac based on serial troponins and cardiology consultation as well as a negative d-dimer and other tests.  For the past few days he has had intermittent chest discomfort that is not exertional or specifically related to any positioning or eating.  No fevers or cough.  He does not feel short of breath.  No injuries.  No leg swelling.  He reports compliance with his Brilinta and nitroglycerin as well as Imdur and metoprolol.  He took 3 sublingual nitroglycerin prior to arrival with no change in his symptoms.  No vomiting.  He is set up to have an upper endoscopy in the near future to evaluate some longstanding swallowing troubles.  He was recently initiated on Protonix.    Allergies:  Niacin      Medications:    Aspirin   Lipitor   Imdur   Toprol XL   Nitrostat   Protonix   Brilinta     Past Medical History:    Coronary Artery Disease  Hyperlipidemia  Hemorrhage of rectum and anus  Unstable angina   Intestinal infection due to clostridium difficile   Pharyngoesophageal dysphagia    Past Surgical History:    L5-S1 disk herniation surgery  CV coronary angiogram  Intravascular US   PCI stent drug eluting    Family History:    Father: DVT, atrial fibrillation  Sister: DVT  Maternal grandmother: ovarian cancer  Maternal grandfather: Coronary Artery Disease     Social History:  The patient was unccompanied to the ED.  Smoking Status: Former Smoker  Smokeless Tobacco: Never Used  Alcohol Use: Negative   Drug Use: Negative  Marital Status:        Review of Systems   All other systems reviewed and are negative.    Physical Exam     Patient Vitals for the past 24 hrs:   BP Temp Temp src Pulse Heart Rate Resp SpO2    05/03/20 2135 -- 97  F (36.1  C) Temporal -- -- -- --   05/03/20 2040 119/74 -- -- 56 56 14 95 %   05/03/20 2000 (!) 159/91 -- -- -- 63 12 97 %      Physical Exam  General: nontoxic appearing male sitting upright in room 18  HENT: mucous membranes moist  CV: rate as above, regular rhythm, no lower extremity edema, no JVD, palpable symmetric radial pulses  Resp: clear throughout, normal effort, no crackles or wheezing  GI: abdomen soft and nontender, no guarding, negative Coyne's sign  MSK: no bony tenderness to chest  Skin: appropriately warm and dry, no erythema or vesicles to chest wall  Neuro: alert, clear speech, oriented   Psych: cooperative, anxious    Emergency Department Course     ECG:  ECG taken at 1934, ECG read at 2000  Normal sinus rhythm  Cannot rule out anterior infarct, old  Rate 61 bpm. NY interval 178 ms. QRS duration 100 ms. QT/QTc 398/400 ms. P-R-T axes 36 -1 33.    ECG:  ECG taken at 2206, ECG read at 2212  Sinus bradycardia   Rate 52 bpm. NY interval 204 ms. QRS duration 104 ms. QT/QTc 418/392 ms. P-R-T axes 23 4 35.     Imaging:  Radiology findings were communicated with the patient who voiced understanding of the findings.    XR Chest 2 Views  There are no acute infiltrates. The cardiac silhouette is  not enlarged. Pulmonary vasculature is unremarkable.  DAVIN SALCEDO MD  Reading per radiology     Laboratory:  Laboratory findings were communicated with the patient who voiced understanding of the findings.    CBC: WBC 6.0, HGB 14.9,   CMP: Glucose 111 (H), o/w WNL (Creatinine 0.72)    Lipase: 136    Troponin (Collected 1947): <0.015   Troponin (Collected 2145): <0.015     Emergency Department Course:    1934 EKG obtained as noted above.     1947 IV was inserted and blood was drawn for laboratory testing, results above.     1955 Nursing notes and vitals reviewed. I performed an exam of the patient as documented above.     I performed electronic chart review in [employer].  The patient was  placed on continuous cardiac and pulse ox monitoring.    2037 The patient was sent for a XR while in the emergency department, results above.      2105 I spoke with Dr. Gordon of the cardiology service from [employer] regarding patient's presentation, findings, and plan of care.     2145 Blood drawn. This was sent to the lab for further testing, results above.     2206 EKG obtained as noted above.      Prior to discharge, I personally reviewed the results with the patient and all related questions were answered. The patient verbalized understanding and is amenable to plan.     Impression & Plan      Medical Decision Making:  An acute coronary syndrome or other cardiac process or consequence of his recent catheterization and stent placement were certainly considered, though thought to be very unlikely in light of test results available tonight.  History and vital signs are not suggestive of pulmonary embolism, and he just recently had a negative d-dimer under essentially identical circumstances.  Spoke with the on-call cardiologist who did not feel that further intervention or testing was indicated based on negative troponins.  Alternate etiologies including pericarditis, effusion, pneumothorax, pneumonia, esophagitis, pleurisy, and many others were also considered.  The diagnostic uncertainty was really acknowledged to the patient, who is understandably frustrated that I do not have a specific answer as to the cause of his symptoms, though reassurance was provided based on test results here and he is comfortable going home and pursuing close outpatient follow-up.   His cardiology team did not think that switching to Plavix, from Brilinta, as had been considered in the recent past, was indicated at this time.  He was specifically asked to return here for worsening at any hour.    Diagnosis:    ICD-10-CM    1. Chest discomfort  R07.89    2. History of coronary artery disease  Z86.79      Disposition:   The patient is  discharged to home.     Scribe Disclosure:  I, Orla Severson, am serving as a scribe at 7:51 PM on 5/3/2020 to document services personally performed by Luis Menendez MD based on my observations and the provider's statements to me.    EMERGENCY DEPARTMENT    This record was created at least in part using electronic voice recognition software, so please excuse any typographical errors.        Luis Menendez MD  05/03/20 [id number]

## 2020-05-04 NOTE — TELEPHONE ENCOUNTER
Received response from Lolita:     Lolita Carreno, Angela Watson, RN    Caller: Unspecified (4 days ago,  8:24 AM)               He can try taking his Imdur in the morning to see if that helps with his symptoms during the day. He can go ahead and take the Imdur the following morning even if he took it the night prior. I will see how he does with this. We can always increase the dose to 60 mg daily, if need be. As far as seeing me in 2 weeks, scheduling should be calling him to set this up. I will reach out to scheduling to have them call.     Thank you!     Lolita      Called back and spoke with pt. Pt reported he actually took his Imdur this morning by accident, so he will continue taking it in the morning and see if this helps. Pt stated he went to the ER with chest pain last night but was sent home after his workup was negative. Notes in Epic, will update Lolita. Pt is scheduled for follow up on 5/12/20 with Janene Mckeon NP.

## 2020-05-05 NOTE — TELEPHONE ENCOUNTER
Attempted to call back to pt, no answer, unable to leave voicemail.     Addendum 5/6/20: Called back to pt, no answer. Left VM with recommendation from ONEAL Mchugh that pt may increase Imdur to 60 mg daily and left call back number for Team 1.     Addendum 5/6/20: Received return call from pt. Reviewed recommendation from ONEAL Mchugh that pt can increase Imdur to 60 mg daily. Pt stated he feels his symptoms have improved since he went to the ER on 5/3/20 and he would like to hold off on increasing the dose at this time Advised pt that is OK to wait and discuss at his visit if his symptoms have improved. Reviewed date and time of upcoming appointment.

## 2020-05-08 ENCOUNTER — VIRTUAL VISIT (OUTPATIENT)
Dept: INTERNAL MEDICINE | Facility: CLINIC | Age: 60
End: 2020-05-08
Payer: COMMERCIAL

## 2020-05-08 DIAGNOSIS — Z95.5 HISTORY OF CORONARY ANGIOPLASTY WITH INSERTION OF STENT: ICD-10-CM

## 2020-05-08 DIAGNOSIS — R07.9 CHEST PAIN, UNSPECIFIED TYPE: Primary | ICD-10-CM

## 2020-05-08 DIAGNOSIS — I25.110 CORONARY ARTERY DISEASE INVOLVING NATIVE CORONARY ARTERY OF NATIVE HEART WITH UNSTABLE ANGINA PECTORIS (H): ICD-10-CM

## 2020-05-08 DIAGNOSIS — E78.5 HYPERLIPIDEMIA LDL GOAL <70: ICD-10-CM

## 2020-05-08 PROCEDURE — 99204 OFFICE O/P NEW MOD 45 MIN: CPT | Mod: 95 | Performed by: INTERNAL MEDICINE

## 2020-05-08 NOTE — PROGRESS NOTES
"Amado Veliz is a 60 year old male who is being evaluated via a billable video visit.      The patient has been notified of following:     \"This video visit will be conducted via a call between you and your physician/provider. We have found that certain health care needs can be provided without the need for an in-person physical exam.  This service lets us provide the care you need with a video conversation.  If a prescription is necessary we can send it directly to your pharmacy.  If lab work is needed we can place an order for that and you can then stop by our lab to have the test done at a later time.    Video visits are billed at different rates depending on your insurance coverage.  Please reach out to your insurance provider with any questions.    If during the course of the call the physician/provider feels a video visit is not appropriate, you will not be charged for this service.\"    Patient has given verbal consent for Video visit? Yes    How would you like to obtain your AVS? Mail a copy    Patient would like the video invitation sent by: Text to cell phone: [phone]    Will anyone else be joining your video visit? No    Subjective     Amado Veliz is a 60 year old male who presents to clinic today for the following health issues:    HPI  New Patient/Transfer of Care     Video Start Time: 0957        Hyperlipidemia Follow-Up      Are you regularly taking any medication or supplement to lower your cholesterol?   Yes- Lipitor    Are you having muscle aches or other side effects that you think could be caused by your cholesterol lowering medication?  No    Hypertension Follow-up      Do you check your blood pressure regularly outside of the clinic? Yes     Are you following a low salt diet? Yes    Are your blood pressures ever more than 140 on the top number (systolic) OR more   than 90 on the bottom number (diastolic), for example 140/90? No    The patient initiates a visit to discuss " "recurrent chest discomfort in the context of known coronary artery disease and stent placement on April 22.    We reviewed the patient's history, basically 1 of being well for years, but developing exertional chest \"pressure\" that was first noted with walking up a hill and relieving with rest.  He presented to medical attention in April, and in spite of \"negative tests\" for cardiac injury cardiology consultation led to recommendation for angiography.    Findings were as follows:  Pre Procedure Diagnosis     Unstable angina     Post Procedure Diagnosis     Ruptured plaque within proximal RCA, s/p LUZ x 1       Conclusion     1.  Moderate proximal RCA stenosis by angiography, but with clear complex ruptured and ulcerated plaque on OCT.  He continues to have unstable and rest symptoms this week which may be related to recurrent thrombosis, spasm and/or embolization from continued ulceration.  Given his feel odd behavior we recommended proceeding with coronary stenting  2.  Successful PCI of the proximal RCA ruptured plaque with a 4.0 x 16 mm Synergy LUZ, postdilated to 4.7.  3.  Mild nonobstructive CAD elsewhere.       Plan     - Remove radial band per protocol; wrist precautions  - DAPT for 6 months.  After this, consider long-term P2Y12 inhibitor monotherapy if financially feasible.  - High-intensity statin  - Low-dose beta-blocker, titrate up as tolerated  - Smoking cessation  - Cardiac rehab referral    Coronary Findings     Diagnostic   Dominance: Right   Left Main    The vessel was visualized by selective angiography and is moderate in size. There was 0% vessel disease.    Left Anterior Descending    Mid LAD lesion is 20% stenosed.    Left Circumflex    The vessel was visualized by selective angiography and is moderate in size. There was 0% vessel disease.    Right Coronary Artery    The vessel was visualized by selective angiography and is large.    Prox RCA lesion is 50% stenosed. The lesion is ulcerative. " "Optical coherence tomography (OCT) was performed. Ulcerative plaque observed on OCT. Proximal reference diameter ~ 4.3, distal reference diameter ~ 4.75.    Mid RCA lesion is 5% stenosed.    Intervention     Prox RCA lesion    Stent    The pre-interventional distal flow is normal (BRITTANY 3). A stent was successfully placed. The post-interventional distal flow is normal (BRITTANY 3). Initial review of the angiogram showed only a 40 to 50% angiographic stenosis. However, the patient's symptoms were classic for unstable angina, and he had several risk factors. There is also an irregular appearance to the plaque concerning for ulceration. Accordingly, we performed OCT of the lesion which demonstrated a clear dissection flap and ulcerative plaque. We then proceeded to deploy a 4.0 x 16 mm Synergy LUZ, and postdilated this with a 4.5 mm NC balloon to high pressure. Final angiography revealed excellent stent expansion, BRITTANY-3 flow, and no evidence of complication.    There is a 0% residual stenosis post intervention.      Unfortunately, post stent placement he has been noting recurrent chest discomfort.  This discomfort is slightly different than his initial presenting discomfort, as it is somewhat more variable in location and in character.  The pain seems to come and go, but again seems more notable with activity than at rest.    Often the symptoms are of lesser severity and he described describes being \"aware of his breathing\" and having a \"heavy feeling\".  He uses descriptive terms  \"heaviness or dull ache\".    He has presented to the emergency department twice since the stent was placed.  Tests were again negative, and he was felt to have a noncardiac origin for his discomfort.    Quite understandably, he remains very apprehensive about this discomfort, particularly in view of negative tests prior to abnormal angiography.  He wonders whether he might benefit from repeat invasive or noninvasive study of his coronary anatomy. " " He also wonders whether Brilinta could be actually causing some side effects of chest discomfort or dyspnea.  He also wonders whether the mechanical procedure to the stent \"including the stretching\", could be causing some symptoms.    We discussed that the Brilinta could possibly though unlikely be causing some symptoms. He will require an anti-platelet Rx in any case, with Plavix being an alternative. Risks of stopping antiplatelet medication are too great.     We discussed that the body's nervous system is not well-enough calibrated to localize pain to the site of injury to the coronary arteries themselves.    The patient the patient's apprehension is acknowledged and validated as being reasonable.  We did discuss that he likely has, in his own words, \"dialed up\" a heightened awareness of real but physiologic body symptoms.     We discussed two goals:  Identifying the cause of his current symptoms, and   Getting some confidence that his coronary artery anatomy is stable and requires no acute interventions.     We discussed that the causes of symptoms include but are not limited to anxiety/apprehension with heightened awareness of physiologic symptoms, as well as musculoskeletal, GI, or other etiologies.     We discussed that Dr Lucas will contact cardiology in advance of his upcoming cardiology appt, scheduled for this Tuesday, to apprise them of his concerns. They will undoubtedly discuss with him the pros and cons of further studies of his coronary anatomy.     Past medical, family and social histories as well as medications reviewed and updated as needed.    Reviewed and updated as needed this visit by Provider         Review of Systems   REVIEW OF SYSTEMS: The following systems have been completely reviewed and are negative except as noted above:   Constitutional, HEENT, respiratory, cardiovascular, gastrointestinal, musculoskeletal, psychiatric, and neurologic systems.        Objective    There were no " "vitals taken for this visit.  Estimated body mass index is 30.17 kg/m  as calculated from the following:    Height as of 4/28/20: 1.88 m (6' 2\").    Weight as of 4/28/20: 106.6 kg (235 lb).  Physical Exam     GENERAL: Healthy, alert and no distress  EYES: Eyes grossly normal to inspection, conjunctivae and sclerae normal  RESP: no audible wheeze, cough, or visible cyanosis.  No visible retractions or increased work of breathing.  Able to speak fully in complete sentences.  NEURO: Cranial nerves grossly intact, mentation intact and speech normal  PSYCH: mentation appears normal, affect normal/bright, judgement and insight intact, normal speech and appearance well-groomed      Diagnostic Test Results:  Labs reviewed in Epic        Assessment & Plan     (R07.9) Chest pain, unspecified type  (primary encounter diagnosis)  Comment: Suspect that coronary artery anatomy is stable and that stent is patent. Impossible to dismiss patient's chest symptoms in context of his presentation and history.  See extensive discussion. Continue current meds.  Dr Lucas to discuss history with cardiology prior to upcoming visit.      (Z95.5) History of coronary angioplasty with insertion of stent  (I25.110) Coronary artery disease involving native coronary artery of native heart with unstable angina pectoris (H)  Comment: F/u with cardiology as planned.     (E78.5) Hyperlipidemia LDL goal <70  Comment: Continue statin therapy.        BMI:   Estimated body mass index is 30.17 kg/m  as calculated from the following:    Height as of 4/28/20: 1.88 m (6' 2\").    Weight as of 4/28/20: 106.6 kg (235 lb).           Advised office f/u in a few weeks,   No follow-ups on file.    Ildefonso Lucas MD,  [facility]      Video-Visit Details    Type of service:  Video Visit    Video End Time: 1034    Originating Location (pt. Location): Home    Distant Location (provider location):  [facility]     Platform used for Video " Visit: Doximity    No follow-ups on file.       Ildefonso Lucas MD,

## 2020-05-12 ENCOUNTER — VIRTUAL VISIT (OUTPATIENT)
Dept: CARDIOLOGY | Facility: CLINIC | Age: 60
End: 2020-05-12
Attending: NURSE PRACTITIONER
Payer: COMMERCIAL

## 2020-05-12 ENCOUNTER — NURSE TRIAGE (OUTPATIENT)
Dept: CARDIOLOGY | Facility: CLINIC | Age: 60
End: 2020-05-12

## 2020-05-12 VITALS
BODY MASS INDEX: 29.9 KG/M2 | WEIGHT: 233 LBS | HEIGHT: 74 IN | HEART RATE: 60 BPM | DIASTOLIC BLOOD PRESSURE: 60 MMHG | SYSTOLIC BLOOD PRESSURE: 99 MMHG

## 2020-05-12 DIAGNOSIS — I25.10 CORONARY ARTERY DISEASE INVOLVING NATIVE CORONARY ARTERY OF NATIVE HEART WITHOUT ANGINA PECTORIS: ICD-10-CM

## 2020-05-12 PROCEDURE — 99214 OFFICE O/P EST MOD 30 MIN: CPT | Mod: 95 | Performed by: NURSE PRACTITIONER

## 2020-05-12 RX ORDER — CLOPIDOGREL BISULFATE 75 MG/1
300 TABLET ORAL ONCE
Qty: 4 TABLET | Refills: 0 | Status: SHIPPED | OUTPATIENT
Start: 2020-05-12 | End: 2020-05-19

## 2020-05-12 RX ORDER — ISOSORBIDE MONONITRATE 30 MG/1
30 TABLET, EXTENDED RELEASE ORAL DAILY
Start: 2020-05-12 | End: 2020-05-19

## 2020-05-12 RX ORDER — CLOPIDOGREL BISULFATE 75 MG/1
75 TABLET ORAL DAILY
Qty: 90 TABLET | Refills: 3 | Status: SHIPPED | OUTPATIENT
Start: 2020-05-13 | End: 2021-03-26

## 2020-05-12 RX ORDER — CLOPIDOGREL BISULFATE 75 MG/1
75 TABLET ORAL DAILY
Qty: 90 TABLET | Refills: 3 | Status: SHIPPED | OUTPATIENT
Start: 2020-05-13 | End: 2020-05-12

## 2020-05-12 ASSESSMENT — MIFFLIN-ST. JEOR: SCORE: 1936.63

## 2020-05-12 NOTE — PROGRESS NOTES
CARDIOLOGY CLINIC VIDEO VISIT    Amado Veliz is a 60 year old male who is being evaluated via a billable video visit.      The patient has been notified of following:     This video visit will be conducted via a call between you and your physician/provider. We have found that certain health care needs can be provided without the need for an in-person physical exam.  This service lets us provide the care you need with a video conversation.  If a prescription is necessary we can send it directly to your pharmacy.  If lab work is needed we can place an order for that and you can then stop by our lab to have the test done at a later time.    Virtual visits are billed at different rates depending on your insurance coverage. During this emergency period, for some insurers they may be billed the same as an in-person visit.  Please reach out to your insurance provider with any questions.    If during the course of the call the physician/provider feels a video visit is not appropriate, you will not be charged for this service.      Physician has received verbal consent for a Video Visit from the patient? Yes    Patient would like the video invitation sent by: Text to cell phone: [phone]      I have reviewed and updated the patient's Past Medical History, Social History, Family History and Medication List.    MEDICATIONS:  Current Outpatient Medications   Medication Sig Dispense Refill     aspirin (ASA) 81 MG EC tablet Take 1 tablet (81 mg) by mouth daily 90 tablet 3     atorvastatin (LIPITOR) 40 MG tablet Take 1 tablet (40 mg) by mouth every evening 90 tablet 3     isosorbide mononitrate (IMDUR) 60 MG 24 hr tablet Take 1 tablet (60 mg) by mouth daily 90 tablet 3     metoprolol succinate ER (TOPROL-XL) 50 MG 24 hr tablet Take 1 tablet (50 mg) by mouth daily 90 tablet 3     nitroGLYcerin (NITROSTAT) 0.4 MG sublingual tablet For chest pain place 1 tablet under the tongue every 5 minutes for 3 doses. If symptoms  persist 5 minutes after 1st dose call 911. 30 tablet 0     pantoprazole (PROTONIX) 40 MG EC tablet Take 1 tablet (40 mg) by mouth every morning (before breakfast) 14 tablet 0     ticagrelor (BRILINTA) 90 MG tablet Take 1 tablet (90 mg) by mouth every 12 hours 180 tablet 3       ALLERGIES  Niacin    Review Of Systems:     Skin: negative  Eyes: negative  Ears/Nose/Throat: negative  Respiratory:SOB  Cardiovascular: chest pain  Gastrointestinal: nausea and constipation  Genitourinary: negative  Musculoskeletal: negative  Neurologic: headaches  Psychiatric: Headaches  Hematologic/Lymphatic/Immunologic: negative  Endocrine: negative      Self reported vitals:  Weight:233lb  BP: 99/60  HR :60    [facility]    Brief physical exam:  General: In no acute distress, upright and calm.  Eyes: No apparent redness or discharge.   Chest: No labored breathing, no cough during exam or audible wheezing.   Neuro: No obvious focal defects or tremors.   Psych: Alert and oriented. Does not appear anxious.     The rest of a comprehensive physical examination is deferred due to public health emergency video visit restrictions.     Primary cardiologist: Dr. Wayne      HPI:  Amado Veliz is a pleasant 60-year-old male who carries a past medical history significant for coronary artery disease status post drug-eluting stent 4.0 x 16 mm Synergy to the proximal RCA (4/2020), hyperlipidemia, and remote tobacco abuse.     He was last evaluated via virtual visit on 4/28/2020, in follow-up to his recent PCI as well as a second readmission for chest pain determined to be non-cardiac. Please refer to that visit for a more complete HPI.      During his last visit, he reported dull chest discomfort and shortness of breath and was started on low-dose isosorbide. Unfortunately, he has noticed no improvement in his symptoms and now presents with headache.      Blood pressure is mildly soft at 99/60 with heart rate 60, managed on metoprolol.    Last BMP showed a sodium of 138, potassium 3.9, BUN 12, creatinine 0.72, GFR greater than 90  Hemoglobin 14.9 and platelet 280, denies any evidence of bleeding on dual antiplatelet therapy    Last lipid panel showed a total cholesterol of 231, HDL 36, , triglycerides 186, initiated on atorvastatin.    Activity level is unchanged  Compliant with all medications.    Assessment/Plan:    1. Chest pain/CAD -dull chest pain unrelieved with nitroglycerin.  Recent PCI to the proximal RCA using a 4.0 x 16 mm Synergy drug-eluting stent.  Symptoms of dull chest pain and shortness of breath since PCI, likely related to Brilinta.  I will have him discontinue Brilinta and start loading dose of Plavix tonight 300 mg followed by 75 mg daily.  Monitor for any worsening symptoms such as chest pain, shortness of breath, palpitations, lightheadedness or dizziness.  Due to significant headache and soft blood pressures, recommend he decrease isosorbide to 30 mg daily and monitor for headache and hypotension.  He will continue dual antiplatelet therapy x12 months uninterrupted, metoprolol, and statin.  Encourage exercise, weight loss, and heart healthy diet.    2.  Hyperlipidemia -LDL not at goal, initiated on statin at time of discharge.  Follow-up fasting lipid panel in 8 weeks      Follow up plan: -Follow-up in 2 weeks with KEE for reassessment of symptoms          Video-Visit Details    Type of service:  Video Visit  Video time - 25 min  Originating Location (pt. Location): Home    Distant Location (provider location):  [facility] Home Office  Platform used for Video Visit: GLORIA Dior, CNP  Pager [phone]  5/12/2020

## 2020-05-12 NOTE — TELEPHONE ENCOUNTER
"Per Janene Mckeon, NP, \"He will continue dual antiplatelet therapy x12 months uninterrupted, metoprolol, and statin.\"     Attempted to call pt, left message on secure voicemail that he should continue Plavix & low dose ASA for 12 month uninterrupted for 12 months. Pt advised to call with further questions or concerns. Elia VANG   "

## 2020-05-12 NOTE — TELEPHONE ENCOUNTER
Patient called stating he had a virtual visit with Janene Mckeon CNP, today and is being switched from brilinta to clopidogrel. Patient says he forgot to ask Janene if he should continue taking his low dose aspirin. In reviewing the progress note from today's visit, the low dose aspirin is not mentioned. Patient is requesting a return call today for clarification. RN advised a message will be sent to the team nurse and will get a follow up call. Patient agreeable. Routing to team 5.

## 2020-05-12 NOTE — PATIENT INSTRUCTIONS
Thanks for participating in a video visit with the [facility] clinic today.  Dull chest pain and shortness of breath since recent stent procedure  Headaches after higher dose isosorbide  Recommend switching Brilinta to Plavix due to side effects.  300 mg Plavix starting tonight x1 dose only, then 75 mg daily  Decrease isosorbide to 30 mg daily  Switch Toprol to the evening  Switch atorvastatin to bedtime  Monitor blood pressures daily with a goal of less than 130/90  Continue to monitor for any worsening chest pain, shortness of breath, palpitations, lightheadedness, or dizziness.    Follow up in 2 weeks with KEE.    Please call my nurse at [phone] with any questions or concerns.    Scheduling phone number: [phone]  Reminder: Please bring in all current medications, over the counter supplements and vitamin bottles to your next appointment.

## 2020-05-15 ENCOUNTER — TELEPHONE (OUTPATIENT)
Dept: CARDIOLOGY | Facility: CLINIC | Age: 60
End: 2020-05-15

## 2020-05-15 NOTE — TELEPHONE ENCOUNTER
Patient started Plavix loading dose 300mg on Tues along with metoprolol and every night he gets a flush red face lasting about an hour.  He had to stop Brilinta due to chest pain and increased shortness of breath.    He has also noticed his BP is higher in the evenings since starting Plavix.  130/76  Usually 99/66 pulse about the same 54-66 range.    Patient has had problems with face flushing with Niacin and is wondering if he should continue the Plavix and tolerate the flushing?  He does not want to try another alternative to Plavix if possible.  Will forward to Dr. Wayne to review and advise Janene Mckeon is out ill.

## 2020-05-16 DIAGNOSIS — I25.10 CORONARY ARTERY DISEASE INVOLVING NATIVE CORONARY ARTERY OF NATIVE HEART WITHOUT ANGINA PECTORIS: Primary | ICD-10-CM

## 2020-05-16 RX ORDER — AMLODIPINE BESYLATE 2.5 MG/1
2.5 TABLET ORAL DAILY
Qty: 90 TABLET | Refills: 1 | Status: SHIPPED | OUTPATIENT
Start: 2020-05-16 | End: 2020-05-19 | Stop reason: ALTCHOICE

## 2020-05-18 NOTE — TELEPHONE ENCOUNTER
Abelino Wayne MD Tinglov, Karin, RN; LUCIO [facility] Heart Team 3; Janene Mckeon, GLORIA CNP    Caller: Unspecified (2 days ago, 11:24 AM)               I reached out to him. Told him to start taking plavix in the morning. Sent a prescription for Norvasc to his pharmacy. He still has angina and Imdur was not helpful. He has an appt with janene on 6/3. If he still has CP, Norvasc can be up titrated and stress test should be ordered. He may have microvascular disease.     Abelino

## 2020-05-19 ENCOUNTER — VIRTUAL VISIT (OUTPATIENT)
Dept: CARDIOLOGY | Facility: CLINIC | Age: 60
End: 2020-05-19
Payer: COMMERCIAL

## 2020-05-19 VITALS
BODY MASS INDEX: 29.53 KG/M2 | HEART RATE: 58 BPM | WEIGHT: 230 LBS | SYSTOLIC BLOOD PRESSURE: 146 MMHG | DIASTOLIC BLOOD PRESSURE: 78 MMHG

## 2020-05-19 DIAGNOSIS — I25.10 CORONARY ARTERY DISEASE INVOLVING NATIVE CORONARY ARTERY OF NATIVE HEART WITHOUT ANGINA PECTORIS: ICD-10-CM

## 2020-05-19 PROCEDURE — 99443: CPT | Performed by: INTERNAL MEDICINE

## 2020-05-19 RX ORDER — ISOSORBIDE MONONITRATE 60 MG/1
60 TABLET, EXTENDED RELEASE ORAL DAILY
Qty: 90 TABLET | Refills: 1 | Status: SHIPPED | OUTPATIENT
Start: 2020-05-19 | End: 2020-05-26

## 2020-05-19 NOTE — PROGRESS NOTES
"Amado Veliz is a 60 year old male who is being evaluated via a billable telephone visit.      The patient has been notified of following:     \"This telephone visit will be conducted via a call between you and your physician/provider. We have found that certain health care needs can be provided without the need for a physical exam.  This service lets us provide the care you need with a short phone conversation.  If a prescription is necessary we can send it directly to your pharmacy.  If lab work is needed we can place an order for that and you can then stop by our lab to have the test done at a later time.    Telephone visits are billed at different rates depending on your insurance coverage. During this emergency period, for some insurers they may be billed the same as an in-person visit.  Please reach out to your insurance provider with any questions.    If during the course of the call the physician/provider feels a telephone visit is not appropriate, you will not be charged for this service.\"    Patient has given verbal consent for Telephone visit?  Yes    What phone number would you like to be contacted at? [phone]    How would you like to obtain your AVS? MyChart  9:10 AM 05/19/20 NAHEED Jackson CMA    Phone call duration: 30 minutes    This clinic visit was performed via telephone/video due to COVID-[employer] restrictions.    Today, I had the pleasure of connecting with Amado Veliz for a follow-up visit.  He is a pleasant 60 year old patient with a past medical history of hypertension, lipidemia, PCI of proximal RCA with a 4.0×16 mm stent on 04/23/2020, 13 pack years of smoking [quit in 1993] whom I am seeing urgently.    Since his coronary angiogram in April of this year the patient has presented multiple times to the hospital and has called our office several times complaining atypical symptoms which he describes as fullness in the epigastric region and variable discomfort in his belly and " "his chest.  He was seen in the hospital.  Was ruled out for ACS and was provided reassurance by Dr. Drew to see him in consult service.  He then called our office a few days ago reporting similar symptoms and blamed it on Plavix.  Of note, he was previously on Brilinta and was switched to Plavix due to the above symptoms.  He continues to report flushing after taking Plavix which she says is tolerable.  I told him to take Plavix in the morning, start 20 mg of amlodipine and continue metoprolol, aspirin and statin unchanged.  He has been tried on Imdur in the past and reported no benefit in \" chest pain\" symptoms from it and hence stopped taking it.    He comes in today to report another episode of chest pain.  He took amlodipine for the first time in the morning and had on and off chest discomfort throughout the day which was nonexertional, left-sided, short-lived.  However, in the evening he was having 1 episode of severe chest tightness while he was resting.  This episode lasted for 15 to 20 minutes and he was contemplating going to the emergency room.  However, the episode subsided on its own.  He thinks it might have been because of starting amlodipine.  Would like to know what to do next.  He says he feels terrible and is nonfunctional and cannot continue to feel this way.  He has an KEE appointment on 6/3 but tells me that he does not feel he will last that long.  He is requesting that something be done on an urgent basis.  He also believes he has not given Imdur enough time to act and would like to give it another try.    Assessment:  1.  Single-vessel CAD status post PCI of proximal RCA with a 4 mm x 16 mm Synergy LUZ  2.  Ongoing symptoms including atypical chest/gastric pain, flushing- unlikely anginal vs anxiety?  3.  Hypertension.   4.  Hyperlipidemia    The patient is definitely very anxious and believes he still has obstructive coronary disease which is causing his symptoms.  He has been tried on Imdur " as well as sublingual nitroglycerin none of which helped his symptoms which makes me believe that his symptoms are non-cardiac.  Initially, ee believed that the symptoms might have been related to Brilinta and hence he was switched to Plavix but he continues to have symptoms even on Plavix.  I do not believe the episode he had yesterday was related to amlodipine as he reported multiple similar episodes over the last several weeks.  I will discontinue amlodipine, restart Imdur at 60 mg and schedule stress test.  He would like to try to exercise and hence I will order an exercise nuclear stress test.  This can be changed to Lexiscan if he is unable to exercise.    Plan:   1.  Stop Norvasc.  Start Imdur 60 mg p.o. daily  2.  Continue aspirin and Plavix unchanged.  Switch Plavix to p.m. if that helps with flushing  3.  Exercise nuclear stress test which can be changed to Lexiscan if he is unable to exercise.  4.  Keep your appointment on 6/3.  If the stress test is abnormal we will schedule a coronary angiogram prior to that visit.    Thank you for allowing me to participate in the care of Amado FORBES Denaekrishna    This note was completed in part using Dragon voice recognition software. Although reviewed after completion, some word and grammatical errors may occur.    Abelino Wayne MD  Cardiology    Orders Placed This Encounter   Medications     isosorbide mononitrate (IMDUR) 60 MG 24 hr tablet     Sig: Take 1 tablet (60 mg) by mouth daily     Dispense:  90 tablet     Refill:  1       Medications Discontinued During This Encounter   Medication Reason     clopidogrel (PLAVIX) 75 MG tablet      amLODIPine (NORVASC) 2.5 MG tablet Alternate therapy     isosorbide mononitrate (IMDUR) 30 MG 24 hr tablet        Encounter Diagnosis   Name Primary?     Coronary artery disease involving native coronary artery of native heart without angina pectoris        CURRENT MEDICATIONS:  Current Outpatient Medications   Medication Sig Dispense  Refill     aspirin (ASA) 81 MG EC tablet Take 1 tablet (81 mg) by mouth daily 90 tablet 3     atorvastatin (LIPITOR) 40 MG tablet Take 1 tablet (40 mg) by mouth every evening 90 tablet 3     clopidogrel (PLAVIX) 75 MG tablet Take 1 tablet (75 mg) by mouth daily 90 tablet 3     isosorbide mononitrate (IMDUR) 60 MG 24 hr tablet Take 1 tablet (60 mg) by mouth daily 90 tablet 1     metoprolol succinate ER (TOPROL-XL) 50 MG 24 hr tablet Take 1 tablet (50 mg) by mouth daily 90 tablet 3     nitroGLYcerin (NITROSTAT) 0.4 MG sublingual tablet For chest pain place 1 tablet under the tongue every 5 minutes for 3 doses. If symptoms persist 5 minutes after 1st dose call 911. 30 tablet 0     pantoprazole (PROTONIX) 40 MG EC tablet Take 1 tablet (40 mg) by mouth every morning (before breakfast) 14 tablet 0       ALLERGIES     Allergies   Allergen Reactions     Niacin      flushing       PAST MEDICAL HISTORY:  Past Medical History:   Diagnosis Date     CAD (coronary artery disease)     Status post stenting of proximal right coronary artery complex ruptured plaque on 23 April 2020.     Hyperlipidemia LDL goal <70      Intestinal infection due to Clostridium difficile      Pharyngoesophageal dysphagia        PAST SURGICAL HISTORY:  Past Surgical History:   Procedure Laterality Date     BACK SURGERY      L5-S1 disk herniation      CV CORONARY ANGIOGRAM N/A 4/23/2020    Procedure: Coronary Angiogram;  Surgeon: Derek Walker MD;  Location: [facility] CARDIAC CATH LAB     CV INTRAVASULAR ULTRASOUND N/A 4/23/2020    Procedure: Intravascular Ultrasound;  Surgeon: Derek Walker MD;  Location: [facility] CARDIAC CATH LAB     CV PCI STENT DRUG ELUTING       CV PCI STENT DRUG ELUTING N/A 4/23/2020    Procedure: Percutaneous Coronary Intervention Stent Drug Eluting;  Surgeon: Derek Walker MD;  Location:  [facility] C[facility]       FAMILY HISTORY:  Family History   Problem Relation Age of Onset     Family History  Negative Father      Deep Vein Thrombosis Father      Atrial fibrillation Father      Family History Negative Mother      Family History Negative Brother      Deep Vein Thrombosis Sister      Ovarian Cancer Maternal Grandmother      Coronary Artery Disease Maternal Grandfather        SOCIAL HISTORY:  Social History     Socioeconomic History     Marital status:      Spouse name: None     Number of children: 0     Years of education: None     Highest education level: None   Occupational History     Occupation:    Social Needs     Financial resource strain: None     Food insecurity     Worry: None     Inability: None     Transportation needs     Medical: None     Non-medical: None   Tobacco Use     Smoking status: Former Smoker     Packs/day: 1.00     Years: 15.00     Pack years: 15.00     Types: Cigarettes     Start date:      Last attempt to quit: 3/17/1993     Years since quittin.1     Smokeless tobacco: Never Used   Substance and Sexual Activity     Alcohol use: No     Drug use: No     Sexual activity: Yes   Lifestyle     Physical activity     Days per week: None     Minutes per session: None     Stress: None   Relationships     Social connections     Talks on phone: None     Gets together: None     Attends Baptism service: None     Active member of club or organization: None     Attends meetings of clubs or organizations: None     Relationship status: None     Intimate partner violence     Fear of current or ex partner: None     Emotionally abused: None     Physically abused: None     Forced sexual activity: None   Other Topics Concern      Service Not Asked     Blood Transfusions Not Asked     Caffeine Concern Not Asked     Occupational Exposure Not Asked     Hobby Hazards Not Asked     Sleep Concern Not Asked     Stress Concern Not Asked     Weight Concern Not Asked     Special Diet Not Asked     Back Care Not Asked     Exercise Not Asked     Bike Helmet Not Asked      Seat Belt Yes     Self-Exams Not Asked     Parent/sibling w/ CABG, MI or angioplasty before 65F 55M? Not Asked   Social History Narrative    SD CHECKED REGULARLY.    GUNS UNLOADED IN CLOSET AT HOME.     Time: 35 minutes    Abelino Wayne MD

## 2020-05-19 NOTE — LETTER
"5/19/2020      RE: Amado Veliz  [address] [place] [address]       Dear Colleague,    Thank you for the opportunity to participate in the care of your patient, Amado Veliz, at the [facility] at [facility]. Please see a copy of my visit note below.    Today, I had the pleasure of connecting with Amado Veliz for a follow-up visit.  He is a pleasant 60 year old patient with a past medical history of hypertension, lipidemia, PCI of proximal RCA with a 4.0×16 mm stent on 04/23/2020, 13 pack years of smoking [quit in 1993] whom I am seeing urgently.    Since his coronary angiogram in April of this year the patient has presented multiple times to the hospital and has called our office several times complaining atypical symptoms which he describes as fullness in the epigastric region and variable discomfort in his belly and his chest.  He was seen in the hospital.  Was ruled out for ACS and was provided reassurance by Dr. Drew to see him in consult service.  He then called our office a few days ago reporting similar symptoms and blamed it on Plavix.  Of note, he was previously on Brilinta and was switched to Plavix due to the above symptoms.  He continues to report flushing after taking Plavix which she says is tolerable.  I told him to take Plavix in the morning, start 20 mg of amlodipine and continue metoprolol, aspirin and statin unchanged.  He has been tried on Imdur in the past and reported no benefit in \" chest pain\" symptoms from it and hence stopped taking it.    He comes in today to report another episode of chest pain.  He took amlodipine for the first time in the morning and had on and off chest discomfort throughout the day which was nonexertional, left-sided, short-lived.  However, in the evening he was having 1 episode of severe chest tightness while he was resting.  This episode lasted for 15 to 20 " minutes and he was contemplating going to the emergency room.  However, the episode subsided on its own.  He thinks it might have been because of starting amlodipine.  Would like to know what to do next.  He says he feels terrible and is nonfunctional and cannot continue to feel this way.  He has an KEE appointment on 6/3 but tells me that he does not feel he will last that long.  He is requesting that something be done on an urgent basis.  He also believes he has not given Imdur enough time to act and would like to give it another try.    Assessment:  1.  Single-vessel CAD status post PCI of proximal RCA with a 4 m x 16 mm Synergy LUZ  2.  Ongoing symptoms including atypical chest/gastric pain, flushing- unlikely anginal vs anxiety?  3.  Hypertension.   4.  Hyperlipidemia    The patient is definitely very anxious and believes he has resolved coronary disease which is causing his symptoms.  He has been tried on Imdur as well as sublingual nitroglycerin none of which helped his symptoms.  That makes me believe that his symptoms are non-cardiac related.  Initially, he believed that the symptoms might have been related to Brilinta and hence he was switched to Plavix but he continues to have symptoms even on Plavix.  I do not believe the episode he had yesterday was related to amlodipine as he reported multiple similar episodes over the last several weeks.  I will discontinue amlodipine  , restart Imdur 60 mg and schedule stress test.  He would like to try to exercise and hence I will order an exercise nuclear stress test.  This can be changed to Lexiscan if he is unable to exercise.    Plan:   1.  Stop Norvasc.  Start Imdur 60 mg p.o. daily  2.  Continue aspirin and Plavix unchanged.  Switch Plavix to p.m. if that helps with flushing  3.  Exercise nuclear stress test which can be changed to Lexiscan if he is unable to exercise.  4.  Keep your appointment on 6/3.  If the stress test is abnormal we will schedule a  coronary angiogram prior to that visit.    Thank you for allowing me to participate in the care of Amado Veliz    This note was completed in part using Dragon voice recognition software. Although reviewed after completion, some word and grammatical errors may occur.    Abelino Wayne MD  Cardiology    Orders Placed This Encounter   Medications     isosorbide mononitrate (IMDUR) 60 MG 24 hr tablet     Sig: Take 1 tablet (60 mg) by mouth daily     Dispense:  90 tablet     Refill:  1       Medications Discontinued During This Encounter   Medication Reason     clopidogrel (PLAVIX) 75 MG tablet      amLODIPine (NORVASC) 2.5 MG tablet Alternate therapy     isosorbide mononitrate (IMDUR) 30 MG 24 hr tablet        Encounter Diagnosis   Name Primary?     Coronary artery disease involving native coronary artery of native heart without angina pectoris        CURRENT MEDICATIONS:  Current Outpatient Medications   Medication Sig Dispense Refill     aspirin (ASA) 81 MG EC tablet Take 1 tablet (81 mg) by mouth daily 90 tablet 3     atorvastatin (LIPITOR) 40 MG tablet Take 1 tablet (40 mg) by mouth every evening 90 tablet 3     clopidogrel (PLAVIX) 75 MG tablet Take 1 tablet (75 mg) by mouth daily 90 tablet 3     isosorbide mononitrate (IMDUR) 60 MG 24 hr tablet Take 1 tablet (60 mg) by mouth daily 90 tablet 1     metoprolol succinate ER (TOPROL-XL) 50 MG 24 hr tablet Take 1 tablet (50 mg) by mouth daily 90 tablet 3     nitroGLYcerin (NITROSTAT) 0.4 MG sublingual tablet For chest pain place 1 tablet under the tongue every 5 minutes for 3 doses. If symptoms persist 5 minutes after 1st dose call 911. 30 tablet 0     pantoprazole (PROTONIX) 40 MG EC tablet Take 1 tablet (40 mg) by mouth every morning (before breakfast) 14 tablet 0       ALLERGIES     Allergies   Allergen Reactions     Niacin      flushing       PAST MEDICAL HISTORY:  Past Medical History:   Diagnosis Date     CAD (coronary artery disease)     Status post  stenting of proximal right coronary artery complex ruptured plaque on 2020.     Hyperlipidemia LDL goal <70      Intestinal infection due to Clostridium difficile      Pharyngoesophageal dysphagia        PAST SURGICAL HISTORY:  Past Surgical History:   Procedure Laterality Date     BACK SURGERY      L5-S1 disk herniation      CV CORONARY ANGIOGRAM N/A 2020    Procedure: Coronary Angiogram;  Surgeon: Derek Walker MD;  Location:  [facility]     CV INTRAVASULAR ULTRASOUND N/A 2020    Procedure: Intravascular Ultrasound;  Surgeon: Derek Walker MD;  Location:  [facility]     CV PCI STENT DRUG ELUTING       CV PCI STENT DRUG ELUTING N/A 2020    Procedure: Percutaneous Coronary Intervention Stent Drug Eluting;  Surgeon: Derek Walker MD;  Location:  [facility]ARD[facility] C[facility] LAB       FAMILY HISTORY:  Family History   Problem Relation Age of Onset     Family History Negative Father      Deep Vein Thrombosis Father      Atrial fibrillation Father      Family History Negative Mother      Family History Negative Brother      Deep Vein Thrombosis Sister      Ovarian Cancer Maternal Grandmother      Coronary Artery Disease Maternal Grandfather        SOCIAL HISTORY:  Social History     Socioeconomic History     Marital status:      Spouse name: None     Number of children: 0     Years of education: None     Highest education level: None   Occupational History     Occupation:    Social Needs     Financial resource strain: None     Food insecurity     Worry: None     Inability: None     Transportation needs     Medical: None     Non-medical: None   Tobacco Use     Smoking status: Former Smoker     Packs/day: 1.00     Years: 15.00     Pack years: 15.00     Types: Cigarettes     Start date:      Last attempt to quit: 3/17/1993     Years since quittin.1     Smokeless tobacco: Never Used   Substance and Sexual Activity      Alcohol use: No     Drug use: No     Sexual activity: Yes   Lifestyle     Physical activity     Days per week: None     Minutes per session: None     Stress: None   Relationships     Social connections     Talks on phone: None     Gets together: None     Attends Religion service: None     Active member of club or organization: None     Attends meetings of clubs or organizations: None     Relationship status: None     Intimate partner violence     Fear of current or ex partner: None     Emotionally abused: None     Physically abused: None     Forced sexual activity: None   Other Topics Concern      Service Not Asked     Blood Transfusions Not Asked     Caffeine Concern Not Asked     Occupational Exposure Not Asked     Hobby Hazards Not Asked     Sleep Concern Not Asked     Stress Concern Not Asked     Weight Concern Not Asked     Special Diet Not Asked     Back Care Not Asked     Exercise Not Asked     Bike Helmet Not Asked     Seat Belt Yes     Self-Exams Not Asked     Parent/sibling w/ CABG, MI or angioplasty before 65F 55M? Not Asked   Social History Narrative    SD CHECKED REGULARLY.    GUNS UNLOADED IN CLOSET AT HOME.     Please do not hesitate to contact me if you have any questions/concerns.     Sincerely,     Abelino Wayne MD

## 2020-05-20 ENCOUNTER — TELEPHONE (OUTPATIENT)
Dept: CARDIOLOGY | Facility: CLINIC | Age: 60
End: 2020-05-20

## 2020-05-20 ENCOUNTER — VIRTUAL VISIT (OUTPATIENT)
Dept: INTERNAL MEDICINE | Facility: CLINIC | Age: 60
End: 2020-05-20
Payer: COMMERCIAL

## 2020-05-20 VITALS — HEART RATE: 59 BPM | SYSTOLIC BLOOD PRESSURE: 122 MMHG | DIASTOLIC BLOOD PRESSURE: 64 MMHG

## 2020-05-20 DIAGNOSIS — R06.09 DYSPNEA ON EXERTION: ICD-10-CM

## 2020-05-20 DIAGNOSIS — R13.10 DYSPHAGIA, UNSPECIFIED TYPE: ICD-10-CM

## 2020-05-20 DIAGNOSIS — R07.9 CHEST PAIN, UNSPECIFIED TYPE: Primary | ICD-10-CM

## 2020-05-20 PROCEDURE — 99214 OFFICE O/P EST MOD 30 MIN: CPT | Mod: 95 | Performed by: INTERNAL MEDICINE

## 2020-05-20 RX ORDER — TICAGRELOR 90 MG/1
TABLET ORAL
COMMUNITY
Start: 2020-04-28 | End: 2020-05-20

## 2020-05-20 RX ORDER — AMLODIPINE BESYLATE 2.5 MG/1
TABLET ORAL
COMMUNITY
Start: 2020-05-17 | End: 2020-05-20

## 2020-05-20 NOTE — TELEPHONE ENCOUNTER
PATIENT WELLNESS TELEPHONE SCREENING     Step 1: Answer all screening questions.     1. In the past 3 weeks, have you been exposed to someone with a known positive illness below?  COVID-19 (known or suspected): No  Chickenpox: No   Measles: No  Pertussis: No    2. Do you have any of the following new symptoms or symptoms that have started within the past 14 days?   Fever (subjective or >100.0)?: No   A new cough: No   Shortness of breath: No   Chills?No   New loss of taste or smell?No   Generalized body aches?No   New persistent headache?No   New sore throat?No   Nausea, vomiting or diarrhea: No    Step 2: If the patient is positive for symptoms, consult the ordering provider/consult IP to determine if the procedure is deemed necessary and inform the patient you will call them back.     Step 3 . If no symptoms, patient informed of the no visitor policy in place. Yes    Step 4. If positive new symptoms, and the procedure is deemed necessary. Notify your manager/supervisor. The patient must be informed to call the procedural department.  A team member with the appropriate PPE will bring the mask to the patient at door 2. The patient will be brought to the procedural department and registered over the phone.

## 2020-05-20 NOTE — PROGRESS NOTES
"[id number]    Amado Veliz is a 60 year old male who is being evaluated via a billable video visit.      The patient has been notified of following:     \"This video visit will be conducted via a call between you and your physician/provider. We have found that certain health care needs can be provided without the need for an in-person physical exam.  This service lets us provide the care you need with a video conversation.  If a prescription is necessary we can send it directly to your pharmacy.  If lab work is needed we can place an order for that and you can then stop by our lab to have the test done at a later time.    Video visits are billed at different rates depending on your insurance coverage.  Please reach out to your insurance provider with any questions.    If during the course of the call the physician/provider feels a video visit is not appropriate, you will not be charged for this service.\"    Patient has given verbal consent for Video visit? Yes    How would you like to obtain your AVS? Mail a copy    Patient would like the video invitation sent by: Text to cell phone: [phone]    Will anyone else be joining your video visit? No      Subjective     Amado Veliz is a 60 year old male who presents today via video visit for the following health issues:    HPI  Hyperlipidemia Follow-Up      Are you regularly taking any medication or supplement to lower your cholesterol?   Yes- Lipitor    Are you having muscle aches or other side effects that you think could be caused by your cholesterol lowering medication?  No    Hypertension Follow-up      Do you check your blood pressure regularly outside of the clinic? Yes     Are you following a low salt diet? Yes    Are your blood pressures ever more than 140 on the top number (systolic) OR more   than 90 on the bottom number (diastolic), for example 140/90? No      How many servings of fruits and vegetables do you eat daily?  4 or more    On average, " "how many sweetened beverages do you drink each day (Examples: soda, juice, sweet tea, etc.  Do NOT count diet or artificially sweetened beverages)?   0    How many days per week do you exercise enough to make your heart beat faster? 3 or less    How many minutes a day do you exercise enough to make your heart beat faster? 9 or less    How many days per week do you miss taking your medication? 0         Video Start Time: 1113    The patient continues to be bothered by a number of symptoms that have raised his concern for possible angina. He has recently been following closely with cardiology, in fact, he has just completed a virtual visit with them yesterday.   Due to his reports of subjective dyspnea while taking Brilinta, he was switched to Plavix. He still has the impression that this too may have contributed to his episodes of dyspnea. Most of his dyspnea and fatigue is noted with walking. He knows that he must remain on a platelet inhibiting agent for a minimum of 6 months in spite of any potential adverse effects that it may cause, due to his recent stent placement.     He reports being told by other providers that he likely will require GI and pulmonary consultations to help find out positive explanations for his recurrent chest discomfort and dyspnea. Initially prior to his first diagnosis of coronary disease and stent placement, he was reporting some epigastric symptoms. These seem in fact to have improved with use of a PPI regularly. However, he has also had very longstanding difficulty with food seeming to get stuck in his throat with swallowing. He has for a long time taken frequent water and chewed his food more carefully which has resulted in improvement.     The patient has developed \"waves of heat\", noting that his face and ears will turn red at times. He is unsure whether this is an adverse effect of 1 of his current medications, he suspects possibly metoprolol Plavix or amlodipine. He has recently " started Imdur and is uncertain whether this might cause him some adverse effects.     Cardiology has advised him that they do not feel as though his symptoms represent unstable angina or are related to his recent stent placement. He is not yet certain of this. A stress test has been scheduled for Friday, and if abnormal, coronary angiography will be repeated.     We discussed that many of his somatic symptoms might have a relationship to anxiety, perhaps to a chronic stable degree but aggravated clearly by recent diagnosis of coronary disease requiring stent placement. He understands that many of his symptoms are atypical for angina, however, he also points out that his anginal equivalent symptoms leading up to his stent placement were also atypical in nature. We discussed the possibility of empirically trying medications, either short acting benzodiazepines for abrupt anxiety or panic, or medications in the SSRI class for more chronic anxiety. While the patient acknowledges that anxiety may peer play a role in his symptomatology, he is not yet ready to try starting a medication, certainly not another daily medication.     We also discussed perhaps spacing out the timing of his mostly once daily medications, so that if 1 of them does seem to cause him adverse effects, at the time separation might help him better sort out which of the medications might be giving him which side effects.       Past medical, family and social histories as well as medications reviewed and updated as needed.  Reviewed and updated as needed this visit by Provider         Review of Systems   REVIEW OF SYSTEMS: The following systems have been completely reviewed and are negative except as noted above:   Constitutional, HEENT, respiratory, cardiovascular, gastrointestinal, musculoskeletal, psychiatric systems.        Objective    There were no vitals taken for this visit.  Estimated body mass index is 29.53 kg/m  as calculated from the  "following:    Height as of 5/12/20: 1.88 m (6' 2\").    Weight as of 5/19/20: 104.3 kg (230 lb).  Physical Exam     GENERAL: Healthy, alert and no distress  EYES: Eyes grossly normal to inspection.  No discharge or erythema, or obvious scleral/conjunctival abnormalities.  RESP: No audible wheeze, cough, or visible cyanosis.  No visible retractions or increased work of breathing.    SKIN: Visible skin clear. No significant rash, abnormal pigmentation or lesions.  NEURO: Cranial nerves grossly intact.  Mentation and speech appropriate for age.  PSYCH: Mentation appears normal, affect normal/bright, judgement and insight intact, normal speech and appearance well-groomed.      Diagnostic Test Results:  Labs reviewed in Epic        Assessment & Plan   Assessment & Plan   (R07.9) Chest pain, unspecified type (primary encounter diagnosis)   Comment: Offered both GI and pulmonary consultations for atypical chest symptoms. He acknowledges that the symptoms may be noncardiac, but also that these subspecialist may find no pathology to explain his atypical chest symptoms. He agrees to revisit potential treatment for an anxiety related because, but wishes to defer this.   Plan: GASTROENTEROLOGY ADULT REF CONSULT ONLY,   PULMONARY MEDICINE REFERRAL     (R13.10) Dysphagia, unspecified type   Comment: Although the patient's epigastric symptoms have improved, he does have some chronic dysphagia symptoms for which GI consultation may still well be warranted. GI consultation is offered.   Plan: GASTROENTEROLOGY ADULT REF CONSULT ONLY     (R06.09) Dyspnea on exertion   Comment: Offered pulmonary consultation for her symptoms of dyspnea on exertion which are somewhat subacute. Discussed that they would be better suited   Plan: PULMONARY MEDICINE REFERRAL    Follow up video visit advised in about 2-4 weeks.     No follow-ups on file.    Ildefonso Lucas MD,   [facility]      Video-Visit Details    Type of service:  Video " Visit    Video End Time: 1150    Originating Location (pt. Location): Home    Distant Location (provider location):  [facility]     Platform used for Video Visit: Evaristo    No follow-ups on file.       Ildefonso Lucas MD

## 2020-05-21 ENCOUNTER — HOSPITAL ENCOUNTER (OUTPATIENT)
Dept: CARDIOLOGY | Facility: CLINIC | Age: 60
Discharge: HOME | End: 2020-05-21
Attending: INTERNAL MEDICINE | Admitting: INTERNAL MEDICINE
Payer: COMMERCIAL

## 2020-05-21 VITALS — SYSTOLIC BLOOD PRESSURE: 102 MMHG | OXYGEN SATURATION: 98 % | DIASTOLIC BLOOD PRESSURE: 70 MMHG | HEART RATE: 70 BPM

## 2020-05-21 DIAGNOSIS — I25.10 CORONARY ARTERY DISEASE INVOLVING NATIVE CORONARY ARTERY OF NATIVE HEART WITHOUT ANGINA PECTORIS: ICD-10-CM

## 2020-05-21 LAB
CV STRESS MAX HR HE: 144
NUC STRESS EJECTION FRACTION: 70 %
RATE PRESSURE PRODUCT: NORMAL
STRESS ANGINA INDEX: 1
STRESS ECHO BASELINE DIASTOLIC HE: 70
STRESS ECHO BASELINE HR: 68
STRESS ECHO BASELINE SYSTOLIC BP: 124
STRESS ECHO CALCULATED PERCENT HR: 90 %
STRESS ECHO LAST STRESS DIASTOLIC BP: 60
STRESS ECHO LAST STRESS SYSTOLIC BP: 176
STRESS ECHO POST ESTIMATED WORKLOAD: 10.4 METS
STRESS ECHO POST EXERCISE DUR MIN: 7 MIN
STRESS ECHO POST EXERCISE DUR SEC: 31 SEC
STRESS ECHO TARGET HR: 160

## 2020-05-21 PROCEDURE — 93018 CV STRESS TEST I&R ONLY: CPT | Performed by: INTERNAL MEDICINE

## 2020-05-21 PROCEDURE — 93016 CV STRESS TEST SUPVJ ONLY: CPT | Performed by: INTERNAL MEDICINE

## 2020-05-21 PROCEDURE — 78452 HT MUSCLE IMAGE SPECT MULT: CPT | Mod: 26 | Performed by: INTERNAL MEDICINE

## 2020-05-21 PROCEDURE — A9502 TC99M TETROFOSMIN: HCPCS | Performed by: INTERNAL MEDICINE

## 2020-05-21 PROCEDURE — 34300033 ZZH RX 343: Performed by: INTERNAL MEDICINE

## 2020-05-21 PROCEDURE — 78452 HT MUSCLE IMAGE SPECT MULT: CPT

## 2020-05-21 RX ADMIN — TETROFOSMIN 3.88 MCI.: 1.38 INJECTION, POWDER, LYOPHILIZED, FOR SOLUTION INTRAVENOUS at 09:06

## 2020-05-21 RX ADMIN — TETROFOSMIN 12.69 MCI.: 1.38 INJECTION, POWDER, LYOPHILIZED, FOR SOLUTION INTRAVENOUS at 10:42

## 2020-05-26 ENCOUNTER — TELEPHONE (OUTPATIENT)
Dept: CARDIOLOGY | Facility: CLINIC | Age: 60
End: 2020-05-26

## 2020-05-26 DIAGNOSIS — I25.10 CORONARY ARTERY DISEASE INVOLVING NATIVE CORONARY ARTERY OF NATIVE HEART WITHOUT ANGINA PECTORIS: ICD-10-CM

## 2020-05-26 RX ORDER — ISOSORBIDE MONONITRATE 30 MG/1
30 TABLET, EXTENDED RELEASE ORAL DAILY
COMMUNITY
Start: 2020-05-26 | End: 2020-06-03

## 2020-05-26 NOTE — TELEPHONE ENCOUNTER
Patient called back to discuss why Imdur was not stopped prior to his stress test. Apparently, he saw something on Mychart that said this was to be held and he is wondering if this hd any bearing on his stress test results. I told him that unless directed by the doctor, patients are to take it. Moreover, it would not adversely affect the outcomes of his study. He accepted this.  I did encourage him to try Plavix in the evening starting tonight to see if this in anyway mitigates the fogginess he is still experiencing and he agreed to do so.

## 2020-05-26 NOTE — RESULT ENCOUNTER NOTE
Payton,  Dr Wayne sent this to me by mistake. He would follow up at [facility] and only had a virtual visit with Dr Wayne on his [place] day

## 2020-05-26 NOTE — TELEPHONE ENCOUNTER
----- Message from Abelino Wayne MD sent at 5/25/2020 10:41 PM CDT -----  Please tell the pt that there is no ischemia noted on the test which means that his s/s are most likely not cardiac. Also check and see how he is doing with Imdur and if his CP is better. If he continues to have chest pain please add Ranolazine 500 mg po bid and have him see an harriet in 1 month. Thanks    Abelino

## 2020-05-26 NOTE — TELEPHONE ENCOUNTER
"Phone call to patient to convey to him that Dr. Wayne reviewed his stress test and did no see any ischemia from the scans. Thus, its unlikely that his chest pain is not cardiac.  Patient tried Imdur 60 for 2 days and went back to 30 MG because of intense headaches. Tylenol did not help.He reports that since talking to Dr. Wayne last Tuesday ( 5/19)  he no longer has at rest pain but still gets pain on exertion. But he is reluctant to try Ranexa at this time for exertional chest discomfort.He no longer has CP , fogginess or sob when he wakes up in the am but within an hour of taking metoprolol he gets the head fog and shortness of breath. If he takes metoprolol with food he doesn't get the flushing. He is still taking Plavix in the am though Dr. Wayne last  week suggested he try it in the pm.  Patient has a video conference tomorrow with GI.Patient rep[rots that this past Saturday he started getting some heart burn and Pepcid helped for a short time.  Her is still not happy with the way he is feeling and feels that some of his meds are 'burning a hole in my gut\".He is asking if metoprolol could be the culprit for his troubles.  His BP's since stopping amlodipine are in the 130-140's and is wondering if he should go back on it. I told him to wait until his Roxanne 3 rd visit with Janene SAMS to discuss this.    "

## 2020-05-27 ENCOUNTER — TRANSFERRED RECORDS (OUTPATIENT)
Dept: HEALTH INFORMATION MANAGEMENT | Facility: CLINIC | Age: 60
End: 2020-05-27

## 2020-05-28 ENCOUNTER — TELEPHONE (OUTPATIENT)
Dept: CARDIOLOGY | Facility: CLINIC | Age: 60
End: 2020-05-28

## 2020-05-28 NOTE — TELEPHONE ENCOUNTER
Questioning if there are any issues with taking Plavix along with Protonix.   Per [facility], there are no drug to drug interactions between the two medications.   Patient updated.

## 2020-06-03 ENCOUNTER — VIRTUAL VISIT (OUTPATIENT)
Dept: CARDIOLOGY | Facility: CLINIC | Age: 60
End: 2020-06-03
Attending: NURSE PRACTITIONER
Payer: COMMERCIAL

## 2020-06-03 VITALS
HEART RATE: 55 BPM | SYSTOLIC BLOOD PRESSURE: 125 MMHG | BODY MASS INDEX: 29.15 KG/M2 | DIASTOLIC BLOOD PRESSURE: 76 MMHG | WEIGHT: 227 LBS

## 2020-06-03 DIAGNOSIS — E78.5 HYPERLIPIDEMIA LDL GOAL <70: ICD-10-CM

## 2020-06-03 DIAGNOSIS — I25.10 CORONARY ARTERY DISEASE INVOLVING NATIVE CORONARY ARTERY OF NATIVE HEART WITHOUT ANGINA PECTORIS: Primary | ICD-10-CM

## 2020-06-03 PROCEDURE — 99214 OFFICE O/P EST MOD 30 MIN: CPT | Mod: 95 | Performed by: NURSE PRACTITIONER

## 2020-06-03 RX ORDER — PANTOPRAZOLE SODIUM 40 MG/1
40 TABLET, DELAYED RELEASE ORAL 2 TIMES DAILY
COMMUNITY
End: 2020-08-05

## 2020-06-03 NOTE — LETTER
6/3/2020    [facility]  [address] [place] [address]    RE: Amado Sesayclinton       Dear Colleague,    I had the pleasure of seeing Amado Veliz in the [facility].    Amado Veliz is a 60 year old male who is being evaluated via a billable video visit.      Amado Veliz is a pleasant 60-year-old male who carries a past medical history significant for coronary artery disease status post drug-eluting stent 4.0 x 16 mm Synergy to the proximal RCA (4/2020), hyperlipidemia, and remote tobacco abuse.    He was last seen on 5/19/2020 after reports of multiple episodes of chest pain.  He was started on amlodipine but stopped it after 1 single dose after it caused worsening chest discomfort.  He also stopped isosorbide due to severe headache and no improvement in chest pain.  At his 1 week post PCI follow-up visit, he reported similar symptoms and was transitioned from Brilinta to Plavix.  He subsequently underwent a follow-up nuclear exercise stress test which demonstrated a small area of nontransmural infarction in the distal inferior septal segment of the LV.  Ejection fraction was noted to be 70%.  Today he is participating in a follow-up video visit.    He is feeling well on a cardiac standpoint, stating his chest pain has since resolved.  He admits to occasional flushing when taking his medication but noticed a significant improvement if he eats prior to taking medications.  He is scheduled to follow-up with GI over the next few months who have recommended an endoscopy and barium swallow for further evaluation.  He denies shortness of breath, palpitations, PND, orthopnea, presyncope, syncope, or lower extremity edema.  His only complaint is sleep disturbance which he attributes to metoprolol.     Blood pressure is well controlled at 125/76, managed well on metoprolol  He denies any evidence of bleeding on dual antiplatelet therapy    Activity  level is unchanged, able to walk long distance and inclines without any reproducible symptoms.  He is hoping to restart cardiac rehab in the near future.      Assessment/Plan     1. CAD -s/p PCI to the proximal RCA using a 4.0 x 16 mm Synergy drug-eluting stent (4/2020). Chest pain has resolved. Intolerant to Isosorbide due to headache. Continue on DAPT x 12 months uninterrupted, metoprolol, and statin. Due to sleep disturbance, he plans to switch Toprol to AM and monitor for the next week. He will call office in 1 week with update. Encourage exercise, cardiac rehab, weight loss, and heart healthy diet.  Follow-up with GI for further evaluation of reflux-like symptoms.  I have asked him to avoid any invasive procedures for 3 months post PCI.  He has been instructed to to not interrupt dual antiplatelet therapy for any invasive procedure.     2.  Hyperlipidemia -LDL not at goal, initiated on statin at time of discharge.  Follow-up fasting lipid panel in 8 weeks     Follow-up plan -follow-up with [facility] in 3 months or sooner if needed.  Fasting lipid panel in 1 month.    Video-Visit Details    Type of service:  Video Visit    Video Time 34 min  Originating Location (pt. Location): Home    Distant Location (provider location):  [facility]-Home office  Platform used for Video Visit: GLORIA Dior, NAOMY  Pager [phone]  6/3/2020      Thank you for allowing me to participate in the care of your patient.    Sincerely,     GLORIA Paz CNP     [facility]

## 2020-06-03 NOTE — PATIENT INSTRUCTIONS
Thanks for participating in a video visit with the [facility] today.    Doing well on a cardiac standpoint  Chest pain has since resolved  Encouraged to take medications with food to avoid reflux symptoms  Follow-up with GI as scheduled.  Avoid any invasive procedures for 3 months post stent placement.  Do not interrupt Plavix x12 months  Due to sleep disturbance, switch Toprol to a.m. and monitor symptoms.  Call office in 1 week with update  Continue on current medical therapy  Encourage exercise, weight loss, and heart healthy diet    Follow up in 3 months with [facility].    Please call my nurse at [phone] with any questions or concerns.    Scheduling phone number: [phone]  Reminder: Please bring in all current medications, over the counter supplements and vitamin bottles to your next appointment.

## 2020-06-03 NOTE — PROGRESS NOTES
"Amado Veliz is a 60 year old male who is being evaluated via a billable video visit.      The patient has been notified of following:     \"This video visit will be conducted via a call between you and your physician/provider. We have found that certain health care needs can be provided without the need for an in-person physical exam.  This service lets us provide the care you need with a video conversation.  If a prescription is necessary we can send it directly to your pharmacy.  If lab work is needed we can place an order for that and you can then stop by our lab to have the test done at a later time.    Video visits are billed at different rates depending on your insurance coverage.  Please reach out to your insurance provider with any questions.    If during the course of the call the physician/provider feels a video visit is not appropriate, you will not be charged for this service.\"    Patient has given verbal consent for Video visit? Yes    How would you like to obtain your AVS? Mail a copy    Patient would like the video invitation sent by: Text to cell phone: [phone]    Will anyone else be joining your video visit? No      Review Of Systems  Skin: Negative  Eyes: Negative  Ears/Nose/Throat: Positive for tinnitus  Respiratory: Negative  Cardiovascular: Positive for chest pain on occ - has improved, lightheaded/dizziness in AM after taking plavix  Gastrointestinal: Positive or burning/pain below sternum and under rib cage  Genitourinary: Negative  Musculoskeletal: Negative  Neurologic: Negative  Psychiatric: Positive for sleep disturbances - only sleeping three hours nightly, stress, anxiety  Hematologic/Lymphatic/Immunologic: Negative  Endocrine: Negative    Patient reported vitals:  BP: 125/76  Heart rate: 55  Weight: 227 lbs    Sana Evangelista, [facility]    HPI  Amado Veliz is a pleasant 60-year-old male who carries a past medical history significant for coronary artery disease status post " drug-eluting stent 4.0 x 16 mm Synergy to the proximal RCA (4/2020), hyperlipidemia, and remote tobacco abuse.    He was last seen on 5/19/2020 after reports of multiple episodes of chest pain.  He was started on amlodipine but stopped it after 1 single dose after it caused worsening chest discomfort.  He also stopped isosorbide due to severe headache and no improvement in chest pain.  At his 1 week post PCI follow-up visit, he reported similar symptoms and was transitioned from Brilinta to Plavix.  He subsequently underwent a follow-up nuclear exercise stress test which demonstrated a small area of nontransmural infarction in the distal inferior septal segment of the LV.  Ejection fraction was noted to be 70%.  Today he is participating in a follow-up video visit.    He is feeling well on a cardiac standpoint, stating his chest pain has since resolved.  He admits to occasional flushing when taking his medication but noticed a significant improvement if he eats prior to taking medications.  He is scheduled to follow-up with GI over the next few months who have recommended an endoscopy and barium swallow for further evaluation.  He denies shortness of breath, palpitations, PND, orthopnea, presyncope, syncope, or lower extremity edema.  His only complaint is sleep disturbance which he attributes to metoprolol.     Blood pressure is well controlled at 125/76, managed well on metoprolol  He denies any evidence of bleeding on dual antiplatelet therapy    Activity level is unchanged, able to walk long distance and inclines without any reproducible symptoms.  He is hoping to restart cardiac rehab in the near future.      Assessment/Plan     1. CAD -s/p PCI to the proximal RCA using a 4.0 x 16 mm Synergy drug-eluting stent (4/2020). Chest pain has resolved. Intolerant to Isosorbide due to headache. Continue on DAPT x 12 months uninterrupted, metoprolol, and statin. Due to sleep disturbance, he plans to switch Toprol to AM  and monitor for the next week. He will call office in 1 week with update. Encourage exercise, cardiac rehab, weight loss, and heart healthy diet.  Follow-up with GI for further evaluation of reflux-like symptoms.  I have asked him to avoid any invasive procedures for 3 months post PCI.  He has been instructed to to not interrupt dual antiplatelet therapy for any invasive procedure.     2.  Hyperlipidemia -LDL not at goal, initiated on statin at time of discharge.  Follow-up fasting lipid panel in 8 weeks     Follow-up plan -follow-up with [facility] in 3 months or sooner if needed.  Fasting lipid panel in 1 month.    Video-Visit Details    Type of service:  Video Visit    Video Time 34 min  Originating Location (pt. Location): Home    Distant Location (provider location):  [facility]-Home office  Platform used for Video Visit: GLORIA Dior, CNP  Pager [phone]  6/3/2020

## 2020-06-10 ENCOUNTER — TRANSFERRED RECORDS (OUTPATIENT)
Dept: HEALTH INFORMATION MANAGEMENT | Facility: CLINIC | Age: 60
End: 2020-06-10

## 2020-07-01 ENCOUNTER — OFFICE VISIT (OUTPATIENT)
Dept: INTERNAL MEDICINE | Facility: CLINIC | Age: 60
End: 2020-07-01
Payer: COMMERCIAL

## 2020-07-01 ENCOUNTER — TELEPHONE (OUTPATIENT)
Dept: INTERNAL MEDICINE | Facility: CLINIC | Age: 60
End: 2020-07-01

## 2020-07-01 ENCOUNTER — TRANSFERRED RECORDS (OUTPATIENT)
Dept: HEALTH INFORMATION MANAGEMENT | Facility: CLINIC | Age: 60
End: 2020-07-01

## 2020-07-01 VITALS
HEIGHT: 74 IN | HEART RATE: 67 BPM | BODY MASS INDEX: 29.8 KG/M2 | SYSTOLIC BLOOD PRESSURE: 116 MMHG | DIASTOLIC BLOOD PRESSURE: 64 MMHG | OXYGEN SATURATION: 100 % | TEMPERATURE: 97.8 F | WEIGHT: 232.2 LBS | RESPIRATION RATE: 18 BRPM

## 2020-07-01 DIAGNOSIS — R10.13 EPIGASTRIC PAIN: Primary | ICD-10-CM

## 2020-07-01 DIAGNOSIS — R07.9 CHEST PAIN, UNSPECIFIED TYPE: ICD-10-CM

## 2020-07-01 DIAGNOSIS — Z12.5 SCREENING PSA (PROSTATE SPECIFIC ANTIGEN): ICD-10-CM

## 2020-07-01 DIAGNOSIS — L57.0 ACTINIC KERATOSIS: ICD-10-CM

## 2020-07-01 DIAGNOSIS — E78.5 HYPERLIPIDEMIA LDL GOAL <70: ICD-10-CM

## 2020-07-01 DIAGNOSIS — H53.9 VISUAL DISTURBANCE: ICD-10-CM

## 2020-07-01 DIAGNOSIS — F41.9 ANXIETY: ICD-10-CM

## 2020-07-01 DIAGNOSIS — L82.1 SEBORRHEIC KERATOSES: ICD-10-CM

## 2020-07-01 LAB
BASOPHILS # BLD AUTO: 0 10E9/L (ref 0–0.2)
BASOPHILS NFR BLD AUTO: 0.6 %
CRP SERPL-MCNC: <2.9 MG/L (ref 0–8)
DIFFERENTIAL METHOD BLD: NORMAL
EOSINOPHIL # BLD AUTO: 0.3 10E9/L (ref 0–0.7)
EOSINOPHIL NFR BLD AUTO: 5 %
ERYTHROCYTE [DISTWIDTH] IN BLOOD BY AUTOMATED COUNT: 12.6 % (ref 10–15)
HCT VFR BLD AUTO: 42.9 % (ref 40–53)
HGB BLD-MCNC: 14.6 G/DL (ref 13.3–17.7)
LIPASE SERPL-CCNC: 115 U/L (ref 73–393)
LYMPHOCYTES # BLD AUTO: 1.3 10E9/L (ref 0.8–5.3)
LYMPHOCYTES NFR BLD AUTO: 26.1 %
MCH RBC QN AUTO: 30.7 PG (ref 26.5–33)
MCHC RBC AUTO-ENTMCNC: 34 G/DL (ref 31.5–36.5)
MCV RBC AUTO: 90 FL (ref 78–100)
MONOCYTES # BLD AUTO: 0.5 10E9/L (ref 0–1.3)
MONOCYTES NFR BLD AUTO: 9.6 %
NEUTROPHILS # BLD AUTO: 2.9 10E9/L (ref 1.6–8.3)
NEUTROPHILS NFR BLD AUTO: 58.7 %
PLATELET # BLD AUTO: 242 10E9/L (ref 150–450)
RBC # BLD AUTO: 4.75 10E12/L (ref 4.4–5.9)
WBC # BLD AUTO: 5 10E9/L (ref 4–11)

## 2020-07-01 PROCEDURE — G0103 PSA SCREENING: HCPCS | Performed by: INTERNAL MEDICINE

## 2020-07-01 PROCEDURE — 83690 ASSAY OF LIPASE: CPT | Performed by: INTERNAL MEDICINE

## 2020-07-01 PROCEDURE — 99214 OFFICE O/P EST MOD 30 MIN: CPT | Performed by: INTERNAL MEDICINE

## 2020-07-01 PROCEDURE — 80053 COMPREHEN METABOLIC PANEL: CPT | Performed by: INTERNAL MEDICINE

## 2020-07-01 PROCEDURE — 85025 COMPLETE CBC W/AUTO DIFF WBC: CPT | Performed by: INTERNAL MEDICINE

## 2020-07-01 PROCEDURE — 36415 COLL VENOUS BLD VENIPUNCTURE: CPT | Performed by: INTERNAL MEDICINE

## 2020-07-01 PROCEDURE — 80061 LIPID PANEL: CPT | Performed by: INTERNAL MEDICINE

## 2020-07-01 PROCEDURE — 86140 C-REACTIVE PROTEIN: CPT | Performed by: INTERNAL MEDICINE

## 2020-07-01 RX ORDER — CLONAZEPAM 0.5 MG/1
0.5 TABLET ORAL 2 TIMES DAILY PRN
Qty: 20 TABLET | Refills: 0 | Status: SHIPPED | OUTPATIENT
Start: 2020-07-01 | End: 2020-11-27

## 2020-07-01 RX ORDER — ISOSORBIDE MONONITRATE 60 MG/1
TABLET, EXTENDED RELEASE ORAL
COMMUNITY
Start: 2020-05-04 | End: 2020-07-01

## 2020-07-01 ASSESSMENT — MIFFLIN-ST. JEOR: SCORE: 1933

## 2020-07-01 NOTE — PATIENT INSTRUCTIONS
"Will get back to you with today's labs (you should see the results soon on MyChart).    Referral options noted for Eye and dermatology.     Keep working with GI and cardiology.     New prescription provided for abrupt anxiety, called \"clonazepam\" (like a rapid acting Valium).     See me in about two months.   "

## 2020-07-01 NOTE — PROGRESS NOTES
Subjective   1428---1500    Amado Veliz is a 60 year old male who presents to clinic today for health counseling regarding several health issues. 32 minutes were spent with the patient face to face today, over 50% of which was devoted to health counseling and education regarding these issues.     Patient is being seen for a follow up ( LIPIDS AND HYPERTENSION).  HPI   Hyperlipidemia Follow-Up      Are you regularly taking any medication or supplement to lower your cholesterol?   Yes- ATORVASTATIN    Are you having muscle aches or other side effects that you think could be caused by your cholesterol lowering medication?  Yes- Patient thinks medication my be the cause of his insomnia    Hypertension Follow-up      Do you check your blood pressure regularly outside of the clinic? Yes     Are you following a low salt diet? Yes    Are your blood pressures ever more than 140 on the top number (systolic) OR more   than 90 on the bottom number (diastolic), for example 140/90? No      How many servings of fruits and vegetables do you eat daily?  4 or more    On average, how many sweetened beverages do you drink each day (Examples: soda, juice, sweet tea, etc.  Do NOT count diet or artificially sweetened beverages)?   0    How many days per week do you exercise enough to make your heart beat faster? 7    How many minutes a day do you exercise enough to make your heart beat faster? 30 - 60    How many days per week do you miss taking your medication? 0    The patient is seen in follow-up of atypical chest pain, dysphagia, upper abdominal discomfort, insomnia and some anxiety symptoms.    At the time of patient's last appointment with me on May 20, he was offered referrals to gastroenterology and pulmonary.  He is deferring the pulmonary consultation, which was for dyspnea with exertion.  He has been able to walk more regularly and is less concerned now about dyspnea with this.  He has, however, noted some symptoms upon  "completing exercise.    The patient saw Dr. Negro Donald from [facility] on May 27.  Regarding the patient's somewhat chronic dysphagia, he suggested that a barium esophagram could be conducted.  EGD was considered, but will likely be postponed until after the patient is able to come off of Plavix.  The patient has increased his Protonix 40 mg to twice daily dosing, which for several days did seem to result in improvement of chest discomfort and epigastric discomfort.      The patient has seen Yanely Field of [facility] in a virtual visit on Roxanne 10.  She advised that he continue taking Protonix twice daily and add Pepcid at bedtime.  She noted that endoscopy has been recommended to be deferred until after he completes his course of Plavix.  GI has recommended a number of lab tests which will be ordered today, including a lipase, hepatic panel, CRP, CBC, and H. pylori testing.    Since about last Thursday or Friday he noted a recurrence of this epigastric discomfort.  He has noted at times that when he pushes on an area in his upper abdomen it will influence the discomfort, at times will \"amplify it\".  He has not noted a consistent pattern of eating either improving or worsening the epigastric discomfort.    He has not had lower abdominal pain, black or bloody stools.  He is prone to a slight degree of alternating constipation and diarrhea.    The patient seems less concerned about his left-sided chest discomfort, which seems to have improved.  He did have a stress nuclear test on May 21 which showed only a mild count reduction in the apical inferior region, but no evidence for ischemia.  The patient believes that he is tolerating Plavix better than he did Brilinta.    He does report impaired sleep quality.  He had attributed this to immediate release metoprolol, and Janene Fragoso of cardiology worked with him on reducing his dose and changing to extended release form of " metoprolol at their Roxanne 3 appointment.  He anticipates asking cardiology at his next appointment whether this metoprolol can be reduced further and dosing.    After the patient's departure from the clinic, I have reviewed the adverse effect profile of atorvastatin, for which insomnia is reported by roughly 5% of patients.  A lipid panel is ordered to update his lipid status, along with a number of other lab tests that were suggested by [facility].    He does report a general sense of increased worry with all of the symptoms, and to some degree a residual sense of worry or anxiety with any chest discomfort as he his initial presentation with an acute MI with somewhat atypical in nature.  He has, however, resigned that his cardiac work-up at present seems complete.  He will continue to work with cardiology in follow-up, and is scheduled to see them again on August 26.    Although he acknowledges some degree of anxiety and worry, he is not interested in adding another daily medication for preventing anxiety.  We did discuss that a more short acting benzo benzodiazepine might be tried episodically for more severe episodes of anxiety.  We discussed that this should be used infrequently as it could become quite habit-forming for many individuals.    The patient reports having previous seborrheic keratoses and actinic keratoses and would like a dermatology referral.  He also is interested in an eye exam and referral for this is offered.    Past medical, family and social histories as well as medications reviewed and updated as needed.  Reviewed and updated as needed this visit by Provider         Review of Systems   REVIEW OF SYSTEMS: The following systems have been completely reviewed and are negative except as noted above:   Constitutional, respiratory, cardiovascular, gastrointestinal, musculoskeletal, hematologic, psychiatric systems.      Vitals: /64   Pulse 67   Temp 97.8  F (36.6  C) (Oral)   " Resp 18   Ht 1.88 m (6' 2\")   Wt 105.3 kg (232 lb 3.2 oz)   SpO2 100%   BMI 29.81 kg/m    BMI= Body mass index is 29.81 kg/m .     Physical Exam   GENERAL: healthy, alert and no distress  RESP: lungs clear to auscultation - no rales, rhonchi or wheezes  CV: regular rate and rhythm, normal S1 S2, no S3 or S4, no murmur, click or rub, no peripheral edema and peripheral pulses strong  ABDOMEN: soft, nontender, no hepatosplenomegaly, no masses and bowel sounds normal  MS: no gross musculoskeletal defects noted, no edema    Diagnostic Test Results:  Labs reviewed in Epic        Assessment & Plan   Health counseling regarding several health issues. 32 minutes were spent with the patient face to face today, over 50% of which was devoted to health counseling and education regarding these issues.     (R10.13) Epigastric pain  (primary encounter diagnosis)  Comment: Continue twice daily Protonix. EGD to be postponed, GI will consider ordering an esophagram if they feel this is indicated. Labs as recommended by GI are ordered.   Plan: CRP, inflammation, Lipase, CBC with platelets         and differential, Helicobacter pylori Antigen         Stool, CANCELED: H. pylori IgG  Abs ([facility])    (R07.9) Chest pain, unspecified type  Comment: Symptoms most recently seem a bit improved. F/u with cardiology as they advise. Continue current meds.     (E78.5) Hyperlipidemia LDL goal <70  Comment: Update lipids, continue Lipitor. Of note, it is noted after the patient's departure today that up to 5% of patients will report insomnia on atorvastatin.   Plan: Lipid panel reflex to direct LDL Non-fasting,         Comprehensive metabolic panel (BMP + Alb, Alk         Phos, ALT, AST, Total. Bili, TP)          (F41.9) Anxiety  Comment: Patient declines a daily medication such as an serotonin specific reuptake inhibitor. Offered a small number of clonazepam tablets to try taking empirically when subjective anxiety is more severe.   Plan: " "clonazePAM (KLONOPIN) 0.5 MG tablet          (L82.1) Seborrheic keratoses  (L57.0) Actinic keratosis  Comment: Offered dermatology consult.   Plan: DERMATOLOGY REFERRAL          (H53.9) Visual disturbance  Comment: Offered ophthalmology consult.   Plan: OPHTHALMOLOGY ADULT REFERRAL          (Z12.5) Screening PSA (prostate specific antigen)  Plan: PSA, screen       Patient Instructions   Will get back to you with today's labs (you should see the results soon on MyChart).    Referral options noted for Eye and dermatology.     Keep working with GI and cardiology.     New prescription provided for abrupt anxiety, called \"clonazepam\" (like a rapid acting Valium).     See me in about two months.       Return in about 2 months (around 9/1/2020).    Ildefonso Lucas MD,   [facility]        "

## 2020-07-01 NOTE — PROGRESS NOTES
H pylori tests are no longer done by blood but stool and breath tests so I gave the patient a stool kit in case the drs decide to do that test but a serum tube is in the lab freezer. Dr Lucas will talk with the GI and decide. Tho

## 2020-07-01 NOTE — TELEPHONE ENCOUNTER
Patient call to inform Dr. Lucas that GI recommended stool test for H. Pylori test. Patient was given kit for stool kit as stated in notes from office visit today:  H pylori tests are no longer done by blood but stool and breath tests so I gave the patient a stool kit in case the drs decide to do that test but a serum tube is in the lab freezer. Dr Lucas will talk with the GI and decide. [facility]    Please advise if this test should be order,     Thank you.

## 2020-07-02 DIAGNOSIS — R10.13 EPIGASTRIC PAIN: ICD-10-CM

## 2020-07-02 LAB
ALBUMIN SERPL-MCNC: 3.8 G/DL (ref 3.4–5)
ALP SERPL-CCNC: 79 U/L (ref 40–150)
ALT SERPL W P-5'-P-CCNC: 176 U/L (ref 0–70)
ANION GAP SERPL CALCULATED.3IONS-SCNC: 3 MMOL/L (ref 3–14)
AST SERPL W P-5'-P-CCNC: 114 U/L (ref 0–45)
BILIRUB SERPL-MCNC: 0.6 MG/DL (ref 0.2–1.3)
BUN SERPL-MCNC: 12 MG/DL (ref 7–30)
CALCIUM SERPL-MCNC: 8.7 MG/DL (ref 8.5–10.1)
CHLORIDE SERPL-SCNC: 108 MMOL/L (ref 94–109)
CHOLEST SERPL-MCNC: 92 MG/DL
CO2 SERPL-SCNC: 27 MMOL/L (ref 20–32)
CREAT SERPL-MCNC: 0.77 MG/DL (ref 0.66–1.25)
GFR SERPL CREATININE-BSD FRML MDRD: >90 ML/MIN/{1.73_M2}
GLUCOSE SERPL-MCNC: 92 MG/DL (ref 70–99)
HDLC SERPL-MCNC: 27 MG/DL
LDLC SERPL CALC-MCNC: 36 MG/DL
NONHDLC SERPL-MCNC: 65 MG/DL
POTASSIUM SERPL-SCNC: 4.4 MMOL/L (ref 3.4–5.3)
PROT SERPL-MCNC: 7.2 G/DL (ref 6.8–8.8)
PSA SERPL-ACNC: 0.82 UG/L (ref 0–4)
SODIUM SERPL-SCNC: 138 MMOL/L (ref 133–144)
TRIGL SERPL-MCNC: 144 MG/DL

## 2020-07-02 PROCEDURE — 87338 HPYLORI STOOL AG IA: CPT | Performed by: INTERNAL MEDICINE

## 2020-07-02 NOTE — TELEPHONE ENCOUNTER
Noted. Stool test order already placed. Agree with pursuing this. Please advise patient that I agree with this approach.

## 2020-07-03 ENCOUNTER — MYC MEDICAL ADVICE (OUTPATIENT)
Dept: INTERNAL MEDICINE | Facility: CLINIC | Age: 60
End: 2020-07-03

## 2020-07-06 LAB — H PYLORI AG STL QL IA: NEGATIVE

## 2020-07-08 ENCOUNTER — MYC MEDICAL ADVICE (OUTPATIENT)
Dept: INTERNAL MEDICINE | Facility: CLINIC | Age: 60
End: 2020-07-08

## 2020-07-08 ENCOUNTER — TRANSFERRED RECORDS (OUTPATIENT)
Dept: HEALTH INFORMATION MANAGEMENT | Facility: CLINIC | Age: 60
End: 2020-07-08

## 2020-07-08 ENCOUNTER — TELEPHONE (OUTPATIENT)
Dept: INTERNAL MEDICINE | Facility: CLINIC | Age: 60
End: 2020-07-08

## 2020-07-08 DIAGNOSIS — E78.5 HYPERLIPIDEMIA LDL GOAL <70: ICD-10-CM

## 2020-07-08 DIAGNOSIS — R79.89 LFT ELEVATION: Primary | ICD-10-CM

## 2020-07-08 NOTE — TELEPHONE ENCOUNTER
Patient called and was concerned about his lab results for his AST AND ALT. Patient asked if he could receive a call from Dr. Lucas to see what his next steps should be.

## 2020-07-08 NOTE — TELEPHONE ENCOUNTER
Please see message below and advise. Please also see additional [employer] message from 7/8/20

## 2020-07-17 ENCOUNTER — HOSPITAL ENCOUNTER (OUTPATIENT)
Dept: ULTRASOUND IMAGING | Facility: CLINIC | Age: 60
Discharge: HOME | End: 2020-07-17
Attending: INTERNAL MEDICINE | Admitting: INTERNAL MEDICINE
Payer: COMMERCIAL

## 2020-07-17 DIAGNOSIS — R07.9 CHEST PAIN, UNSPECIFIED TYPE: ICD-10-CM

## 2020-07-17 DIAGNOSIS — R13.19 ESOPHAGEAL DYSPHAGIA: ICD-10-CM

## 2020-07-17 PROCEDURE — 76700 US EXAM ABDOM COMPLETE: CPT

## 2020-07-20 ENCOUNTER — MYC MEDICAL ADVICE (OUTPATIENT)
Dept: CARDIOLOGY | Facility: CLINIC | Age: 60
End: 2020-07-20

## 2020-07-20 ENCOUNTER — TRANSFERRED RECORDS (OUTPATIENT)
Dept: HEALTH INFORMATION MANAGEMENT | Facility: CLINIC | Age: 60
End: 2020-07-20

## 2020-07-21 NOTE — TELEPHONE ENCOUNTER
Called pt to assess symptoms. He states it is not at all like the symptoms he had prior to his stenting. It is not associated with exertion. He goes for a 2 mile walk daily without symptoms. He is able to reproduce the pain by pushing on his stomach. He denies shortness of breath, no diaphoresis. Pt advised to follow up with [facility] & Dr. Lucas. Pt verbalized understanding. Elia VANG

## 2020-07-23 ENCOUNTER — HOSPITAL ENCOUNTER (OUTPATIENT)
Dept: GENERAL RADIOLOGY | Facility: CLINIC | Age: 60
Discharge: HOME | End: 2020-07-23
Attending: INTERNAL MEDICINE | Admitting: INTERNAL MEDICINE
Payer: COMMERCIAL

## 2020-07-23 DIAGNOSIS — K21.9 CHEST PAIN DUE TO GERD: ICD-10-CM

## 2020-07-23 DIAGNOSIS — R07.9 CHEST PAIN DUE TO GERD: ICD-10-CM

## 2020-07-23 PROCEDURE — 74240 X-RAY XM UPR GI TRC 1CNTRST: CPT

## 2020-07-23 PROCEDURE — 25500045 ZZH RX 255: Performed by: RADIOLOGY

## 2020-07-23 RX ADMIN — ANTACID/ANTIFLATULENT 4 G: 380; 550; 10; 10 GRANULE, EFFERVESCENT ORAL at 14:39

## 2020-07-24 ENCOUNTER — TRANSFERRED RECORDS (OUTPATIENT)
Dept: HEALTH INFORMATION MANAGEMENT | Facility: CLINIC | Age: 60
End: 2020-07-24

## 2020-07-24 ENCOUNTER — VIRTUAL VISIT (OUTPATIENT)
Dept: INTERNAL MEDICINE | Facility: CLINIC | Age: 60
End: 2020-07-24
Payer: COMMERCIAL

## 2020-07-24 DIAGNOSIS — R07.9 CHEST PAIN, UNSPECIFIED TYPE: Primary | ICD-10-CM

## 2020-07-24 DIAGNOSIS — R10.10 UPPER ABDOMINAL PAIN: ICD-10-CM

## 2020-07-24 DIAGNOSIS — R79.89 ABNORMAL LFTS: ICD-10-CM

## 2020-07-24 DIAGNOSIS — Z95.5 S/P RIGHT CORONARY ARTERY (RCA) STENT PLACEMENT: ICD-10-CM

## 2020-07-24 DIAGNOSIS — I25.10 CORONARY ARTERY DISEASE INVOLVING NATIVE CORONARY ARTERY OF NATIVE HEART, ANGINA PRESENCE UNSPECIFIED: ICD-10-CM

## 2020-07-24 DIAGNOSIS — E78.5 HYPERLIPIDEMIA LDL GOAL <160: ICD-10-CM

## 2020-07-24 PROCEDURE — 99215 OFFICE O/P EST HI 40 MIN: CPT | Mod: 95 | Performed by: INTERNAL MEDICINE

## 2020-07-24 RX ORDER — NITROGLYCERIN 0.4 MG/1
TABLET SUBLINGUAL
Qty: 30 TABLET | Refills: 0 | Status: SHIPPED | OUTPATIENT
Start: 2020-07-24 | End: 2021-09-02

## 2020-07-24 NOTE — PROGRESS NOTES
"Amado Veliz is a 60 year old male who is being evaluated via a billable video visit.      The patient has been notified of following:     \"This video visit will be conducted via a call between you and your physician/provider. We have found that certain health care needs can be provided without the need for an in-person physical exam.  This service lets us provide the care you need with a video conversation.  If a prescription is necessary we can send it directly to your pharmacy.  If lab work is needed we can place an order for that and you can then stop by our lab to have the test done at a later time.    Video visits are billed at different rates depending on your insurance coverage.  Please reach out to your insurance provider with any questions.    If during the course of the call the physician/provider feels a video visit is not appropriate, you will not be charged for this service.\"    Patient has given verbal consent for Video visit? Yes  How would you like to obtain your AVS? Mail a copy  If you are dropped from the video visit, the video invite should be resent to: Text to cell phone: [phone]  Will anyone else be joining your video visit? No    Subjective   (3032---0912)  Amado Veliz is a 60 year old male who presents today via video visit.  Patient is being seen for health counseling regarding lower chest/upper abdominal discomfort.     40 minutes were spent with the patient face to face today, over 50% of which was devoted to health counseling and education regarding these issues.     HPI     Video Start Time: 0832    The patient has been following with gastroenterology with regard to recurrent lower chest or upper abdominal pains and is in the midst of a GI work-up.  He also anticipates follow-up in the near future with cardiology.    For the past couple of weeks he describes having \"intense, sharp, stabbing, searing pain\".  This typically starts a bit beneath the xiphoid process but " "will \"climb up into the chest\" at times.  He notes the pains to be worse in the morning and sometimes in the afternoon.  He is able to press on a certain area in his upper abdomen and cause some pain.    Been following with GI the patient was placed on Protonix 40 mg p.o. twice daily as well as famotidine 10 mg twice daily.  This was started in late May, and he reports that this \"worked until June 24 or 25.  In fact, the patient has at times noted that famotidine will help some of these previous pains within 30 minutes.  Some of these pains may be worse when he lies on his left side.  He is scheduled to have an appointment with Janene Fragoso of cardiology on August 5 and again on August 26.    We also discussed that his recent liver enzymes were around 3 times upper limits of normal, prompting us to at least temporarily discontinue his statin therapy.  We agreed to update his fasting lipids in early August, and work with cardiology on deciding the timing of resumption of the statins.  He has a good indication to continue statin therapy given his coronary artery disease requiring stenting.    We discussed that, on the basis of the clinical history including the location character and reproduction of pain with palpation, that a musculoskeletal source seems most likely.  We reviewed that his recent upper GI showed no pathology.  Evidently he had he has been cleared for an endoscopy, however, it is not certain whether he was cleared for biopsy should that be indicated.  He would like to see Dr. Donald of gastroenterology again, who he saw initially in consultation.      Past medical, family and social histories as well as medications reviewed and updated as needed.    Reviewed and updated as needed this visit by Provider         Review of Systems   REVIEW OF SYSTEMS: The following systems have been completely reviewed and are negative except as noted above:   Constitutional, respiratory, cardiovascular, " gastrointestinal, musculoskeletal systems.        Objective       Physical Exam     GENERAL: Healthy, alert and no distress  EYES: Eyes grossly normal to inspection.  No discharge or erythema, or obvious scleral/conjunctival abnormalities.  RESP: No audible wheeze, cough, or visible cyanosis.  No visible retractions or increased work of breathing.    SKIN: Visible skin clear. No significant rash, abnormal pigmentation or lesions.  NEURO: Cranial nerves grossly intact.  Mentation and speech appropriate for age.  PSYCH: Mentation appears normal, affect normal/bright, judgement and insight intact, normal speech and appearance well-groomed.      Diagnostic Test Results:  Labs reviewed in Epic        Assessment & Plan   (R07.9) Chest pain, unspecified type  (primary encounter diagnosis)  (R10.10) Upper abdominal pain  Comment: Suspect that most of his current upper abdominal and lower chest pains of of musculoskeletal origin. See above discussion. Advised ice, acetaminophen. Continue with any workup advised by cardiology and/or GI. Consider sports medicine or pain clinic consultation if pains persist and still felt of musculoskeletal etiology.     (I25.10) Coronary artery disease involving native coronary artery of native heart, angina presence unspecified  (Z95.5) S/P right coronary artery (RCA) stent placement  Comment: F/u with cardiology as scheduled. Resume statin when able.   Plan: nitroGLYcerin (NITROSTAT) 0.4 MG sublingual         tablet          (E78.5) Hyperlipidemia LDL goal <160  (R94.5) Abnormal LFTs  Comment: Statins on hold due to rise in AST/ALT. Recheck LFT's and lipids in early August.      Return in about 6 weeks (around 9/3/2020) for Routine Visit.    Ildefonso Lucas MD, MD  [facility]      Video-Visit Details    Type of service:  Video Visit    Video End Time: 0912    Originating Location (pt. Location): Home    Distant Location (provider location):  [facility]  ---provider working from home office.     Platform used for Video Visit: AmWell    Lab only 8/5/2020  Follow up with Dr Lucas on 9/3 as scheduled.       Ildefonso Lucas MD,

## 2020-07-26 ENCOUNTER — APPOINTMENT (OUTPATIENT)
Dept: CT IMAGING | Facility: CLINIC | Age: 60
End: 2020-07-26
Attending: EMERGENCY MEDICINE
Payer: COMMERCIAL

## 2020-07-26 ENCOUNTER — HOSPITAL ENCOUNTER (EMERGENCY)
Facility: CLINIC | Age: 60
Discharge: HOME | End: 2020-07-26
Attending: EMERGENCY MEDICINE | Admitting: EMERGENCY MEDICINE
Payer: COMMERCIAL

## 2020-07-26 VITALS
WEIGHT: 220 LBS | TEMPERATURE: 98 F | DIASTOLIC BLOOD PRESSURE: 73 MMHG | BODY MASS INDEX: 28.23 KG/M2 | HEART RATE: 68 BPM | RESPIRATION RATE: 18 BRPM | HEIGHT: 74 IN | OXYGEN SATURATION: 98 % | SYSTOLIC BLOOD PRESSURE: 140 MMHG

## 2020-07-26 DIAGNOSIS — R33.9 URINARY RETENTION: ICD-10-CM

## 2020-07-26 DIAGNOSIS — K59.00 CONSTIPATION, UNSPECIFIED CONSTIPATION TYPE: ICD-10-CM

## 2020-07-26 PROCEDURE — 96374 THER/PROPH/DIAG INJ IV PUSH: CPT

## 2020-07-26 PROCEDURE — 96372 THER/PROPH/DIAG INJ SC/IM: CPT

## 2020-07-26 PROCEDURE — 99285 EMERGENCY DEPT VISIT HI MDM: CPT | Mod: 25

## 2020-07-26 PROCEDURE — 25000125 ZZHC RX 250: Performed by: EMERGENCY MEDICINE

## 2020-07-26 PROCEDURE — 74176 CT ABD & PELVIS W/O CONTRAST: CPT

## 2020-07-26 PROCEDURE — 51798 US URINE CAPACITY MEASURE: CPT

## 2020-07-26 PROCEDURE — 25000132 ZZH RX MED GY IP 250 OP 250 PS 637: Performed by: EMERGENCY MEDICINE

## 2020-07-26 PROCEDURE — 25000128 H RX IP 250 OP 636: Performed by: EMERGENCY MEDICINE

## 2020-07-26 RX ORDER — MORPHINE SULFATE 4 MG/ML
INJECTION, SOLUTION INTRAMUSCULAR; INTRAVENOUS
Status: DISCONTINUED
Start: 2020-07-26 | End: 2020-07-26 | Stop reason: WASHOUT

## 2020-07-26 RX ORDER — MORPHINE SULFATE 4 MG/ML
6 INJECTION, SOLUTION INTRAMUSCULAR; INTRAVENOUS ONCE
Status: COMPLETED | OUTPATIENT
Start: 2020-07-26 | End: 2020-07-26

## 2020-07-26 RX ORDER — LIDOCAINE HYDROCHLORIDE 20 MG/ML
10 JELLY TOPICAL ONCE
Status: COMPLETED | OUTPATIENT
Start: 2020-07-26 | End: 2020-07-26

## 2020-07-26 RX ORDER — DIAZEPAM 5 MG
5 TABLET ORAL EVERY 6 HOURS PRN
Qty: 6 TABLET | Refills: 0 | Status: SHIPPED | OUTPATIENT
Start: 2020-07-26 | End: 2020-08-18

## 2020-07-26 RX ORDER — MAGNESIUM CARB/ALUMINUM HYDROX 105-160MG
296 TABLET,CHEWABLE ORAL ONCE
Status: COMPLETED | OUTPATIENT
Start: 2020-07-26 | End: 2020-07-26

## 2020-07-26 RX ORDER — DIAZEPAM 10 MG/2ML
5 INJECTION, SOLUTION INTRAMUSCULAR; INTRAVENOUS ONCE
Status: COMPLETED | OUTPATIENT
Start: 2020-07-26 | End: 2020-07-26

## 2020-07-26 RX ORDER — TAMSULOSIN HYDROCHLORIDE 0.4 MG/1
0.4 CAPSULE ORAL DAILY
Qty: 7 CAPSULE | Refills: 0 | Status: SHIPPED | OUTPATIENT
Start: 2020-07-26 | End: 2020-08-18

## 2020-07-26 RX ADMIN — LIDOCAINE HYDROCHLORIDE 10 ML: 20 JELLY TOPICAL at 14:55

## 2020-07-26 RX ADMIN — MAGNESIUM CITRATE 296 ML: 1.75 LIQUID ORAL at 11:41

## 2020-07-26 RX ADMIN — DIAZEPAM 5 MG: 5 INJECTION, SOLUTION INTRAMUSCULAR; INTRAVENOUS at 12:29

## 2020-07-26 RX ADMIN — MORPHINE SULFATE 6 MG: 4 INJECTION INTRAVENOUS at 14:51

## 2020-07-26 ASSESSMENT — ENCOUNTER SYMPTOMS
SHORTNESS OF BREATH: 0
CONSTIPATION: 1
ABDOMINAL PAIN: 1
COUGH: 0
FEVER: 0

## 2020-07-26 ASSESSMENT — MIFFLIN-ST. JEOR: SCORE: 1877.66

## 2020-07-26 NOTE — ED NOTES
"Pt assisted to ER entrance, states \"oh man, I'm going again\" referring to having a bowel movement. RN requested that pt go bathroom on the way out with RN to clean up and replace depends, and pt stated \"there's no point, I'd rather just leave\". RN again urged pt not to keep stool in his pants and to change them and clean up and offered ample assistance and supplies to help. Pt declined stating \"I just want to go wait outside, I don't want to go to the bathroom and clean up\". Awaiting ride outside, triage RN's apprised of situation so that they can offer pt help.   "

## 2020-07-26 NOTE — ED AVS SNAPSHOT
Emergency Department  [address] [place] [id number]  Phone:  [phone]  Fax:  [phone]                                    Amado Veliz   MRN: [id number]    Department:   Emergency Department   Date of Visit:  7/26/2020           After Visit Summary Signature Page    I have received my discharge instructions, and my questions have been answered. I have discussed any challenges I see with this plan with the nurse or doctor.    ..........................................................................................................................................  Patient/Patient Representative Signature      ..........................................................................................................................................  Patient Representative Print Name and Relationship to Patient    ..................................................               ................................................  Date                                   Time    ..........................................................................................................................................  Reviewed by Signature/Title    ...................................................              ..............................................  Date                                               Time          22EPIC Rev 08/18

## 2020-07-26 NOTE — ED PROVIDER NOTES
"  History     Chief Complaint:  Constipation    HPI   Amado Veliz is a 60 year old male on Aspirin with a history of CAD and hyperlipidemia who presents with constipation. The patient reports he developed constipation 2 days ago. He has taken 3 Colace a day for the past 3 days and 60 mL of Miralax with no alleviation. He reports a history of intermittent constipation following his stent placement in April, but notes this pain is different. He notes he stopped taking Protonix and Barium 2 days ago and is not on narcotics.     Allergies:  Niacin      Medications:    Aspirin   Clonazepam  Plavix  Metoprolol succinate ER  Nitroglycerin  Protonix    Past Medical History:    Unstable angina  Hemorrhage of rectum and anus  CAD   Hyperlipidemia  Intestinal infection due to Clostridium difficile   Pharyngoesophageal dysphagia     Past Surgical History:    L5-S1 disk herniation  CV coronary angiogram  CV intravascular ultrasound  CV PCI stent drug eluting x2    Family History:    DVT  Atrial Fibrillation    Social History:  Smoking status- former smoker  Alcohol use- no  Drug use- no   Marital Status:   [2]     Review of Systems   Constitutional: Negative for fever.   Respiratory: Negative for cough and shortness of breath.    Gastrointestinal: Positive for abdominal pain and constipation.   All other systems reviewed and are negative.        Physical Exam     Patient Vitals for the past 24 hrs:   BP Temp Temp src Pulse Resp SpO2 Height Weight   07/26/20 1124 133/89 98  F (36.7  C) Oral 71 18 99 % 1.88 m (6' 2\") 99.8 kg (220 lb)       Physical Exam  Vitals: reviewed by me  General: Pt seen on \[facility]\"", pleasant, cooperative, and alert to conversation, slightly anxious.  Eyes: Tracking well, clear conjunctiva BL  ENT: MMM, midline trachea.   Lungs:  No tachypnea, no accessory muscle use. No respiratory distress.   CV: Rate as above, regular rhythm.    Abd: Soft, non tender, no guarding, no rebound. Non " distended  MSK: no peripheral edema or joint effusion.  No evidence of trauma  Skin: No rash, normal turgor and temperature  Neuro: Clear speech and no facial droop.  Psych: Not RIS, no e/o AH/VH      Emergency Department Course     Imaging:  Radiology findings were communicated with the patient who voiced understanding of the findings.    CT Abdomen/Pelvis w/o Contrast:  See read     Interventions:  1141 magnesium citrate 296 mL PO    1229 Valium 5 mg IM    1451 morphine 6 mg IV    Emergency Department Course:  Past medical records, nursing notes, and vitals reviewed.    1130 I performed an exam of the patient as documented above.      The patient was sent for a CT abdomen pelvis while in the emergency department, results above.     1344 I rechecked the patient and discussed the results of his workup thus far.     Findings and plan explained to the Patient. Patient discharged home with instructions regarding supportive care, medications, and reasons to return. The importance of close follow-up was reviewed.    Impression & Plan       Medical Decision Making:  Amado Veliz is a 60 year old male who presents to the emergency room initially complaining of constipation. He has had some stool here with an enema and magnesium, but he states he does not feel fully evacuated. It is interesting that his last bowel movement was about 48 hours ago, so I do not know how much more we could get out. Regardless, because of his complaints we did do a CT scan, which shows no obstruction. He also complains of a potentially unrelated complaint of urinary retention, possible due to prostate issues and he is asking for a ball catheter, this was placed as well. Will plan for discharge home and have him follow up with urology, we also talked about stool softeners and a bowel regimen . Patient is okay with this plan, normal vital signs here, will plan for discharge.    Diagnosis:    ICD-10-CM    1. Constipation, unspecified  constipation type  K59.00    2. Urinary retention  R33.9        Disposition:  Discharged to home.    Discharge Medications:  Discharge Medication List as of 7/26/2020  4:16 PM      START taking these medications    Details   diazepam (VALIUM) 5 MG tablet Take 1 tablet (5 mg) by mouth every 6 hours as needed for anxiety or sleep, Disp-6 tablet,R-0, Local Print      tamsulosin (FLOMAX) 0.4 MG capsule Take 1 capsule (0.4 mg) by mouth daily for 7 days, Disp-7 capsule,R-0, Local Print             Scribe Disclosure:  I, Rachele Kramer, am serving as a scribe at 11:37 AM on 7/26/2020 to document services personally performed by Tres Mendoza MD based on my observations and the provider's statements to me.         Tres Mendoza MD  07/27/20 0704

## 2020-07-28 ENCOUNTER — TRANSFERRED RECORDS (OUTPATIENT)
Dept: HEALTH INFORMATION MANAGEMENT | Facility: CLINIC | Age: 60
End: 2020-07-28

## 2020-07-29 ENCOUNTER — TRANSFERRED RECORDS (OUTPATIENT)
Dept: HEALTH INFORMATION MANAGEMENT | Facility: CLINIC | Age: 60
End: 2020-07-29

## 2020-08-04 ENCOUNTER — TELEPHONE (OUTPATIENT)
Dept: CARDIOLOGY | Facility: CLINIC | Age: 60
End: 2020-08-04

## 2020-08-04 NOTE — TELEPHONE ENCOUNTER

## 2020-08-05 ENCOUNTER — OFFICE VISIT (OUTPATIENT)
Dept: CARDIOLOGY | Facility: CLINIC | Age: 60
End: 2020-08-05
Payer: COMMERCIAL

## 2020-08-05 VITALS
HEIGHT: 74 IN | SYSTOLIC BLOOD PRESSURE: 114 MMHG | HEART RATE: 60 BPM | BODY MASS INDEX: 28.75 KG/M2 | DIASTOLIC BLOOD PRESSURE: 72 MMHG | WEIGHT: 224 LBS

## 2020-08-05 DIAGNOSIS — E78.5 HYPERLIPIDEMIA LDL GOAL <70: ICD-10-CM

## 2020-08-05 DIAGNOSIS — I25.10 CORONARY ARTERY DISEASE INVOLVING NATIVE CORONARY ARTERY OF NATIVE HEART WITHOUT ANGINA PECTORIS: ICD-10-CM

## 2020-08-05 DIAGNOSIS — R07.89 ATYPICAL CHEST PAIN: Primary | ICD-10-CM

## 2020-08-05 DIAGNOSIS — R79.89 LFT ELEVATION: ICD-10-CM

## 2020-08-05 LAB
ALBUMIN SERPL-MCNC: 3.7 G/DL (ref 3.4–5)
ALP SERPL-CCNC: 58 U/L (ref 40–150)
ALT SERPL W P-5'-P-CCNC: 40 U/L (ref 0–70)
ANION GAP SERPL CALCULATED.3IONS-SCNC: 7 MMOL/L (ref 3–14)
AST SERPL W P-5'-P-CCNC: 18 U/L (ref 0–45)
BILIRUB SERPL-MCNC: 0.7 MG/DL (ref 0.2–1.3)
BUN SERPL-MCNC: 7 MG/DL (ref 7–30)
CALCIUM SERPL-MCNC: 8.9 MG/DL (ref 8.5–10.1)
CHLORIDE SERPL-SCNC: 105 MMOL/L (ref 94–109)
CHOLEST SERPL-MCNC: 176 MG/DL
CO2 SERPL-SCNC: 26 MMOL/L (ref 20–32)
CREAT SERPL-MCNC: 0.78 MG/DL (ref 0.66–1.25)
GFR SERPL CREATININE-BSD FRML MDRD: >90 ML/MIN/{1.73_M2}
GLUCOSE SERPL-MCNC: 99 MG/DL (ref 70–99)
HDLC SERPL-MCNC: 28 MG/DL
LDLC SERPL CALC-MCNC: 112 MG/DL
NONHDLC SERPL-MCNC: 148 MG/DL
POTASSIUM SERPL-SCNC: 4.1 MMOL/L (ref 3.4–5.3)
PROT SERPL-MCNC: 7.4 G/DL (ref 6.8–8.8)
SODIUM SERPL-SCNC: 138 MMOL/L (ref 133–144)
TRIGL SERPL-MCNC: 178 MG/DL

## 2020-08-05 PROCEDURE — 99214 OFFICE O/P EST MOD 30 MIN: CPT | Performed by: NURSE PRACTITIONER

## 2020-08-05 PROCEDURE — 80061 LIPID PANEL: CPT | Performed by: INTERNAL MEDICINE

## 2020-08-05 PROCEDURE — 80053 COMPREHEN METABOLIC PANEL: CPT | Performed by: INTERNAL MEDICINE

## 2020-08-05 PROCEDURE — 36415 COLL VENOUS BLD VENIPUNCTURE: CPT | Performed by: INTERNAL MEDICINE

## 2020-08-05 PROCEDURE — 93000 ELECTROCARDIOGRAM COMPLETE: CPT | Performed by: NURSE PRACTITIONER

## 2020-08-05 RX ORDER — METOPROLOL SUCCINATE 50 MG/1
TABLET, EXTENDED RELEASE ORAL
Qty: 90 TABLET | Refills: 3 | Status: SHIPPED | OUTPATIENT
Start: 2020-08-05 | End: 2020-08-20 | Stop reason: DRUGHIGH

## 2020-08-05 ASSESSMENT — MIFFLIN-ST. JEOR: SCORE: 1895.81

## 2020-08-05 NOTE — PROGRESS NOTES
Cardiology Clinic Progress Note  Amado Veliz MRN# [id number]   YOB: 1960 Age: 60 year old     Reason For Visit:  GI pain/ possible medication related.    Primary Cardiologist:   Dr. Wayne          History of Presenting Illness:      Amado Veliz is a pleasant 60 year old patient who carries a past medical history significant for coronary artery disease status post drug-eluting stent 4.0 x 16 mm Synergy to the proximal RCA (4/2020), hyperlipidemia, and remote tobacco abuse.     Over the last 2 months,  he has undergone extensive GI workup for persistent upper abdomen lower chest pain. Results concluded no significant GI cause for symptoms. He did undergo a follow up nuclear stress test on 5/21/20, negative for inducible ischemia. On 7/26/20, he presented to the ER with constipation after having barium. He subsequently underwent a CT scan that showed no obstruction. He felt moderately better after an enema and magnesium. Unfortunately, he developed urinary retention requiring a ball catheter and outpatient urology follow up. He has since been started on Flomax. Due to persistent abdominal symptoms and negative GI workup, he was advised to follow up with cardiology.      Today he reports occasional abdominal discomfort, radiating under left rib, reproducible with manual pressure. He also reports very brief twinges of chest pain with position changes. He denies shortness of breath, PND,   orthopnea, presyncope, syncope, edema, heart racing, or palpitations. He walks on a daily basis up inclines without any ischemic symptoms. However, since starting Toprol, his energy level and stamina have significantly reduced.     EKG today demonstrates sinus rafael (56) without any evidence of acute changes.   Blood pressure is well controlled at 114/72  Last BMP showed a sodium of 138, potassium 4.4, BUN 12, creatinine 0.77, and GFR > 90  Hgb 14.6 and Plt 242, denies any evidence of bleeding on  DAPT.    Last lipid panel showed a total cholesterol of 92, HDL 27, LDL 36, and . ALT / AST was noted to be elevated close to 3x above normal range. He followed up with his PCP who had him hold atorvastatin. Follow up CMP and lipid panel were done this am, results pending.     Follows a heart healthy diet. He is noted to be down ~ 8 lbs over the last month.   Remains compliant with all medications.                   Assessment and Plan:       Amado Veliz is a pleasant 60 year old patient who carries a past medical history significant for coronary artery disease status post drug-eluting stent 4.0 x 16 mm Synergy to the proximal RCA (4/2020), hyperlipidemia, and remote tobacco abuse. He continues to report abdominal upset, despite negative GI workup. EKG today showed sinus rafael w/ no acute changes. Nuclear stress test in may was negative for inducible ischemia. In review of medications, Toprol has been constant since PCI. In an attempt to see if BB may be causing his abdominal pain, I have asked him to decrease Toprol to 25 mg daily x 3 days then stop and wash out for 2 weeks. Monitor blood pressures daily with a goal of < 130/90. Should he have worsening symptoms contact office or seek ER evaluation if symptoms become severe. Continue on DAPT. Follow up with PCP regarding repeat lipid profile. Recommend restarting statin at lower dose if able. Encourage continued exercise, weight loss, and heart healthy diet.                 Thank you for allowing me to participate in this delightful patient's care. I have recommended he follow up in 1 month for reassessment of symptoms .       Janene Mckeon, APRN CNP         Review of Systems:     Review of Systems:  Skin:  Negative     Eyes:  Negative    ENT:  Negative    Respiratory:  Positive for shortness of breath  Cardiovascular:    Positive for;lightheadedness  Gastroenterology: Positive for    Genitourinary:  not assessed    Musculoskeletal:  Negative     Neurologic:  Negative    Psychiatric:  Negative    Heme/Lymph/Imm:  Negative    Endocrine:  Negative                Physical Exam:     GEN:  In general, this is a well nourished male in no acute distress.  HEENT:  Pupils equal, round. Sclerae nonicteric. Clear oropharynx. Mucous membranes moist.  NECK: Supple, no masses appreciated. Trachea midline. No JVD   C/V:  Regular rate and rhythm, no murmur, rub or gallop. No S3 or RV heave.   RESP: Respirations are unlabored. No use of accessory muscles. Clear to auscultation bilaterally without wheezing, rales, or rhonchi.  GI: Abdomen soft, nontender, nondistended. No HSM appreciated.   EXTREM: no LE edema. No cyanosis or clubbing.  NEURO: Alert and oriented, cooperative. Gait stable. No obvious focal deficits.   PSYCH: Normal affect.  SKIN: Warm and dry. No rashes or petechiae appreciated.          Past Medical History:     Past Medical History:   Diagnosis Date     CAD (coronary artery disease)     Status post stenting of proximal right coronary artery complex ruptured plaque on 23 April 2020.     Hyperlipidemia LDL goal <70      Intestinal infection due to Clostridium difficile      Pharyngoesophageal dysphagia               Past Surgical History:     Past Surgical History:   Procedure Laterality Date     BACK SURGERY      L5-S1 disk herniation      CV CORONARY ANGIOGRAM N/A 4/23/2020    Procedure: Coronary Angiogram;  Surgeon: Derek Walker MD;  Location: [facility] CARDIAC CATH LAB     CV INTRAVASULAR ULTRASOUND N/A 4/23/2020    Procedure: Intravascular Ultrasound;  Surgeon: Derek Walker MD;  Location: [facility] CARDIAC CATH LAB     CV PCI STENT DRUG ELUTING       CV PCI STENT DRUG ELUTING N/A 4/23/2020    Procedure: Percutaneous Coronary Intervention Stent Drug Eluting;  Surgeon: Derek Walker MD;  Location:  HEART CARDIAC CATH LAB              Allergies:   Niacin       Data:   All laboratory data reviewed:    LAST CHOLESTEROL:  Lab  Results   Component Value Date    CHOL 92 07/01/2020     Lab Results   Component Value Date    HDL 27 07/01/2020     Lab Results   Component Value Date    LDL 36 07/01/2020     Lab Results   Component Value Date    TRIG 144 07/01/2020     Lab Results   Component Value Date    CHOLHDLRATIO 6 10/09/2002       LAST BMP:  Lab Results   Component Value Date     07/01/2020      Lab Results   Component Value Date    POTASSIUM 4.4 07/01/2020     Lab Results   Component Value Date    CHLORIDE 108 07/01/2020     Lab Results   Component Value Date    DANIELITO 8.7 07/01/2020     Lab Results   Component Value Date    CO2 27 07/01/2020     Lab Results   Component Value Date    BUN 12 07/01/2020     Lab Results   Component Value Date    CR 0.77 07/01/2020     Lab Results   Component Value Date    GLC 92 07/01/2020       LAST CBC:  Lab Results   Component Value Date    WBC 5.0 07/01/2020     Lab Results   Component Value Date    RBC 4.75 07/01/2020     Lab Results   Component Value Date    HGB 14.6 07/01/2020     Lab Results   Component Value Date    HCT 42.9 07/01/2020     Lab Results   Component Value Date    MCV 90 07/01/2020     Lab Results   Component Value Date    MCH 30.7 07/01/2020     Lab Results   Component Value Date    MCHC 34.0 07/01/2020     Lab Results   Component Value Date    RDW 12.6 07/01/2020     Lab Results   Component Value Date     07/01/2020

## 2020-08-05 NOTE — PATIENT INSTRUCTIONS
Thanks for participating in a clinic visit with the [facility] Heart clinic today.    Reviewed images of angiogram, labs, and normal results of GI workup.   Continues with abdominal discomfort reproducible with pressure.   Worsening fatigue and energy level on Toprol  Decrease Toprol to 25mg x 3 days then stop. Washout of medication x 2 weeks and reassess symptoms in 3 weeks.   Liver enzymes elevated per last blood work. Atorvastatin held per PCP. Follow up labs today.  Blood pressures well controlled, monitor daily with a goal of < 130/90.   Continue with routine exercise, weight loss, and heart healthy diet      Follow up in 3 weeks with KEE    Please call my nurse at [phone] with any questions or concerns.    Scheduling phone number: [phone]  Reminder: Please bring in all current medications, over the counter supplements and vitamin bottles to your next appointment.

## 2020-08-05 NOTE — LETTER
8/5/2020    [facility]  [address] [facility] [place] [address]    RE: Amado Veliz       Dear Colleague,    I had the pleasure of seeing Amado Veliz in the [facility].    Cardiology Clinic Progress Note  Amado Veliz MRN# [id number]   YOB: 1960 Age: 60 year old     Reason For Visit:  GI pain/ possible medication related.    Primary Cardiologist:   Dr. Wayne          History of Presenting Illness:      Amado Veliz is a pleasant 60 year old patient who carries a past medical history significant for coronary artery disease status post drug-eluting stent 4.0 x 16 mm Synergy to the proximal RCA (4/2020), hyperlipidemia, and remote tobacco abuse.     Over the last 2 months,  he has undergone extensive GI workup for persistent upper abdomen lower chest pain. Results concluded no significant GI cause for symptoms. He did undergo a follow up nuclear stress test on 5/21/20, negative for inducible ischemia. On 7/26/20, he presented to the ER with constipation after having barium. He subsequently underwent a CT scan that showed no obstruction. He felt moderately better after an enema and magnesium. Unfortunately, he developed urinary retention requiring a ball catheter and outpatient urology follow up. He has since been started on Flomax. Due to persistent abdominal symptoms and negative GI workup, he was advised to follow up with cardiology.      Today he reports occasional abdominal discomfort, radiating under left rib, reproducible with manual pressure. He also reports very brief twinges of chest pain with position changes. He denies shortness of breath, PND,   orthopnea, presyncope, syncope, edema, heart racing, or palpitations. He walks on a daily basis up inclines without any ischemic symptoms. However, since starting Toprol, his energy level and stamina have significantly reduced.     EKG today demonstrates sinus rafael (56) without  any evidence of acute changes.   Blood pressure is well controlled at 114/72  Last BMP showed a sodium of 138, potassium 4.4, BUN 12, creatinine 0.77, and GFR > 90  Hgb 14.6 and Plt 242, denies any evidence of bleeding on DAPT.    Last lipid panel showed a total cholesterol of 92, HDL 27, LDL 36, and . ALT / AST was noted to be elevated close to 3x above normal range. He followed up with his PCP who had him hold atorvastatin. Follow up CMP and lipid panel were done this am, results pending.     Follows a heart healthy diet. He is noted to be down ~ 8 lbs over the last month.   Remains compliant with all medications.                   Assessment and Plan:       Amado Veliz is a pleasant 60 year old patient who carries a past medical history significant for coronary artery disease status post drug-eluting stent 4.0 x 16 mm Synergy to the proximal RCA (4/2020), hyperlipidemia, and remote tobacco abuse. He continues to report abdominal upset, despite negative GI workup. EKG today showed sinus rafael w/ no acute changes. Nuclear stress test in may was negative for inducible ischemia. In review of medications, Toprol has been constant since PCI. In an attempt to see if BB may be causing his abdominal pain, I have asked him to decrease Toprol to 25 mg daily x 3 days then stop and wash out for 2 weeks. Monitor blood pressures daily with a goal of < 130/90. Should he have worsening symptoms contact office or seek ER evaluation if symptoms become severe. Continue on DAPT. Follow up with PCP regarding repeat lipid profile. Recommend restarting statin at lower dose if able. Encourage continued exercise, weight loss, and heart healthy diet.                 Thank you for allowing me to participate in this delightful patient's care. I have recommended he follow up in 1 month for reassessment of symptoms .       GLORIA Paz CNP         Review of Systems:     Review of Systems:  Skin:  Negative     Eyes:   Negative    ENT:  Negative    Respiratory:  Positive for shortness of breath  Cardiovascular:    Positive for;lightheadedness  Gastroenterology: Positive for    Genitourinary:  not assessed    Musculoskeletal:  Negative    Neurologic:  Negative    Psychiatric:  Negative    Heme/Lymph/Imm:  Negative    Endocrine:  Negative                Physical Exam:     GEN:  In general, this is a well nourished male in no acute distress.  HEENT:  Pupils equal, round. Sclerae nonicteric. Clear oropharynx. Mucous membranes moist.  NECK: Supple, no masses appreciated. Trachea midline. No JVD   C/V:  Regular rate and rhythm, no murmur, rub or gallop. No S3 or RV heave.   RESP: Respirations are unlabored. No use of accessory muscles. Clear to auscultation bilaterally without wheezing, rales, or rhonchi.  GI: Abdomen soft, nontender, nondistended. No HSM appreciated.   EXTREM: no LE edema. No cyanosis or clubbing.  NEURO: Alert and oriented, cooperative. Gait stable. No obvious focal deficits.   PSYCH: Normal affect.  SKIN: Warm and dry. No rashes or petechiae appreciated.          Past Medical History:     Past Medical History:   Diagnosis Date     CAD (coronary artery disease)     Status post stenting of proximal right coronary artery complex ruptured plaque on 23 April 2020.     Hyperlipidemia LDL goal <70      Intestinal infection due to Clostridium difficile      Pharyngoesophageal dysphagia               Past Surgical History:     Past Surgical History:   Procedure Laterality Date     BACK SURGERY      L5-S1 disk herniation      CV CORONARY ANGIOGRAM N/A 4/23/2020    Procedure: Coronary Angiogram;  Surgeon: Derek Walker MD;  Location:  HEART CARDIAC CATH LAB     CV INTRAVASULAR ULTRASOUND N/A 4/23/2020    Procedure: Intravascular Ultrasound;  Surgeon: Derek Walker MD;  Location: [facility] CARDIAC CATH LAB     CV PCI STENT DRUG ELUTING       CV PCI STENT DRUG ELUTING N/A 4/23/2020    Procedure: Percutaneous  Coronary Intervention Stent Drug Eluting;  Surgeon: Derek Walker MD;  Location:  HEART CARDIAC CATH LAB              Allergies:   Niacin       Data:   All laboratory data reviewed:    LAST CHOLESTEROL:  Lab Results   Component Value Date    CHOL 92 07/01/2020     Lab Results   Component Value Date    HDL 27 07/01/2020     Lab Results   Component Value Date    LDL 36 07/01/2020     Lab Results   Component Value Date    TRIG 144 07/01/2020     Lab Results   Component Value Date    CHOLHDLRATIO 6 10/09/2002       LAST BMP:  Lab Results   Component Value Date     07/01/2020      Lab Results   Component Value Date    POTASSIUM 4.4 07/01/2020     Lab Results   Component Value Date    CHLORIDE 108 07/01/2020     Lab Results   Component Value Date    DANIELITO 8.7 07/01/2020     Lab Results   Component Value Date    CO2 27 07/01/2020     Lab Results   Component Value Date    BUN 12 07/01/2020     Lab Results   Component Value Date    CR 0.77 07/01/2020     Lab Results   Component Value Date    GLC 92 07/01/2020       LAST CBC:  Lab Results   Component Value Date    WBC 5.0 07/01/2020     Lab Results   Component Value Date    RBC 4.75 07/01/2020     Lab Results   Component Value Date    HGB 14.6 07/01/2020     Lab Results   Component Value Date    HCT 42.9 07/01/2020     Lab Results   Component Value Date    MCV 90 07/01/2020     Lab Results   Component Value Date    MCH 30.7 07/01/2020     Lab Results   Component Value Date    MCHC 34.0 07/01/2020     Lab Results   Component Value Date    RDW 12.6 07/01/2020     Lab Results   Component Value Date     07/01/2020       Thank you for allowing me to participate in the care of your patient.    Sincerely,     GLORIA Paz [facility]

## 2020-08-06 ENCOUNTER — TRANSFERRED RECORDS (OUTPATIENT)
Dept: HEALTH INFORMATION MANAGEMENT | Facility: CLINIC | Age: 60
End: 2020-08-06

## 2020-08-09 ENCOUNTER — MYC MEDICAL ADVICE (OUTPATIENT)
Dept: INTERNAL MEDICINE | Facility: CLINIC | Age: 60
End: 2020-08-09

## 2020-08-09 DIAGNOSIS — I25.10 CORONARY ARTERY DISEASE INVOLVING NATIVE CORONARY ARTERY OF NATIVE HEART, ANGINA PRESENCE UNSPECIFIED: Primary | ICD-10-CM

## 2020-08-09 DIAGNOSIS — E78.5 HYPERLIPIDEMIA LDL GOAL <70: ICD-10-CM

## 2020-08-09 DIAGNOSIS — I20.0 UNSTABLE ANGINA (H): ICD-10-CM

## 2020-08-12 ENCOUNTER — DOCUMENTATION ONLY (OUTPATIENT)
Dept: CARDIOLOGY | Facility: CLINIC | Age: 60
End: 2020-08-12

## 2020-08-12 ENCOUNTER — MYC MEDICAL ADVICE (OUTPATIENT)
Dept: CARDIOLOGY | Facility: CLINIC | Age: 60
End: 2020-08-12

## 2020-08-12 ENCOUNTER — HOSPITAL ENCOUNTER (EMERGENCY)
Facility: CLINIC | Age: 60
Discharge: HOME | End: 2020-08-12
Attending: PHYSICIAN ASSISTANT | Admitting: PHYSICIAN ASSISTANT
Payer: COMMERCIAL

## 2020-08-12 ENCOUNTER — APPOINTMENT (OUTPATIENT)
Dept: GENERAL RADIOLOGY | Facility: CLINIC | Age: 60
End: 2020-08-12
Attending: PHYSICIAN ASSISTANT
Payer: COMMERCIAL

## 2020-08-12 VITALS
HEART RATE: 56 BPM | OXYGEN SATURATION: 98 % | WEIGHT: 215 LBS | HEIGHT: 74 IN | TEMPERATURE: 97.3 F | SYSTOLIC BLOOD PRESSURE: 138 MMHG | RESPIRATION RATE: 11 BRPM | DIASTOLIC BLOOD PRESSURE: 72 MMHG | BODY MASS INDEX: 27.59 KG/M2

## 2020-08-12 DIAGNOSIS — R07.9 CHEST PAIN, UNSPECIFIED TYPE: ICD-10-CM

## 2020-08-12 LAB
ALBUMIN SERPL-MCNC: 3.5 G/DL (ref 3.4–5)
ALP SERPL-CCNC: 58 U/L (ref 40–150)
ALT SERPL W P-5'-P-CCNC: 23 U/L (ref 0–70)
ANION GAP SERPL CALCULATED.3IONS-SCNC: 3 MMOL/L (ref 3–14)
AST SERPL W P-5'-P-CCNC: 22 U/L (ref 0–45)
BASOPHILS # BLD AUTO: 0 10E9/L (ref 0–0.2)
BASOPHILS NFR BLD AUTO: 1 %
BILIRUB DIRECT SERPL-MCNC: <0.1 MG/DL (ref 0–0.2)
BILIRUB SERPL-MCNC: 0.6 MG/DL (ref 0.2–1.3)
BUN SERPL-MCNC: 10 MG/DL (ref 7–30)
CALCIUM SERPL-MCNC: 8.7 MG/DL (ref 8.5–10.1)
CHLORIDE SERPL-SCNC: 108 MMOL/L (ref 94–109)
CO2 SERPL-SCNC: 27 MMOL/L (ref 20–32)
CREAT SERPL-MCNC: 0.73 MG/DL (ref 0.66–1.25)
DIFFERENTIAL METHOD BLD: NORMAL
EOSINOPHIL # BLD AUTO: 0 10E9/L (ref 0–0.7)
EOSINOPHIL NFR BLD AUTO: 1 %
ERYTHROCYTE [DISTWIDTH] IN BLOOD BY AUTOMATED COUNT: 11.9 % (ref 10–15)
GFR SERPL CREATININE-BSD FRML MDRD: >90 ML/MIN/{1.73_M2}
GLUCOSE SERPL-MCNC: 122 MG/DL (ref 70–99)
HCT VFR BLD AUTO: 46.5 % (ref 40–53)
HGB BLD-MCNC: 15.6 G/DL (ref 13.3–17.7)
IMM GRANULOCYTES # BLD: 0 10E9/L (ref 0–0.4)
IMM GRANULOCYTES NFR BLD: 0.2 %
INTERPRETATION ECG - MUSE: NORMAL
LIPASE SERPL-CCNC: 82 U/L (ref 73–393)
LYMPHOCYTES # BLD AUTO: 1.1 10E9/L (ref 0.8–5.3)
LYMPHOCYTES NFR BLD AUTO: 26.4 %
MCH RBC QN AUTO: 30.5 PG (ref 26.5–33)
MCHC RBC AUTO-ENTMCNC: 33.5 G/DL (ref 31.5–36.5)
MCV RBC AUTO: 91 FL (ref 78–100)
MONOCYTES # BLD AUTO: 0.3 10E9/L (ref 0–1.3)
MONOCYTES NFR BLD AUTO: 8.2 %
NEUTROPHILS # BLD AUTO: 2.6 10E9/L (ref 1.6–8.3)
NEUTROPHILS NFR BLD AUTO: 63.2 %
NRBC # BLD AUTO: 0 10*3/UL
NRBC BLD AUTO-RTO: 0 /100
PLATELET # BLD AUTO: 243 10E9/L (ref 150–450)
POTASSIUM SERPL-SCNC: 4.1 MMOL/L (ref 3.4–5.3)
PROT SERPL-MCNC: 7.3 G/DL (ref 6.8–8.8)
RBC # BLD AUTO: 5.11 10E12/L (ref 4.4–5.9)
SODIUM SERPL-SCNC: 138 MMOL/L (ref 133–144)
TROPONIN I SERPL-MCNC: <0.015 UG/L (ref 0–0.04)
TROPONIN I SERPL-MCNC: <0.015 UG/L (ref 0–0.04)
WBC # BLD AUTO: 4.1 10E9/L (ref 4–11)

## 2020-08-12 PROCEDURE — 80048 BASIC METABOLIC PNL TOTAL CA: CPT | Performed by: PHYSICIAN ASSISTANT

## 2020-08-12 PROCEDURE — 85025 COMPLETE CBC W/AUTO DIFF WBC: CPT | Performed by: PHYSICIAN ASSISTANT

## 2020-08-12 PROCEDURE — 93005 ELECTROCARDIOGRAM TRACING: CPT

## 2020-08-12 PROCEDURE — 99285 EMERGENCY DEPT VISIT HI MDM: CPT | Mod: 25

## 2020-08-12 PROCEDURE — 83690 ASSAY OF LIPASE: CPT | Performed by: PHYSICIAN ASSISTANT

## 2020-08-12 PROCEDURE — 84484 ASSAY OF TROPONIN QUANT: CPT | Mod: 91 | Performed by: PHYSICIAN ASSISTANT

## 2020-08-12 PROCEDURE — 71046 X-RAY EXAM CHEST 2 VIEWS: CPT

## 2020-08-12 PROCEDURE — 80076 HEPATIC FUNCTION PANEL: CPT | Performed by: PHYSICIAN ASSISTANT

## 2020-08-12 ASSESSMENT — ENCOUNTER SYMPTOMS
NERVOUS/ANXIOUS: 1
FEVER: 0
SHORTNESS OF BREATH: 0
COUGH: 0

## 2020-08-12 ASSESSMENT — MIFFLIN-ST. JEOR: SCORE: 1854.98

## 2020-08-12 NOTE — ED PROVIDER NOTES
Emergency Department Attending Supervision Note  8/12/2020  11:46 AM      I evaluated this patient in conjunction with Vania Torre PA-C.      Briefly, the patient presented after he developed the sudden onset of sternal chest pain while watching TV last night. He then became warm in his chest and face, though this only lasted for about 15 minutes. He had no nausea or diaphoresis at that time.  He felt mildly dyspneic with this episode. This morning, the patient had a similar episode to his anxiety and chronic chest pain since his stent which felt was different from last night's symptoms.  This prompted him to call his PCP. They advised him to seek evaluation in the ED.    Patient believes that some of his symptoms may be related to his anxiety. He notes to have stopped taking his Metoprolol 3 days ago after discussion with his cardiologist.     To note, the patient is a smoker and has a history of coronary artery disease status post drug-eluting stent 4.0 x 16 mm Synergy to the proximal RCA, about 4 month ago.    On my exam,     General:   Pleasant, age appropriate.  Eyes:    Conjunctiva normal  Neck:    Supple, no meningismus.     CV:     Regular rate and rhythm.      No murmurs, rubs or gallops.       No unilateral leg swelling.       2+ radial pulses bilateral.       No lower extremity edema.  PULM:    Clear to auscultation bilateral.       No respiratory distress.      Good air exchange.     No rales or wheezing.     No stridor.  ABD:    Soft, non-tender, non-distended.       No pulsatile masses.       No rebound, guarding or rigidity.  MSK:     No gross deformity to all four extremities.   LYMPH:   No cervical lymphadenopathy.  NEURO:   Alert. Good muscle tone, no atrophy.  Skin:    Warm, dry and intact.    Psych:    Mood is good and affect is appropriate.        Results:  ECG:  ECG taken at 0914, ECG read at 0915  Normal sinus rhythm  Cannot rule out anterior infarct age undetermined   Abnormal ECG  Rate 70  bpm. NM interval 176 ms. QRS duration 102 ms. QT/QTc 396/427 ms. P-R-T axes 52 -7 52.     Imaging:  Radiology findings were communicated with the patient who voiced understanding of the findings.     XR Chest PA & LAT  IMPRESSION: No acute cardiopulmonary disease  Reading per radiology     Laboratory:  Laboratory findings were communicated with the patient who voiced understanding of the findings.     CBC: (WBC 4.1, HGB 15.6, )   BMP: Glucose 122 (H), o/w WNL (Creatinine: 0.73)     Hepatic Panel:  WNL     Lipase: 82  Troponin (Collected 0917): <0.015    ED course:  Nursing notes and vitals reviewed. 1147 I performed an exam of the patient as documented above.     IV inserted. Medicine administered as documented above. Blood drawn. This was sent to the lab for further testing, results above.    The patient was sent for a chest x-ray while in the emergency department, findings above.     Patient was discharged home.     Patient went was evaluated for ACS.  EKG reveals no  ischemic changes nor dysrhythmia.  Troponin is within normal limits.  Delta troponin undertaken and also unremarkable.  His novel chest pain began yesterday thus does not require further serial enzymes or provocative testing from the ED.  He continues to have his baseline level of chronic chest pain this morning but yesterday's events were the primary concern today.  In the absence of novel chest pain/dyspnea, no concerns for pulmonary embolus, aortic dissection or aortic aneurysm.  We did discuss case with his primary cardiologist who will closely follow-up the patient as an outpatient.  Patient safe for discharge home.      Diagnosis    ICD-10-CM    1. Chest pain, unspecified type  R07.9 Troponin I (now)         Scribe Disclosure:  Preethi EAST, am serving as a scribe on 8/12/2020 at 11:47 AM to personally document services performed by Luke Bhakta MD, based on my observations and the provider's statements to me.           Luke Bhakta MD  08/12/20 [id number]

## 2020-08-12 NOTE — ED AVS SNAPSHOT
[facility] Emergency Department  [address]  [facility] [address]  Phone:  [phone]  Fax:  [phone]                                    Amado Veliz   MRN: [id number]    Department:  [facility] Emergency Department   Date of Visit:  8/12/2020           After Visit Summary Signature Page    I have received my discharge instructions, and my questions have been answered. I have discussed any challenges I see with this plan with the nurse or doctor.    ..........................................................................................................................................  Patient/Patient Representative Signature      ..........................................................................................................................................  Patient Representative Print Name and Relationship to Patient    ..................................................               ................................................  Date                                   Time    ..........................................................................................................................................  Reviewed by Signature/Title    ...................................................              ..............................................  Date                                               Time          22EPIC Rev 08/18

## 2020-08-12 NOTE — PROGRESS NOTES
Received call from Dr. Torre at [facility] ER to inform me of patient presenting to the ER with a 15 min episode of chest pain. He reported symptoms were different from when he had his PCI. Troponin remains flat and no acute changes on EKG. Reviewed recent office visit with Dr. Torre with plan for patient to wash out of metoprolol in an attempt to see if BB was causing GI symptoms. Patient has held BB since 8/9/20. Plan to do 1 additional troponin, if flat, patient will be discharged with close follow up. Reviewed the above with Dr. Wayne who will plan to follow up with him next week. Scheduling to call patient with appointment date and time.

## 2020-08-12 NOTE — TELEPHONE ENCOUNTER
Called pt to assess c/o chest pain & chest tightness.He states he feels he might be having panic attacks as his wife is up at the cabin & he is alone here, concerned that if he needed medical attention he would not have ant help. He states he also felt his heart pounding w/ the chest pain/tightness. He states the pain lasted about 15 minutes, came on out of the blue, he was just resting, about to go to bed. He states he did feel it was a little harder to breath, denies nausea. He states he & his wife have been going on 2 mile walk daily without him getting chest pain. Pt advised to go to the ED or see Dr. Wayne today to be evaluated. Pt states he will go to the ED. Elia VANG

## 2020-08-12 NOTE — DISCHARGE INSTRUCTIONS
*No new medications. Continue your current medications.    *Follow-up with your cardiologist closely.   *Return if you develop concerning chest pain, difficulty in breathing, faint or feel like you will faint or become worse in any way.       Discharge Instructions  Chest Pain    You have been seen today for chest pain or discomfort.  At this time, your provider has found no signs that your chest pain is due to a serious or life-threatening condition, (or you have declined more testing and/or admission to the hospital). However, sometimes there is a serious problem that does not show up right away. Your evaluation today may not be complete and you may need further testing and evaluation.     Generally, every Emergency Department visit should have a follow-up clinic visit with either a primary or a specialty clinic/provider. Please follow-up as instructed by your emergency provider today.  Return to the Emergency Department if:  Your chest pain changes, gets worse, starts to happen more often, or comes with less activity.  You are newly short of breath.  You get very weak or tired.  You pass out or faint.  You have any new symptoms, like fever, cough, numb legs, or you cough up blood.  You have anything else that worries you.    Until you follow-up with your regular provider, please do the following:  Take one aspirin daily unless you have an allergy or are told not to by your provider.  If a stress test appointment has been made, go to the appointment.  If you have questions, contact your regular provider.  Follow-up with your regular provider/clinic as directed; this is very important.    If you were given a prescription for medicine here today, be sure to read all of the information (including the package insert) that comes with your prescription.  This will include important information about the medicine, its side effects, and any warnings that you need to know about.  The pharmacist who fills the prescription  can provide more information and answer questions you may have about the medicine.  If you have questions or concerns that the pharmacist cannot address, please call or return to the Emergency Department.       Remember that you can always come back to the Emergency Department if you are not able to see your regular provider in the amount of time listed above, if you get any new symptoms, or if there is anything that worries you.

## 2020-08-12 NOTE — ED TRIAGE NOTES
Pt had 15 minute episode of chest pain last evening.  It redeveloped  again this AM.   Pt was watching TV when pain had sudden onset.

## 2020-08-12 NOTE — ED PROVIDER NOTES
History     Chief Complaint:  Chest Pain    HPI  Amado Veliz is a 60 year old male with a with a past medical history significant for coronary artery disease status post drug-eluting stent to the proximal RCA (4/2020), hyperlipidemia, and remote tobacco abuse. He presents for evaluation of chest pain. He was sitting down watching TV when he had sudden onset of pain in his sternal chest. He then developed a warmth in his chest and face which began to resolve after 15 minutes. He denies any nausea or diaphoresis with these symptoms. The pain returned this morning after he woke up and the patient contacted his primary care physician this morning who then referred him to the ED. Here the patient reports feeling anxious about the chest pain and believes that part of his pain this morning could be due to his anxiety over his medical issues. Of note the patient stopped taking his metoprolol 3 days ago per instructions from cardiology.       Allergies:  Niacin    Medications:    Aspirin  Clonazepam  Plavix  Metoprolol  Nitroglycerin    Past Medical History:    CAD  Intestinal C. diff infection   Hemorrhage of rectum and anus  Mixed hyperlipidemia  Unstable angina  Pharyngoesophageal dysphagia    Past Surgical History:    Back surgery  Percutaneous Coronary Intervention Stent Drug Eluting    Family History:    Atrial fibrillation in his father  Deep Vein Thrombosis in his father and sister    Social History:  The patient arrives alone  Smoking: former  Alcohol use: no  PCP: Ruddy Hoffman  Marital Status:  [2]      Review of Systems   Constitutional: Negative for fever.   Respiratory: Negative for cough and shortness of breath.    Cardiovascular: Positive for chest pain.   Psychiatric/Behavioral: The patient is nervous/anxious.    All other systems reviewed and are negative.      Physical Exam     Patient Vitals for the past 24 hrs:   BP Temp Temp src Pulse Heart Rate Resp SpO2 Height Weight  "  08/12/20 1150 138/72 -- -- -- 58 11 98 % -- --   08/12/20 1140 -- -- -- -- 63 11 99 % -- --   08/12/20 1130 132/79 -- -- 56 57 14 98 % -- --   08/12/20 1120 117/71 -- -- -- 61 15 97 % -- --   08/12/20 0930 116/74 -- -- 65 63 14 98 % -- --   08/12/20 0915 130/81 -- -- -- -- -- -- -- --   08/12/20 0901 (!) 145/88 97.3  F (36.3  C) Temporal -- 72 20 99 % 1.88 m (6' 2\") 97.5 kg (215 lb)     Physical Exam  General: Alert, interactive. GCS 15  Head:  Scalp is atraumatic.  Eyes:  EOM intact. The pupils are equal, round, and reactive to light. No scleral icterus.   ENT:                                      Ears:  The external ears are normal. TM's non-erythematous. External canals normal.    Nose:  The external nose is normal.  Throat:  The oropharynx is normal. Mucus membranes are moist.                 Neck:  Normal range of motion. There is no rigidity.   CV:  Regular rate and rhythm. No murmur. 2+ radial pulses  Resp:  Breath sounds are clear bilaterally. Non-labored, no retractions or accessory muscle use.  GI:  Abdomen is soft, no distension, no tenderness.   MS:  Normal range of motion.   Skin:  Warm and dry.   Neuro:  Strength and sensation grossly intact.   Psych:  Awake. Alert.  Appropriate interactions.     Emergency Department Course     ECG:  ECG taken at 0914, ECG read at 0915  Normal sinus rhythm  Cannot rule out anterior infarct age undetermined   Abnormal ECG  Rate 70 bpm. OH interval 176 ms. QRS duration 102 ms. QT/QTc 396/427 ms. P-R-T axes 52 -7 52.    Imaging:  Radiology findings were communicated with the patient who voiced understanding of the findings.    XR Chest PA & LAT  IMPRESSION: No acute cardiopulmonary disease  Reading per radiology    Laboratory:  Laboratory findings were communicated with the patient who voiced understanding of the findings.    CBC: (WBC 4.1, HGB 15.6, )   BMP: Glucose 122 (H), o/w WNL (Creatinine: 0.73)    Hepatic Panel:  WNL    Lipase: 82  Troponin (Collected " 0917): <0.015  Troponin (Collected 1122): <0.015    Emergency Department Course:  Past medical records, nursing notes, and vitals reviewed.  0907: I performed an exam of the patient and obtained history, as documented above.     IV was inserted and blood was drawn for laboratory testing, results above.    The patient was sent for an XR while in the emergency department, findings above    1054: I rechecked the patient. Explained findings to patient.    I shared service with Dr. Babin on this patient, see their note for further details.     I personally reviewed the laboratory and imaging results with the Patient and answered all related questions prior to discharge.     Findings and plan explained to the Patient. Patient discharged home with instructions regarding supportive care, medications, and reasons to return. The importance of close follow-up was reviewed.   Impression & Plan      Medical Decision Making:  Amado Veliz is a 60 year old male with medical history including coronary artery disease status post drug-eluting stent to the proximal RCA (4/2020 (, hyperlipidemia, and remote tobacco abuse who presents to the emergency department today for evaluation of chest pain.  Patient had an episode of chest pain last night while watching TV.  This lasted approximately 15 minutes, there is no other associated symptoms.  He admits that anxiety may be contributing to symptoms as he has had increased anxiety since stent placed.  He follows closely with cardiology.  EKG with no acute ischemic changes.  Initial and 2-hour delta troponin negative.  Certainly, cardiac process is of concern in this patient with his history of coronary artery disease.  Given the negative work-up in the emergency department, I believe he is safe for discharge home and close follow-up with his cardiologist.  I did discuss plan with Janene Sotelo NP, of cardiology, who agrees with plan.  The cardiology clinic will call and schedule  an appointment with the patient.  Given the patient's normal vitals and no pleuritic chest pain, I doubt pulmonary embolism.  Chest x-ray with no evidence of pneumonia, pneumothorax, effusion, or other acute findings.      The patient remained chest pain-free and hemodynamically stable.  He agrees with plan for discharge home and close follow-up with cardiology.  He understands reasons to return including worsening chest pain, shortness of breath, or any other new/concerning symptoms.  The patient agrees with this plan all questions and concerns addressed prior to discharge home.    Diagnosis:    ICD-10-CM   1. Chest pain, unspecified type  R07.9       Disposition:   Discharged to home with close follow up with cardiology    Discharge Medications:  None     Scribe Disclosure:  Nina EAST, am serving as a scribe at 9:07 AM on 8/12/2020 to document services personally performed by Vania Torre PA-C based on my observations and the provider's statements to me.    [facility] EMERGENCY DEPARTMENT     Vania Torre PA-C  08/12/20 9082

## 2020-08-13 RX ORDER — ATORVASTATIN CALCIUM 40 MG/1
20 TABLET, FILM COATED ORAL EVERY EVENING
Qty: 45 TABLET | Refills: 0
Start: 2020-08-13 | End: 2020-10-26

## 2020-08-13 NOTE — TELEPHONE ENCOUNTER
Sent patient a Squrl message with reply.   Advised resuming atorvastatin at 20 mg daily and rechecking labs in two months.

## 2020-08-19 ENCOUNTER — TELEPHONE (OUTPATIENT)
Dept: CARDIOLOGY | Facility: CLINIC | Age: 60
End: 2020-08-19

## 2020-08-20 ENCOUNTER — TELEPHONE (OUTPATIENT)
Dept: CARDIOLOGY | Facility: CLINIC | Age: 60
End: 2020-08-20

## 2020-08-20 DIAGNOSIS — R07.89 ATYPICAL CHEST PAIN: Primary | ICD-10-CM

## 2020-08-20 RX ORDER — COLCHICINE 0.6 MG/1
0.6 CAPSULE ORAL 2 TIMES DAILY
Qty: 60 CAPSULE | Refills: 0 | Status: SHIPPED | OUTPATIENT
Start: 2020-08-20 | End: 2020-10-14

## 2020-08-20 RX ORDER — METOPROLOL SUCCINATE 25 MG/1
25 TABLET, EXTENDED RELEASE ORAL DAILY
Qty: 90 TABLET | Refills: 1 | Status: SHIPPED | OUTPATIENT
Start: 2020-08-20 | End: 2020-10-14

## 2020-08-20 NOTE — TELEPHONE ENCOUNTER
Reviewed earlier phone call of questions and concerns with Dr. Wayne. Recommendations of restart Metoprolol (Toprol) 25 mg daily. Take the Aleve 400 mg q 4 hours today and tomorrow(8/21/20). On Saturday, 8/22/20 start Mitigare 0.6 mg twice a day. We will check on how he is doing in 2 weeks. If improving, will continue for 2 more weeks(total of 4 weeks). If no signs of improvement, will stop Mitigare after 2 weeks.   Is to continue taking the Lipitor while taking the Mitigare as it is short term usage.

## 2020-08-20 NOTE — TELEPHONE ENCOUNTER
Call received. Concerned about taking Aleve and the potential of bleeding. Has not had any issues with Bleeding, however he is aware that can be a side effect. Re instructed that per Dr. Wayne, he wanted him to take 400 mg of Aleve every 4 hours for 2-3 days. Admitted that he has only been taking it 2 times per day, but did agree to take it  3 times per day.     Also questioning if we had sent in a script for Mitagare instead of the colchicine as colchicine is not covered by his insurance, but Mitigare is.  But is concerned about taking Lipitor with the Mitagare as the pharmacist informed him that the two have seaious interactions. Will again review with Dr. Wayne.    Informed us he is experiencing an increae in anxiety since stoping the Metoprolol. The Metoprolol was stopped on 8/9/20 to see if this improved his chest pain or abdominal pain, which being off it did not improve the pain.  Stated he was not asking to be put back on the Metoprolol, however if he needed to he was willing, maybe at a lower dose.   Had been on Toprol 50 mg daily.     Will review above with Dr. Wayne and get back to him. Agreed with plan.

## 2020-09-03 ENCOUNTER — OFFICE VISIT (OUTPATIENT)
Dept: INTERNAL MEDICINE | Facility: CLINIC | Age: 60
End: 2020-09-03
Payer: COMMERCIAL

## 2020-09-03 VITALS
WEIGHT: 224 LBS | OXYGEN SATURATION: 98 % | RESPIRATION RATE: 16 BRPM | BODY MASS INDEX: 28.76 KG/M2 | SYSTOLIC BLOOD PRESSURE: 111 MMHG | HEART RATE: 68 BPM | DIASTOLIC BLOOD PRESSURE: 73 MMHG

## 2020-09-03 DIAGNOSIS — E78.5 HYPERLIPIDEMIA LDL GOAL <70: ICD-10-CM

## 2020-09-03 DIAGNOSIS — F43.0 ACUTE REACTION TO STRESS: ICD-10-CM

## 2020-09-03 DIAGNOSIS — R07.89 ATYPICAL CHEST PAIN: Primary | ICD-10-CM

## 2020-09-03 PROCEDURE — 99214 OFFICE O/P EST MOD 30 MIN: CPT | Performed by: INTERNAL MEDICINE

## 2020-09-03 NOTE — PROGRESS NOTES
Subjective   1545--1620    Amado Veliz is a 60 year old male who presents to clinic today for health counseling regarding several health issues.  35 minutes were spent with the patient face to face today, over 50% of which was devoted to health counseling and education regarding these issues.     Patient is being seen for an follow up.  HPI       Hyperlipidemia Follow-Up      Are you regularly taking any medication or supplement to lower your cholesterol?   Yes- Atorvastain    Are you having muscle aches or other side effects that you think could be caused by your cholesterol lowering medication?  No    Hypertension Follow-up      Do you check your blood pressure regularly outside of the clinic? Yes     Are you following a low salt diet? Yes    Are your blood pressures ever more than 140 on the top number (systolic) OR more   than 90 on the bottom number (diastolic), for example 140/90? No      How many servings of fruits and vegetables do you eat daily?  4 or more    On average, how many sweetened beverages do you drink each day (Examples: soda, juice, sweet tea, etc.  Do NOT count diet or artificially sweetened beverages)?   0    How many days per week do you exercise enough to make your heart beat faster? 6    How many minutes a day do you exercise enough to make your heart beat faster? 30 - 60    How many days per week do you miss taking your medication? 0      He continues to be bothered with episodes of chest discomfort.  He presented to the emergency department on August 12 with a sense of chest pressure or tightness.  He felt a warm sensation in his chest that spread to his face, remitting after about 15 minutes.  ED evaluation showed no evidence of cardiac injury.    He continues to have episodes of chest pain with activities like taking a deep breath, yawning, or posture or position changes.    He was seen by cardiology on August 18 and was placed on colchicine twice daily.  He notes that this seems  "to have helped with the severity of these episodes of anterior chest discomfort.  He is also noted that ibuprofen helped when taken for about 3 days, however, he is been advised to try to avoid nonsteroidal medications as much as possible due to his cardiac condition and his use of platelet inhibiting medications.    He is beginning to understand that anxiety is playing a role in his symptoms.  In addition to concerns during the pandemic in general, he is working on a project in [place] and is working with a \""[employer] contractor will not finish on time\".  Additionally, he is trying to sell a lake home, and finally, his mother-in-law is in a care facility which he fears raises her risk of COVID-19 and other health issues.    The patient's wife has started on citalopram, and he does note that she does seem less anxious.  He agrees that at times there anxiety level might \"feet off of\" about 1 another, as I mentioned is common in relationships.  The patient is not quite ready to start an SSRI medication but is becoming more open to trying this in the future if needed.  He notes that he has taken clonazepam on 3 separate episodes, once this morning, noting some benefit for his anxiety as well as some of these other symptoms.    He believes that for now he requires no further GI clinic follow up or diagnostic testing.     He is due to see Janene Fragoso, cardiology nurse practitioner, this coming Tuesday.    We discussed some small changes in his medication regimen that have been made.  We agreed at this time not to make any further medication changes.    The patient required a reduction in dosing of atorvastatin several weeks ago due to significant elevation in his LFTs.  He is tolerating his current, lower dose of atorvastatin.  We agreed to update his lipid panel and LFTs in a few weeks.    Past medical, family and social histories as well as medications reviewed and updated as needed.    Review of Systems   REVIEW OF " SYSTEMS: The following systems have been completely reviewed and are negative except as noted above:   Constitutional, HEENT, respiratory, cardiovascular, gastrointestinal, musculoskeletal, psychiatric, and neurologic systems.      Vitals: /73   Pulse 68   Resp 16   Wt 101.6 kg (224 lb)   SpO2 98%   BMI 28.76 kg/m    BMI= Body mass index is 28.76 kg/m .     Physical Exam   GENERAL: healthy, alert and no distress  NECK: no adenopathy, no asymmetry, masses, or scars and thyroid normal to palpation  RESP: lungs clear to auscultation - no rales, rhonchi or wheezes  CV: regular rate and rhythm, normal S1 S2, no S3 or S4, no murmur, click or rub, no peripheral edema and peripheral pulses strong  ABDOMEN: soft, nontender, no hepatosplenomegaly, no masses and bowel sounds normal  MS: no gross musculoskeletal defects noted, no edema        Assessment & Plan    Health counseling regarding several health issues.  35 minutes were spent with the patient face to face today, over 50% of which was devoted to health counseling and education regarding these issues.   (R07.89) Atypical chest pain  (primary encounter diagnosis)  Comment: No recent evidence of significant coronary or cardiac injury.   Some recent medication changes have been helpful.   Plan: Continue current meds.     (F43.0) Acute reaction to stress  Comment: Continue clonazepam, used infrequently for abrupt anxiety particularly associated with physical symptoms.     (E78.5) Hyperlipidemia LDL goal <70  Comment: Tolerating current dose of atorvastatin. Recheck lipids in a few weeks to assure LDL-Cholesterol satisfactorily controlled without significant LFT elevation.        Patient Instructions   Continue with current meds.     Table the idea of adding citalopram at this time.     Allow for any changes that Janene or cardiologist recommend.     See me after the next cholesterol/liver tests, in late-October or early November.       Return in about 7 weeks  (around 10/22/2020).    Ildefonso Lucas MD,  [facility]

## 2020-09-03 NOTE — PATIENT INSTRUCTIONS
Continue with current meds.     Table the idea of adding citalopram at this time.     Allow for any changes that Janene or cardiologist recommend.     See me after the next cholesterol/liver tests, in late-October or early November.

## 2020-09-04 NOTE — TELEPHONE ENCOUNTER
Patient called in as requested for a progress report. He states that he is noticing 'little by little' improvement with the pain. He rates it a 1/10. He is pleased with the progress.I told him to continue with the Mitigare for another two weeks as previously instructed and to call us at that point. He agreed to do so.

## 2020-09-08 ENCOUNTER — TELEPHONE (OUTPATIENT)
Dept: INTERNAL MEDICINE | Facility: CLINIC | Age: 60
End: 2020-09-08

## 2020-09-08 ENCOUNTER — OFFICE VISIT (OUTPATIENT)
Dept: CARDIOLOGY | Facility: CLINIC | Age: 60
End: 2020-09-08
Payer: COMMERCIAL

## 2020-09-08 VITALS
WEIGHT: 223 LBS | OXYGEN SATURATION: 99 % | BODY MASS INDEX: 28.62 KG/M2 | HEART RATE: 67 BPM | DIASTOLIC BLOOD PRESSURE: 72 MMHG | HEIGHT: 74 IN | SYSTOLIC BLOOD PRESSURE: 118 MMHG

## 2020-09-08 DIAGNOSIS — R07.89 ATYPICAL CHEST PAIN: Primary | ICD-10-CM

## 2020-09-08 DIAGNOSIS — F41.9 ANXIETY: Primary | ICD-10-CM

## 2020-09-08 DIAGNOSIS — I25.10 CORONARY ARTERY DISEASE INVOLVING NATIVE CORONARY ARTERY OF NATIVE HEART WITHOUT ANGINA PECTORIS: ICD-10-CM

## 2020-09-08 DIAGNOSIS — F41.9 ANXIETY: ICD-10-CM

## 2020-09-08 DIAGNOSIS — E78.2 MIXED HYPERLIPIDEMIA: ICD-10-CM

## 2020-09-08 PROCEDURE — 99214 OFFICE O/P EST MOD 30 MIN: CPT | Performed by: NURSE PRACTITIONER

## 2020-09-08 RX ORDER — BUSPIRONE HYDROCHLORIDE 15 MG/1
15 TABLET ORAL 2 TIMES DAILY
Qty: 60 TABLET | Refills: 3 | Status: SHIPPED | OUTPATIENT
Start: 2020-09-08 | End: 2020-09-11 | Stop reason: SINTOL

## 2020-09-08 ASSESSMENT — ANXIETY QUESTIONNAIRES
2. NOT BEING ABLE TO STOP OR CONTROL WORRYING: NEARLY EVERY DAY
IF YOU CHECKED OFF ANY PROBLEMS ON THIS QUESTIONNAIRE, HOW DIFFICULT HAVE THESE PROBLEMS MADE IT FOR YOU TO DO YOUR WORK, TAKE CARE OF THINGS AT HOME, OR GET ALONG WITH OTHER PEOPLE: VERY DIFFICULT
3. WORRYING TOO MUCH ABOUT DIFFERENT THINGS: NEARLY EVERY DAY
GAD7 TOTAL SCORE: 13
1. FEELING NERVOUS, ANXIOUS, OR ON EDGE: NEARLY EVERY DAY
6. BECOMING EASILY ANNOYED OR IRRITABLE: SEVERAL DAYS
5. BEING SO RESTLESS THAT IT IS HARD TO SIT STILL: NOT AT ALL
7. FEELING AFRAID AS IF SOMETHING AWFUL MIGHT HAPPEN: NOT AT ALL

## 2020-09-08 ASSESSMENT — MIFFLIN-ST. JEOR: SCORE: 1891.52

## 2020-09-08 ASSESSMENT — PATIENT HEALTH QUESTIONNAIRE - PHQ9
5. POOR APPETITE OR OVEREATING: NEARLY EVERY DAY
SUM OF ALL RESPONSES TO PHQ QUESTIONS 1-9: 5

## 2020-09-08 NOTE — PATIENT INSTRUCTIONS
Thanks for participating in a office visit with the [facility] today.    Moderate relief with colchicine, continue for 1 additional week then stop.   Continue on all other medications.  Continue with exercise and heart healthy diet.   Encourage follow up with PCP regarding anxiety management.       Follow up in 1 month  with KEE     Please call my nurse at [phone] with any questions or concerns.    Scheduling phone number: [phone]  Reminder: Please bring in all current medications, over the counter supplements and vitamin bottles to your next appointment.

## 2020-09-08 NOTE — TELEPHONE ENCOUNTER
"Patient has prescription for Clonazepam as needed for anxiety.     Per Dr. Lucas's 9/3/2020 office visit note:   \"The patient's wife has started on citalopram, and he does note that she does seem less anxious.  He agrees that at times there anxiety level might \"feet off of\" about 1 another, as I mentioned is common in relationships.  The patient is not quite ready to start an SSRI medication but is becoming more open to trying this in the future if needed.  He notes that he has taken clonazepam on 3 separate episodes, once this morning, noting some benefit for his anxiety as well as some of these other symptoms.\"    Left voice message for patient, informed him Dr. Lucas is out of the office for 2 weeks. Asked patient if he is wanting a daily medication to treat anxiety, if he has ever tried any daily medication before and if he had, what was the reason it was stopped (reaction, didn't work, etc).   Advised patient we would send the information to the covering provider, but they may request he schedule a virtual appointment to discuss anxiety.  "

## 2020-09-08 NOTE — LETTER
9/8/2020    Ildefonso Lucas MD, MD  [address] [facility] [address]  [facility] [address]    RE: Amado Veliz       Dear Colleague,    I had the pleasure of seeing Amado Veliz in the [facility].    Cardiology Clinic Progress Note  Amado Veliz MRN# [id number]   YOB: 1960 Age: 60 year old     Reason For Visit:  Follow up   Primary Cardiologist:   Dr. Wayne          History of Presenting Illness:      Amado Veliz is a pleasant 60 year old patient who carries a past medical history significant for coronary artery disease status post drug-eluting stent 4.0 x 16 mm Synergy to the proximal RCA (4/2020), hyperlipidemia, and remote tobacco abuse.     He was last seen on 8/18/20 in follow up to a ER visit for recurrent chest pain.  ER workup was negative for ACS and symptoms likely related to anxiety. Last  nuclear stress test on 5/21/20, negative for inducible ischemia. He also underwent an extensive GI workup showing no significant abnormalities.  At his last office visit, he continued to complain of chest wall pain, worse when he bends over, yawns, or lies on left side.  He was started on a course of colchicine and ibuprofen and returns to the office today for follow-up.    He continues to report chest discomfort, although improved after the initiation of colchicine and restarting of metoprolol.  He admits he is under a great deal of stress which exacerbates his anxiety.  He takes both Valium and Klonopin with moderate improvement.  He states he was recently seen by his PCP and was not ready to start on daily anxiety medication at that time.  However, today he states he would like to reconsider.     He denies shortness of breath, orthopnea, presyncope, syncope, edema, heart racing, or palpitations. He walks 2 miles on a daily basis without any ischemic symptoms.    Blood pressure is well controlled at 118/72, managed well on Toprol  Last BMP showed a sodium of  138, potassium 4.1, BUN 10, creatinine 0.73, GFR greater than 90  Hemoglobin 15.6 and platelet 243, denies any evidence of bleeding on Plavix and aspirin  Recent lipid panel showed a total cholesterol of 176, HDL 28, , triglycerides 178. With recommendation to repeat FLP in 2 months     Follows a heart healthy diet.  Remains compliant with all medications.                   Assessment and Plan:       Amado Veliz is a pleasant 60 year old patient who carries a past medical history significant for coronary artery disease status post drug-eluting stent 4.0 x 16 mm Synergy to the proximal RCA (4/2020), hyperlipidemia, and remote tobacco abuse.  He reports a moderate improvement after starting on colchicine and restarting metoprolol.  I will continue with colchicine for 1 additional week then stop.  He is to monitor his symptoms closely.  Should he have a return of chest wall discomfort, consider a total 3-month course of colchicine.  Blood pressure is well controlled, continue on current medical therapy.  Last lipid profile showed an increase in LDL, we will plan for a fasting lipid panel in 2 months.  Recommend he continue with his current exercise regimen and heart healthy diet.  He has had multiple visits to the emergency room with chest-like discomfort, negative work-up, as well as a negative follow-up stress test and GI work-up.  I believe the majority of his symptoms are likely anxiety driven as he admits he is under a great deal of personal stress.  I have advised him to follow-up with his primary care physician for management of his anxiety.              Thank you for allowing me to participate in this delightful patient's care. I have recommended he follow up in 1 month for reassessment of symptoms .       Janene Mckeon, APRN CNP         Review of Systems:     Review of Systems:  Skin:  Negative     Eyes:  Negative    ENT:  Negative    Respiratory:  Negative    Cardiovascular:    Positive  for;lightheadedness;chest pain  Gastroenterology: Positive for    Genitourinary:  Negative    Musculoskeletal:  Negative    Neurologic:  Negative    Psychiatric:  Positive for anxiety  Heme/Lymph/Imm:  Negative    Endocrine:  Negative                Physical Exam:     GEN:  In general, this is a well nourished male in no acute distress.  HEENT:  Pupils equal, round. Sclerae nonicteric. Clear oropharynx. Mucous membranes moist.  NECK: Supple, no masses appreciated. Trachea midline. No JVD   C/V:  Regular rate and rhythm, no murmur, rub or gallop. No S3 or RV heave.   RESP: Respirations are unlabored. No use of accessory muscles. Clear to auscultation bilaterally without wheezing, rales, or rhonchi.  GI: Abdomen soft, nontender, nondistended. No HSM appreciated.   EXTREM: no LE edema. No cyanosis or clubbing.  NEURO: Alert and oriented, cooperative. Gait stable. No obvious focal deficits.   PSYCH: Normal affect.  SKIN: Warm and dry. No rashes or petechiae appreciated.          Past Medical History:     Past Medical History:   Diagnosis Date     CAD (coronary artery disease)     Status post stenting of proximal right coronary artery complex ruptured plaque on 23 April 2020.     Hyperlipidemia LDL goal <70      Intestinal infection due to Clostridium difficile      Pharyngoesophageal dysphagia               Past Surgical History:     Past Surgical History:   Procedure Laterality Date     BACK SURGERY      L5-S1 disk herniation      CV CORONARY ANGIOGRAM N/A 4/23/2020    Procedure: Coronary Angiogram;  Surgeon: Derek Walker MD;  Location: [facility] CARDIAC CATH LAB     CV INTRAVASULAR ULTRASOUND N/A 4/23/2020    Procedure: Intravascular Ultrasound;  Surgeon: Derek Walker MD;  Location: [facility] CARDIAC CATH LAB     CV PCI STENT DRUG ELUTING       CV PCI STENT DRUG ELUTING N/A 4/23/2020    Procedure: Percutaneous Coronary Intervention Stent Drug Eluting;  Surgeon: Derek Walker MD;  [facility]              Allergies:   Niacin       Data:   All laboratory data reviewed:    LAST CHOLESTEROL:  Lab Results   Component Value Date    CHOL 92 07/01/2020     Lab Results   Component Value Date    HDL 27 07/01/2020     Lab Results   Component Value Date    LDL 36 07/01/2020     Lab Results   Component Value Date    TRIG 144 07/01/2020     Lab Results   Component Value Date    CHOLHDLRATIO 6 10/09/2002       LAST BMP:  Lab Results   Component Value Date     07/01/2020      Lab Results   Component Value Date    POTASSIUM 4.4 07/01/2020     Lab Results   Component Value Date    CHLORIDE 108 07/01/2020     Lab Results   Component Value Date    DANIELITO 8.7 07/01/2020     Lab Results   Component Value Date    CO2 27 07/01/2020     Lab Results   Component Value Date    BUN 12 07/01/2020     Lab Results   Component Value Date    CR 0.77 07/01/2020     Lab Results   Component Value Date    GLC 92 07/01/2020       LAST CBC:  Lab Results   Component Value Date    WBC 5.0 07/01/2020     Lab Results   Component Value Date    RBC 4.75 07/01/2020     Lab Results   Component Value Date    HGB 14.6 07/01/2020     Lab Results   Component Value Date    HCT 42.9 07/01/2020     Lab Results   Component Value Date    MCV 90 07/01/2020     Lab Results   Component Value Date    MCH 30.7 07/01/2020     Lab Results   Component Value Date    MCHC 34.0 07/01/2020     Lab Results   Component Value Date    RDW 12.6 07/01/2020     Lab Results   Component Value Date     07/01/2020             Thank you for allowing me to participate in the care of your patient.      Sincerely,     GLORIA Paz CNP     [facility]    cc:   [facility] [address] [place] [address]

## 2020-09-08 NOTE — LETTER
9/8/2020    Ildefonso Lucas MD, MD  [address] [facility] [address]  [facility] [address]    RE: Amado Veliz       Dear Colleague,    I had the pleasure of seeing Amado Veliz in the [facility].    Cardiology Clinic Progress Note  Amado Veliz MRN# [id number]   YOB: 1960 Age: 60 year old     Reason For Visit:  Follow up   Primary Cardiologist:   Dr. Wayne          History of Presenting Illness:      Amado Veliz is a pleasant 60 year old patient who carries a past medical history significant for coronary artery disease status post drug-eluting stent 4.0 x 16 mm Synergy to the proximal RCA (4/2020), hyperlipidemia, and remote tobacco abuse.     He was last seen on 8/18/20 in follow up to a ER visit for recurrent chest pain.  ER workup was negative for ACS and symptoms likely related to anxiety. Last  nuclear stress test on 5/21/20, negative for inducible ischemia. He also underwent an extensive GI workup showing no significant abnormalities.  At his last office visit, he continued to complain of chest wall pain, worse when he bends over, yawns, or lies on left side.  He was started on a course of colchicine and ibuprofen and returns to the office today for follow-up.    He continues to report chest discomfort, although improved after the initiation of colchicine and restarting of metoprolol.  He admits he is under a great deal of stress which exacerbates his anxiety.  He takes both Valium and Klonopin with moderate improvement.  He states he was recently seen by his PCP and was not ready to start on daily anxiety medication at that time.  However, today he states he would like to reconsider.     He denies shortness of breath, orthopnea, presyncope, syncope, edema, heart racing, or palpitations. He walks 2 miles on a daily basis without any ischemic symptoms.    Blood pressure is well controlled at 118/72, managed well on Toprol  Last BMP showed a sodium of  138, potassium 4.1, BUN 10, creatinine 0.73, GFR greater than 90  Hemoglobin 15.6 and platelet 243, denies any evidence of bleeding on Plavix and aspirin  Recent lipid panel showed a total cholesterol of 176, HDL 28, , triglycerides 178. With recommendation to repeat FLP in 2 months     Follows a heart healthy diet.  Remains compliant with all medications.                   Assessment and Plan:       Amado Veliz is a pleasant 60 year old patient who carries a past medical history significant for coronary artery disease status post drug-eluting stent 4.0 x 16 mm Synergy to the proximal RCA (4/2020), hyperlipidemia, and remote tobacco abuse.  He reports a moderate improvement after starting on colchicine and restarting metoprolol.  I will continue with colchicine for 1 additional week then stop.  He is to monitor his symptoms closely.  Should he have a return of chest wall discomfort, consider a total 3-month course of colchicine.  Blood pressure is well controlled, continue on current medical therapy.  Last lipid profile showed an increase in LDL, we will plan for a fasting lipid panel in 2 months.  Recommend he continue with his current exercise regimen and heart healthy diet.  He has had multiple visits to the emergency room with chest-like discomfort, negative work-up, as well as a negative follow-up stress test and GI work-up.  I believe the majority of his symptoms are likely anxiety driven as he admits he is under a great deal of personal stress.  I have advised him to follow-up with his primary care physician for management of his anxiety.              Thank you for allowing me to participate in this delightful patient's care. I have recommended he follow up in 1 month for reassessment of symptoms .       Janene Mckeon, APRN CNP         Review of Systems:     Review of Systems:  Skin:  Negative     Eyes:  Negative    ENT:  Negative    Respiratory:  Negative    Cardiovascular:    Positive  for;lightheadedness;chest pain  Gastroenterology: Positive for    Genitourinary:  Negative    Musculoskeletal:  Negative    Neurologic:  Negative    Psychiatric:  Positive for anxiety  Heme/Lymph/Imm:  Negative    Endocrine:  Negative                Physical Exam:     GEN:  In general, this is a well nourished male in no acute distress.  HEENT:  Pupils equal, round. Sclerae nonicteric. Clear oropharynx. Mucous membranes moist.  NECK: Supple, no masses appreciated. Trachea midline. No JVD   C/V:  Regular rate and rhythm, no murmur, rub or gallop. No S3 or RV heave.   RESP: Respirations are unlabored. No use of accessory muscles. Clear to auscultation bilaterally without wheezing, rales, or rhonchi.  GI: Abdomen soft, nontender, nondistended. No HSM appreciated.   EXTREM: no LE edema. No cyanosis or clubbing.  NEURO: Alert and oriented, cooperative. Gait stable. No obvious focal deficits.   PSYCH: Normal affect.  SKIN: Warm and dry. No rashes or petechiae appreciated.          Past Medical History:     Past Medical History:   Diagnosis Date     CAD (coronary artery disease)     Status post stenting of proximal right coronary artery complex ruptured plaque on 23 April 2020.     Hyperlipidemia LDL goal <70      Intestinal infection due to Clostridium difficile      Pharyngoesophageal dysphagia             Past Surgical History:     Past Surgical History:   Procedure Laterality Date     BACK SURGERY      L5-S1 disk herniation      CV CORONARY ANGIOGRAM N/A 4/23/2020    Procedure: Coronary Angiogram;  Surgeon: Derek Walker MD;  Location:  [facility] CARDIAC CATH [facility]     CV INTRAVASULAR ULTRASOUND N/A 4/23/2020    Procedure: Intravascular Ultrasound;  Surgeon: Derek Walker MD;  Location:  [facility] CARDIAC CATH LAB     CV PCI STENT DRUG ELUTING       CV PCI STENT DRUG ELUTING N/A 4/23/2020    Procedure: Percutaneous Coronary Intervention Stent Drug Eluting;  Surgeon: Derek Walker MD;  Location:   [facility] CARDIAC CATH LAB            Allergies:   Niacin       Data:   All laboratory data reviewed:    LAST CHOLESTEROL:  Lab Results   Component Value Date    CHOL 92 07/01/2020     Lab Results   Component Value Date    HDL 27 07/01/2020     Lab Results   Component Value Date    LDL 36 07/01/2020     Lab Results   Component Value Date    TRIG 144 07/01/2020     Lab Results   Component Value Date    CHOLHDLRATIO 6 10/09/2002       LAST BMP:  Lab Results   Component Value Date     07/01/2020      Lab Results   Component Value Date    POTASSIUM 4.4 07/01/2020     Lab Results   Component Value Date    CHLORIDE 108 07/01/2020     Lab Results   Component Value Date    DANIELITO 8.7 07/01/2020     Lab Results   Component Value Date    CO2 27 07/01/2020     Lab Results   Component Value Date    BUN 12 07/01/2020     Lab Results   Component Value Date    CR 0.77 07/01/2020     Lab Results   Component Value Date    GLC 92 07/01/2020       LAST CBC:  Lab Results   Component Value Date    WBC 5.0 07/01/2020     Lab Results   Component Value Date    RBC 4.75 07/01/2020     Lab Results   Component Value Date    HGB 14.6 07/01/2020     Lab Results   Component Value Date    HCT 42.9 07/01/2020     Lab Results   Component Value Date    MCV 90 07/01/2020     Lab Results   Component Value Date    MCH 30.7 07/01/2020     Lab Results   Component Value Date    MCHC 34.0 07/01/2020     Lab Results   Component Value Date    RDW 12.6 07/01/2020     Lab Results   Component Value Date     07/01/2020     Thank you for allowing me to participate in the care of your patient.    Sincerely,     GLORIA Paz CNP     [facility]    cc:   [facility] [address] [place] [address]

## 2020-09-08 NOTE — TELEPHONE ENCOUNTER
Routed to covering provider - Kay Gonsales, to review.     Please see Dr. Lucas's 9/3/2020 office visit notes regarding anxiety. Patient requesting medication today.

## 2020-09-08 NOTE — TELEPHONE ENCOUNTER
Patient calling  He wants to start a anxiety pill. Patient has a bad weekend. He is leaving for a trip tomorrow  Patient uses [facility] in [place] off [address]  Ok to call and  [phone]

## 2020-09-08 NOTE — PROGRESS NOTES
Cardiology Clinic Progress Note  Amado Veliz MRN# [id number]   YOB: 1960 Age: 60 year old     Reason For Visit:  Follow up   Primary Cardiologist:   Dr. Wayne          History of Presenting Illness:      Amado Veliz is a pleasant 60 year old patient who carries a past medical history significant for coronary artery disease status post drug-eluting stent 4.0 x 16 mm Synergy to the proximal RCA (4/2020), hyperlipidemia, and remote tobacco abuse.     He was last seen on 8/18/20 in follow up to a ER visit for recurrent chest pain.  ER workup was negative for ACS and symptoms likely related to anxiety. Last  nuclear stress test on 5/21/20, negative for inducible ischemia. He also underwent an extensive GI workup showing no significant abnormalities.  At his last office visit, he continued to complain of chest wall pain, worse when he bends over, yawns, or lies on left side.  He was started on a course of colchicine and ibuprofen and returns to the office today for follow-up.    He continues to report chest discomfort, although improved after the initiation of colchicine and restarting of metoprolol.  He admits he is under a great deal of stress which exacerbates his anxiety.  He takes both Valium and Klonopin with moderate improvement.  He states he was recently seen by his PCP and was not ready to start on daily anxiety medication at that time.  However, today he states he would like to reconsider.     He denies shortness of breath, orthopnea, presyncope, syncope, edema, heart racing, or palpitations. He walks 2 miles on a daily basis without any ischemic symptoms.    Blood pressure is well controlled at 118/72, managed well on Toprol  Last BMP showed a sodium of 138, potassium 4.1, BUN 10, creatinine 0.73, GFR greater than 90  Hemoglobin 15.6 and platelet 243, denies any evidence of bleeding on Plavix and aspirin  Recent lipid panel showed a total cholesterol of 176, HDL 28, ,  triglycerides 178. With recommendation to repeat FLP in 2 months     Follows a heart healthy diet.  Remains compliant with all medications.                   Assessment and Plan:       Amado Veliz is a pleasant 60 year old patient who carries a past medical history significant for coronary artery disease status post drug-eluting stent 4.0 x 16 mm Synergy to the proximal RCA (4/2020), hyperlipidemia, and remote tobacco abuse.  He reports a moderate improvement after starting on colchicine and restarting metoprolol.  I will continue with colchicine for 1 additional week then stop.  He is to monitor his symptoms closely.  Should he have a return of chest wall discomfort, consider a total 3-month course of colchicine.  Blood pressure is well controlled, continue on current medical therapy.  Last lipid profile showed an increase in LDL, we will plan for a fasting lipid panel in 2 months.  Recommend he continue with his current exercise regimen and heart healthy diet.  He has had multiple visits to the emergency room with chest-like discomfort, negative work-up, as well as a negative follow-up stress test and GI work-up.  I believe the majority of his symptoms are likely anxiety driven as he admits he is under a great deal of personal stress.  I have advised him to follow-up with his primary care physician for management of his anxiety.              Thank you for allowing me to participate in this delightful patient's care. I have recommended he follow up in 1 month for reassessment of symptoms .       GLORIA Paz CNP         Review of Systems:     Review of Systems:  Skin:  Negative     Eyes:  Negative    ENT:  Negative    Respiratory:  Negative    Cardiovascular:    Positive for;lightheadedness;chest pain  Gastroenterology: Positive for    Genitourinary:  Negative    Musculoskeletal:  Negative    Neurologic:  Negative    Psychiatric:  Positive for anxiety  Heme/Lymph/Imm:  Negative    Endocrine:   Negative                Physical Exam:     GEN:  In general, this is a well nourished male in no acute distress.  HEENT:  Pupils equal, round. Sclerae nonicteric. Clear oropharynx. Mucous membranes moist.  NECK: Supple, no masses appreciated. Trachea midline. No JVD   C/V:  Regular rate and rhythm, no murmur, rub or gallop. No S3 or RV heave.   RESP: Respirations are unlabored. No use of accessory muscles. Clear to auscultation bilaterally without wheezing, rales, or rhonchi.  GI: Abdomen soft, nontender, nondistended. No HSM appreciated.   EXTREM: no LE edema. No cyanosis or clubbing.  NEURO: Alert and oriented, cooperative. Gait stable. No obvious focal deficits.   PSYCH: Normal affect.  SKIN: Warm and dry. No rashes or petechiae appreciated.          Past Medical History:     Past Medical History:   Diagnosis Date     CAD (coronary artery disease)     Status post stenting of proximal right coronary artery complex ruptured plaque on 23 April 2020.     Hyperlipidemia LDL goal <70      Intestinal infection due to Clostridium difficile      Pharyngoesophageal dysphagia               Past Surgical History:     Past Surgical History:   Procedure Laterality Date     BACK SURGERY      L5-S1 disk herniation      CV CORONARY ANGIOGRAM N/A 4/23/2020    Procedure: Coronary Angiogram;  Surgeon: Derek Walker MD;  Location: [facility] CARDIAC CATH LAB     CV INTRAVASULAR ULTRASOUND N/A 4/23/2020    Procedure: Intravascular Ultrasound;  Surgeon: Derek Walker MD;  Location: [facility] CARDIAC CATH LAB     CV PCI STENT DRUG ELUTING       CV PCI STENT DRUG ELUTING N/A 4/23/2020    Procedure: Percutaneous Coronary Intervention Stent Drug Eluting;  Surgeon: Derek Walker MD;  Location:  HEART CARDIAC CATH LAB              Allergies:   Niacin       Data:   All laboratory data reviewed:    LAST CHOLESTEROL:  Lab Results   Component Value Date    CHOL 92 07/01/2020     Lab Results   Component Value Date    HDL  27 07/01/2020     Lab Results   Component Value Date    LDL 36 07/01/2020     Lab Results   Component Value Date    TRIG 144 07/01/2020     Lab Results   Component Value Date    CHOLHDLRATIO 6 10/09/2002       LAST BMP:  Lab Results   Component Value Date     07/01/2020      Lab Results   Component Value Date    POTASSIUM 4.4 07/01/2020     Lab Results   Component Value Date    CHLORIDE 108 07/01/2020     Lab Results   Component Value Date    DANIELITO 8.7 07/01/2020     Lab Results   Component Value Date    CO2 27 07/01/2020     Lab Results   Component Value Date    BUN 12 07/01/2020     Lab Results   Component Value Date    CR 0.77 07/01/2020     Lab Results   Component Value Date    GLC 92 07/01/2020       LAST CBC:  Lab Results   Component Value Date    WBC 5.0 07/01/2020     Lab Results   Component Value Date    RBC 4.75 07/01/2020     Lab Results   Component Value Date    HGB 14.6 07/01/2020     Lab Results   Component Value Date    HCT 42.9 07/01/2020     Lab Results   Component Value Date    MCV 90 07/01/2020     Lab Results   Component Value Date    MCH 30.7 07/01/2020     Lab Results   Component Value Date    MCHC 34.0 07/01/2020     Lab Results   Component Value Date    RDW 12.6 07/01/2020     Lab Results   Component Value Date     07/01/2020

## 2020-09-08 NOTE — TELEPHONE ENCOUNTER
Patient calls back and had anxiety twice over the weekend and had to take and has had to go to the ED previously thinking he had heart issues as well.  Patient spoke with Cardiology who thought he should contact PCP to go on a daily medication for anxiety.  Patient states he was trying to avoid taking daily medication, but is ready now.  Please address.  Patient is asking for something to be sent in today because he is going out of town tomorrow and would like to take with him.

## 2020-09-08 NOTE — TELEPHONE ENCOUNTER
Contacted    He did discuss anxiety medication with Cardiologist and she suggested an SSRI may be a good option for him. Informed patient that Buspirone is not a SSRI and asked if he wanted to request this be changed based on the cardiologist's suggestion. Patient states he will try the Buspirone to see if this helps. PHQ-9 and TAJ-7 completed by phone.     Patient will provide update in a few weeks on how he is doing.

## 2020-09-09 ASSESSMENT — ANXIETY QUESTIONNAIRES: GAD7 TOTAL SCORE: 13

## 2020-09-09 NOTE — TELEPHONE ENCOUNTER
Patient is taking this medication but it makes him feel dizzy. He is still having panic attacks. He is ok with staying on the meds but wanted to know if this was normal. Ok to call and  [phone]

## 2020-09-11 RX ORDER — CITALOPRAM HYDROBROMIDE 10 MG/1
10 TABLET ORAL DAILY
Qty: 30 TABLET | Refills: 0 | Status: SHIPPED | OUTPATIENT
Start: 2020-09-11 | End: 2020-10-15

## 2020-09-11 NOTE — TELEPHONE ENCOUNTER
Contacted patient. Advised okay to stop Buspirone and discontinued this from medication list.     He would like to try Citalopram as long as it okay with his other medications. Requesting a call back with update on medication.

## 2020-09-11 NOTE — TELEPHONE ENCOUNTER
Contacted patient, informed him prescription sent to pharmacy for Citalopram 10 mg daily, provider did not see any concerning interactions with his other medications. Follow-up in 1 month with Dr. Lucas - patient has appointment scheduled on 10/23/2020, advised patient he can do follow-up at that time. Advised patient to call with any side effects. Patient verbalizes understanding.

## 2020-09-11 NOTE — TELEPHONE ENCOUNTER
Covering provider for Dr. Lucas.  He can stop BuSpar.  Ask him if he is willing to try citalopram.    Regina Carter MD

## 2020-09-11 NOTE — TELEPHONE ENCOUNTER
10 mg citalopram sent to pharmacy.  Advised patient follow-up with PCP in a month's time.  Advised to call if he develops any side effects.  I am not seeing any concerning interactions with his regular medications.    Regina Carter MD

## 2020-09-11 NOTE — TELEPHONE ENCOUNTER
Patient calls to advise he is not taking the Buspar.  After taking three times it was making things worse rather than better.  Patient states it made him dizzy and his heart race.    Patient is requesting a different medication that he can take daily for anxiety that will make him feel normal without side effects.  Please address.

## 2020-10-02 ENCOUNTER — TELEPHONE (OUTPATIENT)
Dept: CARDIOLOGY | Facility: CLINIC | Age: 60
End: 2020-10-02

## 2020-10-02 ENCOUNTER — TELEPHONE (OUTPATIENT)
Dept: INTERNAL MEDICINE | Facility: CLINIC | Age: 60
End: 2020-10-02

## 2020-10-02 DIAGNOSIS — F41.9 ANXIETY: ICD-10-CM

## 2020-10-02 NOTE — TELEPHONE ENCOUNTER
Routed to provider to review if dose can be adjusted. Patient has future appointment scheduled on 10/23/2020.

## 2020-10-02 NOTE — TELEPHONE ENCOUNTER
Patient is calling because he started citalopram and he doesn't think it is really helping him at all. He also says it is not helping his anxiety at all. He is wondering if he needs to take this medication more often or maybe a higher dose.

## 2020-10-02 NOTE — TELEPHONE ENCOUNTER
Received call from pt stating that the tightness in his chest never really went away & asking if he should go back on the colchicine. He states when he yawn, leans over to pick something off the floor he gets the tightness in his chest lasting a few seconds after he stands up again. He also states he cannot lie on his left side at night at all or on the right for very long at night without getting the tightness. He has been having to sleep on his back. Will message Janene Mckeon NP to review. Elia VANG

## 2020-10-05 NOTE — TELEPHONE ENCOUNTER
Pt called back, informed of recommendations from Janene Mckeon NP. Pt advised to call Dr. Lucas office tomorrow. Elia VANG

## 2020-10-05 NOTE — TELEPHONE ENCOUNTER
Per last office visit, patient had minimal relief of symptoms despite the continuation of colchicine. Do not recommend restarting colchicine in the absence of symptoms improvement. Recommend continuing current medical therapy. Follow up with PCP regarding non cardiac causes of chest wall pain.

## 2020-10-05 NOTE — TELEPHONE ENCOUNTER
Attempted to call pt with recommendations from Janene Mckeon NP, left message for pt to call back. Elia VANG

## 2020-10-08 NOTE — TELEPHONE ENCOUNTER
Patient calling and would like to increase the medication or take a different medication. He would rather not wait until he is seen on 10/23/2020

## 2020-10-14 ENCOUNTER — VIRTUAL VISIT (OUTPATIENT)
Dept: CARDIOLOGY | Facility: CLINIC | Age: 60
End: 2020-10-14
Attending: NURSE PRACTITIONER
Payer: COMMERCIAL

## 2020-10-14 DIAGNOSIS — I25.10 CORONARY ARTERY DISEASE INVOLVING NATIVE CORONARY ARTERY OF NATIVE HEART WITHOUT ANGINA PECTORIS: ICD-10-CM

## 2020-10-14 DIAGNOSIS — R07.89 ATYPICAL CHEST PAIN: ICD-10-CM

## 2020-10-14 DIAGNOSIS — F41.9 ANXIETY: ICD-10-CM

## 2020-10-14 PROCEDURE — 99214 OFFICE O/P EST MOD 30 MIN: CPT | Mod: 95 | Performed by: NURSE PRACTITIONER

## 2020-10-14 RX ORDER — METOPROLOL SUCCINATE 25 MG/1
12.5 TABLET, EXTENDED RELEASE ORAL DAILY
Qty: 90 TABLET | Refills: 1
Start: 2020-10-14 | End: 2021-03-26

## 2020-10-14 RX ORDER — CITALOPRAM HYDROBROMIDE 10 MG/1
10 TABLET ORAL DAILY
Qty: 30 TABLET | Refills: 0 | Status: CANCELLED | OUTPATIENT
Start: 2020-10-14

## 2020-10-14 NOTE — PROGRESS NOTES
CARDIOLOGY CLINIC VIDEO VISIT    Amado Veliz is a 60 year old male who is being evaluated via a billable video visit.      The patient has been notified of following:     This video visit will be conducted via a call between you and your physician/provider. We have found that certain health care needs can be provided without the need for an in-person physical exam.  This service lets us provide the care you need with a video conversation.  If a prescription is necessary we can send it directly to your pharmacy.  If lab work is needed we can place an order for that and you can then stop by our lab to have the test done at a later time.    Virtual visits are billed at different rates depending on your insurance coverage. During this emergency period, for some insurers they may be billed the same as an in-person visit.  Please reach out to your insurance provider with any questions.    If during the course of the call the physician/provider feels a video visit is not appropriate, you will not be charged for this service.      Physician has received verbal consent for a Video Visit from the patient? Yes    Patient would like the video invitation sent by: Video/ Annie      I have reviewed and updated the patient's Past Medical History, Social History, Family History and Medication List.    MEDICATIONS:  Current Outpatient Medications   Medication Sig Dispense Refill     aspirin (ASA) 81 MG EC tablet Take 1 tablet (81 mg) by mouth daily 90 tablet 3     atorvastatin (LIPITOR) 40 MG tablet Take 0.5 tablets (20 mg) by mouth every evening 45 tablet 0     citalopram (CELEXA) 10 MG tablet Take 1 tablet (10 mg) by mouth daily 30 tablet 0     clonazePAM (KLONOPIN) 0.5 MG tablet Take 1 tablet (0.5 mg) by mouth 2 times daily as needed for anxiety (Use infrequently for more acute anxiety) 20 tablet 0     clopidogrel (PLAVIX) 75 MG tablet Take 1 tablet (75 mg) by mouth daily 90 tablet 3     colchicine (MITIGARE) 0.6 MG capsule  Take 1 capsule (0.6 mg) by mouth 2 times daily 60 capsule 0     diazepam (VALIUM) 5 MG tablet Take 5 mg by mouth every 6 hours as needed for anxiety       metoprolol succinate ER (TOPROL-XL) 25 MG 24 hr tablet Take 1 tablet (25 mg) by mouth daily 90 tablet 1     nitroGLYcerin (NITROSTAT) 0.4 MG sublingual tablet For chest pain place 1 tablet under the tongue every 5 minutes for 3 doses. If symptoms persist 5 minutes after 1st dose call 911. (Patient not taking: Reported on 9/8/2020) 30 tablet 0       ALLERGIES  Niacin    Review Of Systems:     Skin: negative  Eyes: negative  Ears/Nose/Throat: negative  Respiratory: No shortness of breath, dyspnea on exertion, cough, or hemoptysis  Cardiovascular: chest pain  Gastrointestinal: constipation  Genitourinary: negative  Musculoskeletal: negative  Neurologic: headaches  Psychiatric: sleep disturbance and anxiety  Hematologic/Lymphatic/Immunologic: negative  Endocrine: negative      Self reported vitals:  Weight:211  /65  HR 56    SHowley CMA    Brief physical exam:  General: In no acute distress, upright and calm.  Eyes: No apparent redness or discharge.   Chest: No labored breathing, no cough during exam or audible wheezing.   Neuro: No obvious focal defects or tremors.   Psych: Alert and oriented. Does not appear anxious.    The rest of a comprehensive physical examination is deferred due to public health emergency video visit restrictions.     Primary cardiologist: Dr. Wayne      HPI:  Amado Veliz is a pleasant 60 year old patient who carries a past medical history significant for coronary artery disease status post drug-eluting stent 4.0 x 16 mm Synergy to the proximal RCA (4/2020), hyperlipidemia, anxiety and remote tobacco abuse.     Over the last several months, he continues to report chest wall pain, primarily triggered by increased anxiety.  He states he will also notice some chest discomfort with yawning and bending over.  He also admits to some  localized areas where he is able to reproduce the pain with manual pressure.  This has become very unsettling for him as he is concerned that the symptoms are cardiac related.  Unfortunately, he has presented to the emergency room on numerous occasions, resulting in negative cardiac work-up.  On an outpatient basis, he has had extensive cardiac work-up post PCI consisting of a negative nuclear stress test, GI work-up, medication adjustments as well as beta-blocker washout, and course of colchicine/ibuprofen (which did show mild improvement).  He has followed with his PCP for management of his anxiety and is now on daily citalopram.  Of note, he did report a significant decrease in anxiety with the combination of citalopram and clonazepam.  However, he does not want to take clonazepam regularly as he is fearful this can become habit forming.  He has follow-up with his PCP next week to discuss further management of his anxiety.    Blood pressure today is well controlled at 116/65, heart rate 56.  He does note heart rate will drop in the upper 40s and low 50s, accompanied by brief positional lightheadedness.  He remains on long-acting metoprolol 25 mg daily.    He offers no other cardiac complaint, denies shortness of breath, palpitations, PND, orthopnea, presyncope, syncope, or lower extremity edema.  He does admit to chronic sleep disturbance.    He remains active with walking regularly  Compliant with all medications.      Assessment/Plan:  Amado Veliz is a pleasant 60 year old patient who carries a past medical history significant for coronary artery disease status post drug-eluting stent 4.0 x 16 mm Synergy to the proximal RCA (4/2020), hyperlipidemia, anxiety and remote tobacco abuse.  He continues to have persistent chest wall pain, worse with increased anxiety, but also noticeable with yawning and bending over.  We had a long discussion regarding the multiple medication changes made over the past few  months without symptom improvement, significant diagnostic cardiac work-up showing no new evidence of ischemia or inflammation, negative GI work-up, and need for good anxiety management.  I will review with Dr. Wayne, consideration for possible cardiac MRI.  Due to his borderline low heart rate, I will have him decrease Toprol to 12.5 mg daily.  Continue to monitor blood pressures daily with a goal of less than 130/90.  He is scheduled to follow-up with his PCP next week regarding anxiety management.        Follow up plan: More recommendations following discussion with Dr. Wayne.    Video-Visit Details    Type of service:  Video Visit    Video Start Time: 08:01  Video End Time: 8:29    Originating Location (pt. Location): Home    Distant Location (provider location):  [facility]- Home Office    Platform used for Video Visit: GLORIA Roberson, CNP  Pager [phone]  10/14/2020

## 2020-10-14 NOTE — LETTER
10/14/2020    Ildefonso Lucas MD, MD  [address] [facility] [address]  [facility] [address]    RE: Amado Veliz       Dear Colleague,    I had the pleasure of seeing Amado Veliz in the [facility].    CARDIOLOGY CLINIC VIDEO VISIT    Amado Veliz is a 60 year old male who is being evaluated via a billable video visit.      The patient has been notified of following:     This video visit will be conducted via a call between you and your physician/provider. We have found that certain health care needs can be provided without the need for an in-person physical exam.  This service lets us provide the care you need with a video conversation.  If a prescription is necessary we can send it directly to your pharmacy.  If lab work is needed we can place an order for that and you can then stop by our lab to have the test done at a later time.    Virtual visits are billed at different rates depending on your insurance coverage. During this emergency period, for some insurers they may be billed the same as an in-person visit.  Please reach out to your insurance provider with any questions.    If during the course of the call the physician/provider feels a video visit is not appropriate, you will not be charged for this service.      Physician has received verbal consent for a Video Visit from the patient? Yes    Patient would like the video invitation sent by: Loy/ Annie      I have reviewed and updated the patient's Past Medical History, Social History, Family History and Medication List.    MEDICATIONS:  Current Outpatient Medications   Medication Sig Dispense Refill     aspirin (ASA) 81 MG EC tablet Take 1 tablet (81 mg) by mouth daily 90 tablet 3     atorvastatin (LIPITOR) 40 MG tablet Take 0.5 tablets (20 mg) by mouth every evening 45 tablet 0     citalopram (CELEXA) 10 MG tablet Take 1 tablet (10 mg) by mouth daily 30 tablet 0     clonazePAM (KLONOPIN) 0.5 MG tablet Take 1 tablet  (0.5 mg) by mouth 2 times daily as needed for anxiety (Use infrequently for more acute anxiety) 20 tablet 0     clopidogrel (PLAVIX) 75 MG tablet Take 1 tablet (75 mg) by mouth daily 90 tablet 3     colchicine (MITIGARE) 0.6 MG capsule Take 1 capsule (0.6 mg) by mouth 2 times daily 60 capsule 0     diazepam (VALIUM) 5 MG tablet Take 5 mg by mouth every 6 hours as needed for anxiety       metoprolol succinate ER (TOPROL-XL) 25 MG 24 hr tablet Take 1 tablet (25 mg) by mouth daily 90 tablet 1     nitroGLYcerin (NITROSTAT) 0.4 MG sublingual tablet For chest pain place 1 tablet under the tongue every 5 minutes for 3 doses. If symptoms persist 5 minutes after 1st dose call 911. (Patient not taking: Reported on 9/8/2020) 30 tablet 0       ALLERGIES  Niacin    Review Of Systems:     Skin: negative  Eyes: negative  Ears/Nose/Throat: negative  Respiratory: No shortness of breath, dyspnea on exertion, cough, or hemoptysis  Cardiovascular: chest pain  Gastrointestinal: constipation  Genitourinary: negative  Musculoskeletal: negative  Neurologic: headaches  Psychiatric: sleep disturbance and anxiety  Hematologic/Lymphatic/Immunologic: negative  Endocrine: negative      Self reported vitals:  Weight:211  /65  HR 56    SHowley CMA    Brief physical exam:  General: In no acute distress, upright and calm.  Eyes: No apparent redness or discharge.   Chest: No labored breathing, no cough during exam or audible wheezing.   Neuro: No obvious focal defects or tremors.   Psych: Alert and oriented. Does not appear anxious.    The rest of a comprehensive physical examination is deferred due to public health emergency video visit restrictions.     Primary cardiologist: Dr. Wayne      HPI:  Amado Veliz is a pleasant 60 year old patient who carries a past medical history significant for coronary artery disease status post drug-eluting stent 4.0 x 16 mm Synergy to the proximal RCA (4/2020), hyperlipidemia, anxiety and remote tobacco  abuse.     Over the last several months, he continues to report chest wall pain, primarily triggered by increased anxiety.  He states he will also notice some chest discomfort with yawning and bending over.  He also admits to some localized areas where he is able to reproduce the pain with manual pressure.  This has become very unsettling for him as he is concerned that the symptoms are cardiac related.  Unfortunately, he has presented to the emergency room on numerous occasions, resulting in negative cardiac work-up.  On an outpatient basis, he has had extensive cardiac work-up post PCI consisting of a negative nuclear stress test, GI work-up, medication adjustments as well as beta-blocker washout, and course of colchicine/ibuprofen (which did show mild improvement).  He has followed with his PCP for management of his anxiety and is now on daily citalopram.  Of note, he did report a significant decrease in anxiety with the combination of citalopram and clonazepam.  However, he does not want to take clonazepam regularly as he is fearful this can become habit forming.  He has follow-up with his PCP next week to discuss further management of his anxiety.    Blood pressure today is well controlled at 116/65, heart rate 56.  He does note heart rate will drop in the upper 40s and low 50s, accompanied by brief positional lightheadedness.  He remains on long-acting metoprolol 25 mg daily.    He offers no other cardiac complaint, denies shortness of breath, palpitations, PND, orthopnea, presyncope, syncope, or lower extremity edema.  He does admit to chronic sleep disturbance.    He remains active with walking regularly  Compliant with all medications.      Assessment/Plan:  Amado Veliz is a pleasant 60 year old patient who carries a past medical history significant for coronary artery disease status post drug-eluting stent 4.0 x 16 mm Synergy to the proximal RCA (4/2020), hyperlipidemia, anxiety and remote tobacco  abuse.  He continues to have persistent chest wall pain, worse with increased anxiety, but also noticeable with yawning and bending over.  We had a long discussion regarding the multiple medication changes made over the past few months without symptom improvement, significant diagnostic cardiac work-up showing no new evidence of ischemia or inflammation, negative GI work-up, and need for good anxiety management.  I will review with Dr. Wayne, consideration for possible cardiac MRI.  Due to his borderline low heart rate, I will have him decrease Toprol to 12.5 mg daily.  Continue to monitor blood pressures daily with a goal of less than 130/90.  He is scheduled to follow-up with his PCP next week regarding anxiety management.        Follow up plan: More recommendations following discussion with Dr. Wayne.    Video-Visit Details    Type of service:  Video Visit    Video Start Time: 08:01  Video End Time: 8:29    Originating Location (pt. Location): Home    Distant Location (provider location):  [facility]- Home Office    Platform used for Video Visit: GLORIA Roberson, NAOMY  Pager [phone]  10/14/2020       Thank you for allowing me to participate in the care of your patient.    Sincerely,     GLORIA Paz CNP     [facility]

## 2020-10-14 NOTE — PATIENT INSTRUCTIONS
Thanks for participating in a video visit with the [facility] today.    You have reported persistent chest wall pain, improved with anxiety management.   Heart rate borderline low, occasional episodes of positional lightheadedness  Decrease Toprol to 12.5 mg daily and monitor symptoms.   Monitor blood pressures and heart rates daily. Goal < 130/90  Follow up with PCP regarding anxiety management  I will review with Dr. Wayne possible cardiac MRI, our office will call you with recommendations.     Please call my nurse at [phone] with any questions or concerns.    Scheduling phone number: [phone]  Reminder: Please bring in all current medications, over the counter supplements and vitamin bottles to your next appointment.

## 2020-10-15 ENCOUNTER — DOCUMENTATION ONLY (OUTPATIENT)
Dept: CARDIOLOGY | Facility: CLINIC | Age: 60
End: 2020-10-15

## 2020-10-15 DIAGNOSIS — R07.9 CHEST PAIN, UNSPECIFIED TYPE: Primary | ICD-10-CM

## 2020-10-15 RX ORDER — CITALOPRAM HYDROBROMIDE 20 MG/1
20 TABLET ORAL DAILY
Qty: 30 TABLET | Refills: 2 | Status: SHIPPED | OUTPATIENT
Start: 2020-10-15 | End: 2020-12-15

## 2020-10-15 RX ORDER — PREDNISONE 20 MG/1
20 TABLET ORAL DAILY
Qty: 5 TABLET | Refills: 0 | Status: SHIPPED | OUTPATIENT
Start: 2020-10-15 | End: 2020-10-27

## 2020-10-15 NOTE — TELEPHONE ENCOUNTER
Sent patient a [employer] message and a refill for higher dose of citalopram.   Please advise pt.

## 2020-10-15 NOTE — PROGRESS NOTES
Called pt with recommendations from Janene Mckeon NP. Prescription escripted to [facility] as requested. Advised him to take in the AM with food. Joao message scheduling to call pt to arrange virtual visit w/ Janene Mckeon NP in one week. Elia VANG

## 2020-10-15 NOTE — TELEPHONE ENCOUNTER
Patient will be out of medication tomorrow, please address questions below and call patient back ASAP

## 2020-10-15 NOTE — PROGRESS NOTES
Patient was seen in a virtual visit yesterday. I have reviewed with Dr. Wayne patients persistent atypical chest pain, primarily triggered by anxiety. MRI is not recommended at this time. However, to rule out any inflammatory process, Dr. Wayne is requesting a 5 day trial of Prednisone 20 mg daily. Plan for a virtual follow up in 1 week to reassess symptoms. Please notify patient with the above recommendations.

## 2020-10-19 ENCOUNTER — TELEPHONE (OUTPATIENT)
Dept: CARDIOLOGY | Facility: CLINIC | Age: 60
End: 2020-10-19

## 2020-10-19 DIAGNOSIS — I25.10 CORONARY ARTERY DISEASE INVOLVING NATIVE CORONARY ARTERY OF NATIVE HEART WITHOUT ANGINA PECTORIS: ICD-10-CM

## 2020-10-19 DIAGNOSIS — E78.5 HYPERLIPIDEMIA LDL GOAL <70: ICD-10-CM

## 2020-10-19 DIAGNOSIS — R07.9 CHEST PAIN, UNSPECIFIED TYPE: Primary | ICD-10-CM

## 2020-10-19 DIAGNOSIS — I25.10 CORONARY ARTERY DISEASE INVOLVING NATIVE CORONARY ARTERY OF NATIVE HEART, ANGINA PRESENCE UNSPECIFIED: ICD-10-CM

## 2020-10-19 DIAGNOSIS — Z95.5 S/P RIGHT CORONARY ARTERY (RCA) STENT PLACEMENT: ICD-10-CM

## 2020-10-19 LAB
ALBUMIN SERPL-MCNC: 3.8 G/DL (ref 3.4–5)
ALP SERPL-CCNC: 54 U/L (ref 40–150)
ALT SERPL W P-5'-P-CCNC: 48 U/L (ref 0–70)
ANION GAP SERPL CALCULATED.3IONS-SCNC: 4 MMOL/L (ref 3–14)
AST SERPL W P-5'-P-CCNC: 25 U/L (ref 0–45)
BILIRUB SERPL-MCNC: 0.7 MG/DL (ref 0.2–1.3)
BUN SERPL-MCNC: 11 MG/DL (ref 7–30)
CALCIUM SERPL-MCNC: 9 MG/DL (ref 8.5–10.1)
CHLORIDE SERPL-SCNC: 104 MMOL/L (ref 94–109)
CHOLEST SERPL-MCNC: 134 MG/DL
CO2 SERPL-SCNC: 31 MMOL/L (ref 20–32)
CREAT SERPL-MCNC: 0.74 MG/DL (ref 0.66–1.25)
GFR SERPL CREATININE-BSD FRML MDRD: >90 ML/MIN/{1.73_M2}
GLUCOSE SERPL-MCNC: 89 MG/DL (ref 70–99)
HDLC SERPL-MCNC: 40 MG/DL
LDLC SERPL CALC-MCNC: 66 MG/DL
NONHDLC SERPL-MCNC: 94 MG/DL
POTASSIUM SERPL-SCNC: 3.7 MMOL/L (ref 3.4–5.3)
PROT SERPL-MCNC: 7.2 G/DL (ref 6.8–8.8)
SODIUM SERPL-SCNC: 139 MMOL/L (ref 133–144)
TRIGL SERPL-MCNC: 142 MG/DL

## 2020-10-19 PROCEDURE — 36415 COLL VENOUS BLD VENIPUNCTURE: CPT | Performed by: INTERNAL MEDICINE

## 2020-10-19 PROCEDURE — 80061 LIPID PANEL: CPT | Performed by: INTERNAL MEDICINE

## 2020-10-19 PROCEDURE — 80053 COMPREHEN METABOLIC PANEL: CPT | Performed by: INTERNAL MEDICINE

## 2020-10-19 NOTE — TELEPHONE ENCOUNTER
Reviewed update with Dr. Wayne. Take prednisone for 5 days total and stop. Follow up with PCP as scheduled.    Continue with all other medications. Follow up with Dr. Wayne in 6 months.

## 2020-10-19 NOTE — TELEPHONE ENCOUNTER
Called pt as requested, pt states he thinks the prednisone is helping. He states he felt great on Friday, he was out in his garage working on cars & states he did not get any chest pain that day. He states he did get some chest pain on Saturday but states it was his anxiety pain, a fullness or tightness in his chest which he states he has learned his his anxiety symptoms. He took one of his anxiety pills with relief. Sunday he felt good, denies any chest pain symptoms. Today he states he is having his anxiety pains again & has not taken one of his anxiety pills yet as he states they are habit forming so he tries to take very sparingly. He has had a lot fewer of his sharp stabbing chest pains since starting the prednisone & they few he has had have been lower in intensity. He saw Dr. Lucas on Friday who increased his dose of citalopram & states he has follow up with him again this Friday. Will let Janene Mckeon NP know. Elia VANG

## 2020-10-19 NOTE — TELEPHONE ENCOUNTER
----- Message from GLORIA Paz CNP sent at 10/19/2020 11:58 AM CDT -----  Daniel Funez,     Could we call the above patient and see if he has had any improvement in symptoms after starting the short course of Prednisone.     Thanks,   Janene

## 2020-10-21 NOTE — TELEPHONE ENCOUNTER
Called pt with recommendations from Janene Mckeon NP & Dr. Wayne. Pt prefers to keep his appt w/ Janene on Monday. Order placed to follow up with Dr. Wayne in 6 months. Elia VANG

## 2020-10-21 NOTE — TELEPHONE ENCOUNTER
I am more than happy to see him. I did review the update with Dr. Wayne who feels his response to the prednisone does not represent cardiac symptoms. We would recommend follow up with PCP for non cardiac causes of chest pain and treatment of his anxiety.

## 2020-10-22 DIAGNOSIS — R07.89 ATYPICAL CHEST PAIN: Primary | ICD-10-CM

## 2020-10-22 NOTE — PROGRESS NOTES
Received a mychart message from patient with concerns regarding chest pain.   He has recently undergone an extensive negative cardiac workup in follow up to a stent placement on 4/23/20.   He has had multiple medication changes with no significant improvement with the exception of Prednisone.   Patient has multiple concerns and recommend follow up visit with Dr. Wayne.   Orders have been placed.

## 2020-10-23 ENCOUNTER — OFFICE VISIT (OUTPATIENT)
Dept: INTERNAL MEDICINE | Facility: CLINIC | Age: 60
End: 2020-10-23
Payer: COMMERCIAL

## 2020-10-23 VITALS
HEIGHT: 74 IN | DIASTOLIC BLOOD PRESSURE: 72 MMHG | WEIGHT: 220.9 LBS | SYSTOLIC BLOOD PRESSURE: 116 MMHG | BODY MASS INDEX: 28.35 KG/M2 | HEART RATE: 66 BPM | TEMPERATURE: 97.5 F | RESPIRATION RATE: 18 BRPM | OXYGEN SATURATION: 100 %

## 2020-10-23 DIAGNOSIS — F41.9 ANXIETY: Primary | ICD-10-CM

## 2020-10-23 DIAGNOSIS — R79.89 ABNORMAL LFTS: ICD-10-CM

## 2020-10-23 DIAGNOSIS — E78.5 HYPERLIPIDEMIA LDL GOAL <70: ICD-10-CM

## 2020-10-23 DIAGNOSIS — G47.00 INSOMNIA, UNSPECIFIED TYPE: ICD-10-CM

## 2020-10-23 DIAGNOSIS — I25.10 CORONARY ARTERY DISEASE INVOLVING NATIVE CORONARY ARTERY OF NATIVE HEART, ANGINA PRESENCE UNSPECIFIED: ICD-10-CM

## 2020-10-23 DIAGNOSIS — R07.89 ATYPICAL CHEST PAIN: ICD-10-CM

## 2020-10-23 PROCEDURE — 99214 OFFICE O/P EST MOD 30 MIN: CPT | Performed by: INTERNAL MEDICINE

## 2020-10-23 RX ORDER — TRAZODONE HYDROCHLORIDE 50 MG/1
25-100 TABLET, FILM COATED ORAL
Qty: 60 TABLET | Refills: 3 | Status: SHIPPED | OUTPATIENT
Start: 2020-10-23 | End: 2021-01-28 | Stop reason: SINTOL

## 2020-10-23 ASSESSMENT — ANXIETY QUESTIONNAIRES
IF YOU CHECKED OFF ANY PROBLEMS ON THIS QUESTIONNAIRE, HOW DIFFICULT HAVE THESE PROBLEMS MADE IT FOR YOU TO DO YOUR WORK, TAKE CARE OF THINGS AT HOME, OR GET ALONG WITH OTHER PEOPLE: EXTREMELY DIFFICULT
1. FEELING NERVOUS, ANXIOUS, OR ON EDGE: NEARLY EVERY DAY
6. BECOMING EASILY ANNOYED OR IRRITABLE: NOT AT ALL
3. WORRYING TOO MUCH ABOUT DIFFERENT THINGS: NEARLY EVERY DAY
5. BEING SO RESTLESS THAT IT IS HARD TO SIT STILL: NOT AT ALL
7. FEELING AFRAID AS IF SOMETHING AWFUL MIGHT HAPPEN: NOT AT ALL
GAD7 TOTAL SCORE: 10
2. NOT BEING ABLE TO STOP OR CONTROL WORRYING: MORE THAN HALF THE DAYS

## 2020-10-23 ASSESSMENT — MIFFLIN-ST. JEOR: SCORE: 1882.07

## 2020-10-23 ASSESSMENT — PATIENT HEALTH QUESTIONNAIRE - PHQ9: 5. POOR APPETITE OR OVEREATING: MORE THAN HALF THE DAYS

## 2020-10-23 NOTE — PROGRESS NOTES
"Subjective   1130---1205    Amado Veliz is a 60 year old male who presents to clinic today for health counseling regarding several health issues. 25 minutes were spent with the patient face to face today, over 50% of which was devoted to health counseling and education regarding these issues.       Patient is being seen for an follow up and to discuss chest pain, anxiety and insomnia.  HPI         Hyperlipidemia Follow-Up      Are you regularly taking any medication or supplement to lower your cholesterol?   Yes- Atorvastatin    Are you having muscle aches or other side effects that you think could be caused by your cholesterol lowering medication?  No    Hypertension Follow-up      Do you check your blood pressure regularly outside of the clinic? Yes     Are you following a low salt diet? Yes    Are your blood pressures ever more than 140 on the top number (systolic) OR more   than 90 on the bottom number (diastolic), for example 140/90? No      How many servings of fruits and vegetables do you eat daily?  4 or more    On average, how many sweetened beverages do you drink each day (Examples: soda, juice, sweet tea, etc.  Do NOT count diet or artificially sweetened beverages)?   0    How many days per week do you exercise enough to make your heart beat faster? 5    How many minutes a day do you exercise enough to make your heart beat faster? 30 - 60    How many days per week do you miss taking your medication? 0    The patient continues to be bothered by episodic chest discomfort.  He describes having a \"heavy or achy feeling\".  He has noted the symptoms to resolve rather promptly after taking clonazepam.    The patient does endorse having some symptoms of anxiety.  He is not yet noted benefit from citalopram, however, we discussed that extending the trial further with citalopram may well provide some additional benefit.  We also discussed that he may continue to use clonazepam or very occasionally for anxiety " "or for more bother some chest discomfort.    We note that his LDL cholesterol looks favorable even since reducing atorvastatin from 40 to 20 mg daily.  He has had some difficulty with sleep, and wonders whether this could be due to metoprolol.  Although this is possible, I advised him that it would seem rather unlikely.  We discussed trying trazodone at bedtime to see if this helps for sleep.    We also discussed that he might benefit from meeting with a therapist to discuss anxiety symptoms.  He does now acknowledge feeling more anxious.    Past medical, family and social histories as well as medications reviewed and updated as needed.    Review of Systems   REVIEW OF SYSTEMS: The following systems have been completely reviewed and are negative except as noted above:   Constitutional, respiratory, cardiovascular, gastrointestinal, musculoskeletal, psychiatric, and neurologic systems.        Vitals: /72   Pulse 66   Temp 97.5  F (36.4  C) (Oral)   Resp 18   Ht 1.88 m (6' 2.02\")   Wt 100.2 kg (220 lb 14.4 oz)   SpO2 100%   BMI 28.35 kg/m    BMI= Body mass index is 28.35 kg/m .     Physical Exam   GENERAL: healthy, alert and no distress  RESP: lungs clear to auscultation - no rales, rhonchi or wheezes  CV: regular rate and rhythm, normal S1 S2, no S3 or S4, no murmur, click or rub, no peripheral edema and peripheral pulses strong  MS: no gross musculoskeletal defects noted, no edema    No results found for any visits on 10/23/20.        Assessment & Plan   Health counseling regarding several health issues. 25 minutes were spent with the patient face to face today, over 50% of which was devoted to health counseling and education regarding these issues.     (F41.9) Anxiety  (primary encounter diagnosis)  Comment: Continue citalopram. Continue episodic use of clonazepam.   Advised meeting with a therapist.   Plan: MENTAL HEALTH REFERRAL  - Adult; Psychiatry;         Psychiatry; [facility]: Collaborative Care " "Psychiatry         Service/Bridge to Long-Term Psychiatry as         indicated ([phone]); No - Patient must         be evaluated prior to placing referral OR         document reason for refer...          (G47.00) Insomnia, unspecified type  Comment: Offered Rx for trazodone. Suggested meeting with a therapist.   Plan: MENTAL HEALTH REFERRAL  - Adult; Psychiatry;         Psychiatry; G: [facility]         Service/Bridge to Long-Term Psychiatry as         indicated ([phone]); No - Patient must         be evaluated prior to placing referral OR         document reason for refer..., traZODone         (DESYREL) 50 MG tablet          (R07.89) Atypical chest pain  Comment: Suspect most recent episodes of chest discomfort related to combination of anxiety and some chest wall component.     (I25.10) Coronary artery disease involving native coronary artery of native heart, angina presence unspecified  Comment: Cardiology is confident that no further workup is needed.     (E78.5) Hyperlipidemia LDL goal <70  Comment: LDL at target. Continue current meds.     (R94.5) Abnormal LFTs  Comment: Resolved with reduced dose of atorvastatin.      BMI:   Estimated body mass index is 28.35 kg/m  as calculated from the following:    Height as of this encounter: 1.88 m (6' 2.02\").    Weight as of this encounter: 100.2 kg (220 lb 14.4 oz).            Patient Instructions   Since the cardiologists feel confident about your heart AND since the chest pains seem to resolve with clonazepam, I also feel confident that the chest pains are not cardiac.     There might be a chest wall component along with the anxiety component, however, knowing that doesn't usually influence our treatment plan (except for simple things like ice/heat, Tylenol, etc).     So I think that our focus should otherwise be on the anxiety (and insomnia).  A) Keep up with the citalopram at 20 mg daily.   B) Use the clonazepam two to three times " a week if needed.   C) I do have one idea of a medication that could be used here and there at bedtime for sleep, called Trazodone--I'll send that to your pharmacy.     Also, recommend getting some input from our mental health prescriber (someone should be calling you to help schedule).     Cholesterol AND liver tests look great.     See me for a video visit in about 6-8 weeks.       Return in about 6 months (around 4/23/2021).    Ildefonso Lucas MD,   [facility]

## 2020-10-23 NOTE — PATIENT INSTRUCTIONS
Since the cardiologists feel confident about your heart AND since the chest pains seem to resolve with clonazepam, I also feel confident that the chest pains are not cardiac.     There might be a chest wall component along with the anxiety component, however, knowing that doesn't usually influence our treatment plan (except for simple things like ice/heat, Tylenol, etc).     So I think that our focus should otherwise be on the anxiety (and insomnia).  A) Keep up with the citalopram at 20 mg daily.   B) Use the clonazepam two to three times a week if needed.   C) I do have one idea of a medication that could be used here and there at bedtime for sleep, called Trazodone--I'll send that to your pharmacy.     Also, recommend getting some input from our mental health prescriber (someone should be calling you to help schedule).     Cholesterol AND liver tests look great.     See me for a video visit in about 6-8 weeks.

## 2020-10-24 ASSESSMENT — ANXIETY QUESTIONNAIRES: GAD7 TOTAL SCORE: 10

## 2020-10-25 ENCOUNTER — MYC MEDICAL ADVICE (OUTPATIENT)
Dept: INTERNAL MEDICINE | Facility: CLINIC | Age: 60
End: 2020-10-25

## 2020-10-25 DIAGNOSIS — I20.0 UNSTABLE ANGINA (H): ICD-10-CM

## 2020-10-26 RX ORDER — ATORVASTATIN CALCIUM 20 MG/1
20 TABLET, FILM COATED ORAL EVERY EVENING
Qty: 90 TABLET | Refills: 3 | Status: SHIPPED | OUTPATIENT
Start: 2020-10-26 | End: 2021-10-27

## 2020-10-26 NOTE — TELEPHONE ENCOUNTER
Patient requesting to change Atorvastatin prescription from 40 mg to 20 mg tabs, currently breaking 40 mg tab in half.    Prescription approved per [facility] Refill Protocol.

## 2020-10-27 ENCOUNTER — VIRTUAL VISIT (OUTPATIENT)
Dept: CARDIOLOGY | Facility: CLINIC | Age: 60
End: 2020-10-27
Attending: NURSE PRACTITIONER
Payer: COMMERCIAL

## 2020-10-27 DIAGNOSIS — R07.89 ATYPICAL CHEST PAIN: ICD-10-CM

## 2020-10-27 PROCEDURE — 99214 OFFICE O/P EST MOD 30 MIN: CPT | Mod: 95 | Performed by: INTERNAL MEDICINE

## 2020-10-27 NOTE — PROGRESS NOTES
"Amado Veliz is a 60 year old male who is being evaluated via a billable video visit.      The patient has been notified of following:     \"This video visit will be conducted via a call between you and your physician/provider. We have found that certain health care needs can be provided without the need for an in-person physical exam.  This service lets us provide the care you need with a video conversation.  If a prescription is necessary we can send it directly to your pharmacy.  If lab work is needed we can place an order for that and you can then stop by our lab to have the test done at a later time.    Video visits are billed at different rates depending on your insurance coverage.  Please reach out to your insurance provider with any questions.    If during the course of the call the physician/provider feels a video visit is not appropriate, you will not be charged for this service.\"    Patient has given verbal consent for Video visit? Yes  How would you like to obtain your AVS? Mail a copy  If you are dropped from the video visit, the video invite should be resent to: Send to e-mail at: [email or web address]  Will anyone else be joining your video visit? No      Vitals - Patient Reported  Systolic (Patient Reported): 117  Diastolic (Patient Reported): 68  Weight (Patient Reported): 97.5 kg (215 lb)  Height (Patient Reported): 188 cm (6' 2\")  BMI (Based on Pt Reported Ht/Wt): 27.6  Pulse (Patient Reported): 60    Review Of Systems  Skin: NEGATIVE  Eyes:Ears/Nose/Throat: NEGATIVE  Respiratory: NEGATIVE  Cardiovascular: NEGATIVE  Gastrointestinal: NEGATIVE  Genitourinary:NEGATIVE   Musculoskeletal: NEGATIVE  Neurologic: NEGATIVE  Psychiatric: NEGATIVE  Hematologic/Lymphatic/Immunologic: NEGATIVE  Endocrine:  NEGATIVE    Reviewed by MODE Guthrie, 10/27/20    Video-Visit Details    Type of service:  Video Visit    Video Start Time: 8:15 AM  Video End Time: 8:45 AM    Originating Location (pt. Location): " Home    Distant Location (provider location):  [facility] HE[facility]     Platform used for Video Visit: Martina     This clinic visit was performed via telephone/video due to COVID-19 restrictions.    Today, I had the pleasure of connecting with Amado FORBES Denaekrishna for a follow-up visit.  He is a pleasant 60 year old patient with a past medical history of CAD status post PCI of proximal RCA with a 4.0 x 16 mm Synergy drug-eluting stent and hyperlipidemia who presents for follow-up visit.     Since the the angiogram which led to intervention on the RCA the patient has presented multiple times to the emergency department for complaints of sharp, substernal, nonexertional chest pain.  He has also been seen in our clinic several times by Ms. Janene Ruggiero for similar complaints.  Since undergoing the coronary angiogram he has had another stress test which was negative.  He has been tried on Advil, colchicine and most recently a short course of steroid all of which the patient believes helped him briefly, while he was on these medications.  His blood work has shown normal ESR and CRP, there is no evidence of pericarditis on the EKG.    Today, patient tells me that a week ago he was shoveling snow when he noticed another episode of substernal chest pain which was more severe in intensity than the usual episodes and lasted for around 30 minutes before resolving spontaneously.  He did not try sublingual nitroglycerin for it although in retrospect he thinks he should have done so.  He also has been on antianxiety pills but tries not to take them as they are habit-forming.  Today is the first day in a long while that he has not felt chest pain in the morning.     Assessment:  1.  CAD status post PCI of RCA with a LUZ  2.  Hyperlipidemia  3.  Atypical chest pain- pleuritis/pericarditis?  4.  Severe anxiety disorder    I discussed with the patient that the pain does not sound to be of coronary origin.  There are  multiple reasons for this.  First, coronary angiogram did not have any residual disease which would explain his symptoms.  Second, he had a stress test which was negative for ischemia.  Thirdly, the nature and description of pain is very atypical of angina, and it is significantly different than the pain he experienced in the past.    Because the pain has improved when he was on anti-inflammatory medication such as Advil, colchicine and prednisone I believe it is reasonable to try another course of these medications.  I will start with colchicine and reevaluate the patient in 3 months.  If he still has pain after 2-month course of colchicine I will try him 1 month course of prednisone taper.  I have told him to take Advil only on as-needed basis to minimize the bleeding risk as he is on both aspirin and Plavix.  Because of the report of one episode of substernal exertional chest pain that he experienced while shoveling snow I will obtain a cardiac MRI to rule out ischemia.  Hopefully it will also be enough to evaluate pericardium.    Plan:   1.  Cardiac stress MRI to rule out ischemia  2.  Start colchicine 0.6 g p.o. twice daily for 4 weeks followed by 0.6 mg daily for another 4 weeks.  If colchicine fails to help her symptoms we will do a prednisone taper starting with 40 mg daily x7 days, followed by 20 mg daily x7 days, 10 mg daily x7 days, 5 mg daily x7 days.  3.  Follow-up with KEE in 12 weeks.     Thank you for allowing me to participate in the care of Amado Veliz     This note was completed in part using Dragon voice recognition software. Although reviewed after completion, some word and grammatical errors may occur.     Abelino Wayne MD  Cardiology      Orders Placed This Encounter   Procedures     MRI Cardiac w/contrast and stress     Follow-Up with Cardiac Advanced Practice Provider       Medications Discontinued During This Encounter   Medication Reason     diazepam (VALIUM) 5 MG tablet Therapy  completed     predniSONE (DELTASONE) 20 MG tablet Therapy completed       Encounter Diagnosis   Name Primary?     Atypical chest pain        CURRENT MEDICATIONS:  Current Outpatient Medications   Medication Sig Dispense Refill     aspirin (ASA) 81 MG EC tablet Take 1 tablet (81 mg) by mouth daily 90 tablet 3     atorvastatin (LIPITOR) 20 MG tablet Take 1 tablet (20 mg) by mouth every evening 90 tablet 3     citalopram (CELEXA) 20 MG tablet Take 1 tablet (20 mg) by mouth daily 30 tablet 2     clonazePAM (KLONOPIN) 0.5 MG tablet Take 1 tablet (0.5 mg) by mouth 2 times daily as needed for anxiety (Use infrequently for more acute anxiety) 20 tablet 0     clopidogrel (PLAVIX) 75 MG tablet Take 1 tablet (75 mg) by mouth daily 90 tablet 3     metoprolol succinate ER (TOPROL-XL) 25 MG 24 hr tablet Take 0.5 tablets (12.5 mg) by mouth daily 90 tablet 1     nitroGLYcerin (NITROSTAT) 0.4 MG sublingual tablet For chest pain place 1 tablet under the tongue every 5 minutes for 3 doses. If symptoms persist 5 minutes after 1st dose call 911. 30 tablet 0     traZODone (DESYREL) 50 MG tablet Take 0.5-2 tablets ( mg) by mouth nightly as needed for sleep 60 tablet 3       ALLERGIES     Allergies   Allergen Reactions     Niacin Unknown     Patient gets flushed and red in the face.       PAST MEDICAL HISTORY:  Past Medical History:   Diagnosis Date     CAD (coronary artery disease)     Status post stenting of proximal right coronary artery complex ruptured plaque on 23 April 2020.     Hyperlipidemia LDL goal <70      Intestinal infection due to Clostridium difficile      Pharyngoesophageal dysphagia        PAST SURGICAL HISTORY:  Past Surgical History:   Procedure Laterality Date     BACK SURGERY      L5-S1 disk herniation      CV CORONARY ANGIOGRAM N/A 4/23/2020    Procedure: Coronary Angiogram;  Surgeon: Dreek Walker MD;  Location: [facility] CARDIAC CATH LAB     CV INTRAVASULAR ULTRASOUND N/A 4/23/2020    Procedure:  Intravascular Ultrasound;  Surgeon: Derek Walker MD;  Location:  HEART CARDIAC CATH LAB     CV PCI STENT DRUG ELUTING       CV PCI STENT DRUG ELUTING N/A 2020    Procedure: Percutaneous Coronary Intervention Stent Drug Eluting;  Surgeon: Derek Walker MD;  Location:  HEART CARDIAC CATH LAB       FAMILY HISTORY:  Family History   Problem Relation Age of Onset     Family History Negative Father      Deep Vein Thrombosis Father      Atrial fibrillation Father      Family History Negative Mother      Family History Negative Brother      Deep Vein Thrombosis Sister      Ovarian Cancer Maternal Grandmother      Coronary Artery Disease Maternal Grandfather        SOCIAL HISTORY:  Social History     Socioeconomic History     Marital status:      Spouse name: None     Number of children: 0     Years of education: None     Highest education level: None   Occupational History     Occupation:    Social Needs     Financial resource strain: None     Food insecurity     Worry: None     Inability: None     Transportation needs     Medical: None     Non-medical: None   Tobacco Use     Smoking status: Former Smoker     Packs/day: 1.00     Years: 15.00     Pack years: 15.00     Types: Cigarettes     Start date:      Quit date: 3/17/1993     Years since quittin.6     Smokeless tobacco: Never Used   Substance and Sexual Activity     Alcohol use: No     Drug use: No     Sexual activity: Yes   Lifestyle     Physical activity     Days per week: None     Minutes per session: None     Stress: None   Relationships     Social connections     Talks on phone: None     Gets together: None     Attends [employer] service: None     Active member of club or organization: None     Attends meetings of clubs or organizations: None     Relationship status: None     Intimate partner violence     Fear of current or ex partner: None     Emotionally abused: None     Physically abused: None      Forced sexual activity: None   Other Topics Concern      Service Not Asked     Blood Transfusions Not Asked     Caffeine Concern Not Asked     Occupational Exposure Not Asked     Hobby Hazards Not Asked     Sleep Concern Not Asked     Stress Concern Not Asked     Weight Concern Not Asked     Special Diet Not Asked     Back Care Not Asked     Exercise Not Asked     Bike Helmet Not Asked     Seat Belt Yes     Self-Exams Not Asked     Parent/sibling w/ CABG, MI or angioplasty before 65F 55M? Not Asked   Social History Narrative    SD CHECKED REGULARLY.    GUNS UNLOADED IN CLOSET AT HOME.       General Appearance: No distress, normal body habitus, upright.  ENT/Mouth:  Normal head shape, no evidence of injury or laceration.  EYES: No scleral icterus, normal conjunctivae  Neck:  No evidence of thyromegaly  Chest/Lungs: No audible wheezing. Non labored breathing. No cough.  Cardiovascular: No evidence of elevated jugular venous pressure.   Abdomen:  No observed jaundice.  Extremities: No cyanosis.  Skin: No xanthelasma, normal skin collar. No evidence of facial lacerations.  Neurologic: No focal neurological defect. Normal speech.  Psychiatric: Alert and oriented x3    Abelino Wayne MD

## 2020-10-27 NOTE — LETTER
"10/27/2020    Ildefonso Lucas MD, MD  [address] [facility] [address]  [facility] [address]    RE: Amado Veliz       Dear Colleague,    I had the pleasure of seeing Amado Veliz in the [facility].    Amado Veliz is a 60 year old male who is being evaluated via a billable video visit.      The patient has been notified of following:     \"This video visit will be conducted via a call between you and your physician/provider. We have found that certain health care needs can be provided without the need for an in-person physical exam.  This service lets us provide the care you need with a video conversation.  If a prescription is necessary we can send it directly to your pharmacy.  If lab work is needed we can place an order for that and you can then stop by our lab to have the test done at a later time.    Video visits are billed at different rates depending on your insurance coverage.  Please reach out to your insurance provider with any questions.    If during the course of the call the physician/provider feels a video visit is not appropriate, you will not be charged for this service.\"    Patient has given verbal consent for Video visit? Yes  How would you like to obtain your AVS? Mail a copy  If you are dropped from the video visit, the video invite should be resent to: Send to e-mail at: [email or web address]  Will anyone else be joining your video visit? No      Vitals - Patient Reported  Systolic (Patient Reported): 117  Diastolic (Patient Reported): 68  Weight (Patient Reported): 97.5 kg (215 lb)  Height (Patient Reported): 188 cm (6' 2\")  BMI (Based on Pt Reported Ht/Wt): 27.6  Pulse (Patient Reported): 60    Review Of Systems  Skin: NEGATIVE  Eyes:Ears/Nose/Throat: NEGATIVE  Respiratory: NEGATIVE  Cardiovascular: NEGATIVE  Gastrointestinal: NEGATIVE  Genitourinary:NEGATIVE   Musculoskeletal: NEGATIVE  Neurologic: NEGATIVE  Psychiatric: " NEGATIVE  Hematologic/Lymphatic/Immunologic: NEGATIVE  Endocrine:  NEGATIVE    Reviewed by MODE Guthrie, 10/27/20    Video-Visit Details    Type of service:  Video Visit    Video Start Time: 8:15 AM  Video End Time: 8:45 AM    Originating Location (pt. Location): Home    Distant Location (provider location):  [facility]     Platform used for Video Visit: AddisWell     This clinic visit was performed via telephone/video due to COVID-19 restrictions.    Today, I had the pleasure of connecting with Amado Veliz for a follow-up visit.  He is a pleasant 60 year old patient with a past medical history of CAD status post PCI of proximal RCA with a 4.0 x 16 mm Synergy drug-eluting stent and hyperlipidemia who presents for follow-up visit.     Since the the angiogram which led to intervention on the RCA the patient has presented multiple times to the emergency department for complaints of sharp, substernal, nonexertional chest pain.  He has also been seen in our clinic several times by Ms. Janene Ruggiero for similar complaints.  Since undergoing the coronary angiogram he has had another stress test which was negative.  He has been tried on Advil, colchicine and most recently a short course of steroid all of which the patient believes helped him briefly, while he was on these medications.  His blood work has shown normal ESR and CRP, there is no evidence of pericarditis on the EKG.    Today, patient tells me that a week ago he was shoveling snow when he noticed another episode of substernal chest pain which was more severe in intensity than the usual episodes and lasted for around 30 minutes before resolving spontaneously.  He did not try sublingual nitroglycerin for it although in retrospect he thinks he should have done so.  He also has been on antianxiety pills but tries not to take them as they are habit-forming.  Today is the first day in a long while that he has not felt chest pain in  the morning.     Assessment:  1.  CAD status post PCI of RCA with a LUZ  2.  Hyperlipidemia  3.  Atypical chest pain- pleuritis/pericarditis?  4.  Severe anxiety disorder    I discussed with the patient that the pain does not sound to be of coronary origin.  There are multiple reasons for this.  First, coronary angiogram did not have any residual disease which would explain his symptoms.  Second, he had a stress test which was negative for ischemia.  Thirdly, the nature and description of pain is very atypical of angina, and it is significantly different than the pain he experienced in the past.    Because the pain has improved when he was on anti-inflammatory medication such as Advil, colchicine and prednisone I believe it is reasonable to try another course of these medications.  I will start with colchicine and reevaluate the patient in 3 months.  If he still has pain after 2-month course of colchicine I will try him 1 month course of prednisone taper.  I have told him to take Advil only on as-needed basis to minimize the bleeding risk as he is on both aspirin and Plavix.  Because of the report of one episode of substernal exertional chest pain that he experienced while shoveling snow I will obtain a cardiac MRI to rule out ischemia.  Hopefully it will also be enough to evaluate pericardium.    Plan:   1.  Cardiac stress MRI to rule out ischemia  2.  Start colchicine 0.6 g p.o. twice daily for 4 weeks followed by 0.6 mg daily for another 4 weeks.  If colchicine fails to help her symptoms we will do a prednisone taper starting with 40 mg daily x7 days, followed by 20 mg daily x7 days, 10 mg daily x7 days, 5 mg daily x7 days.  3.  Follow-up with KEE in 12 weeks.     Thank you for allowing me to participate in the care of Amado FORBES Lázar[place]inton     This note was completed in part using Dragon voice recognition software. Although reviewed after completion, some word and grammatical errors may occur.     Abelino Wayne,  MD  Cardiology      Orders Placed This Encounter   Procedures     MRI Cardiac w/contrast and stress     Follow-Up with Cardiac Advanced Practice Provider       Medications Discontinued During This Encounter   Medication Reason     diazepam (VALIUM) 5 MG tablet Therapy completed     predniSONE (DELTASONE) 20 MG tablet Therapy completed       Encounter Diagnosis   Name Primary?     Atypical chest pain        CURRENT MEDICATIONS:  Current Outpatient Medications   Medication Sig Dispense Refill     aspirin (ASA) 81 MG EC tablet Take 1 tablet (81 mg) by mouth daily 90 tablet 3     atorvastatin (LIPITOR) 20 MG tablet Take 1 tablet (20 mg) by mouth every evening 90 tablet 3     citalopram (CELEXA) 20 MG tablet Take 1 tablet (20 mg) by mouth daily 30 tablet 2     clonazePAM (KLONOPIN) 0.5 MG tablet Take 1 tablet (0.5 mg) by mouth 2 times daily as needed for anxiety (Use infrequently for more acute anxiety) 20 tablet 0     clopidogrel (PLAVIX) 75 MG tablet Take 1 tablet (75 mg) by mouth daily 90 tablet 3     metoprolol succinate ER (TOPROL-XL) 25 MG 24 hr tablet Take 0.5 tablets (12.5 mg) by mouth daily 90 tablet 1     nitroGLYcerin (NITROSTAT) 0.4 MG sublingual tablet For chest pain place 1 tablet under the tongue every 5 minutes for 3 doses. If symptoms persist 5 minutes after 1st dose call 911. 30 tablet 0     traZODone (DESYREL) 50 MG tablet Take 0.5-2 tablets ( mg) by mouth nightly as needed for sleep 60 tablet 3       ALLERGIES     Allergies   Allergen Reactions     Niacin Unknown     Patient gets flushed and red in the face.       PAST MEDICAL HISTORY:  Past Medical History:   Diagnosis Date     CAD (coronary artery disease)     Status post stenting of proximal right coronary artery complex ruptured plaque on 23 April 2020.     Hyperlipidemia LDL goal <70      Intestinal infection due to Clostridium difficile      Pharyngoesophageal dysphagia        PAST SURGICAL HISTORY:  Past Surgical History:   Procedure  Laterality Date     BACK SURGERY      L5-S1 disk herniation      CV CORONARY ANGIOGRAM N/A 2020    Procedure: Coronary Angiogram;  Surgeon: Derek Walker MD;  Location:  HEART CARDI[facility] CATH LAB     CV INTRAVASULAR ULTRASOUND N/A 2020    Procedure: Intravascular Ultrasound;  Surgeon: Derek Walker MD;  Location:  [facility]     CV PCI STENT DRUG ELUTING       CV PCI STENT DRUG ELUTING N/A 2020    Procedure: Percutaneous Coronary Intervention Stent Drug Eluting;  Surgeon: Derek Walker MD;  Location:  HEART CARDIAC CATH LAB       FAMILY HISTORY:  Family History   Problem Relation Age of Onset     Family History Negative Father      Deep Vein Thrombosis Father      Atrial fibrillation Father      Family History Negative Mother      Family History Negative Brother      Deep Vein Thrombosis Sister      Ovarian Cancer Maternal Grandmother      Coronary Artery Disease Maternal Grandfather        SOCIAL HISTORY:  Social History     Socioeconomic History     Marital status:      Spouse name: None     Number of children: 0     Years of education: None     Highest education level: None   Occupational History     Occupation:    Social Needs     Financial resource strain: None     Food insecurity     Worry: None     Inability: None     Transportation needs     Medical: None     Non-medical: None   Tobacco Use     Smoking status: Former Smoker     Packs/day: 1.00     Years: 15.00     Pack years: 15.00     Types: Cigarettes     Start date:      Quit date: 3/17/1993     Years since quittin.6     Smokeless tobacco: Never Used   Substance and Sexual Activity     Alcohol use: No     Drug use: No     Sexual activity: Yes   Lifestyle     Physical activity     Days per week: None     Minutes per session: None     Stress: None   Relationships     Social connections     Talks on phone: None     Gets together: None     Attends [employer]  service: None     Active member of club or organization: None     Attends meetings of clubs or organizations: None     Relationship status: None     Intimate partner violence     Fear of current or ex partner: None     Emotionally abused: None     Physically abused: None     Forced sexual activity: None   Other Topics Concern      Service Not Asked     Blood Transfusions Not Asked     Caffeine Concern Not Asked     Occupational Exposure Not Asked     Hobby Hazards Not Asked     Sleep Concern Not Asked     Stress Concern Not Asked     Weight Concern Not Asked     Special Diet Not Asked     Back Care Not Asked     Exercise Not Asked     Bike Helmet Not Asked     Seat Belt Yes     Self-Exams Not Asked     Parent/sibling w/ CABG, MI or angioplasty before 65F 55M? Not Asked   Social History Narrative    SD CHECKED REGULARLY.    GUNS UNLOADED IN CLOSET AT HOME.       General Appearance: No distress, normal body habitus, upright.  ENT/Mouth:  Normal head shape, no evidence of injury or laceration.  EYES: No scleral icterus, normal conjunctivae  Neck:  No evidence of thyromegaly  Chest/Lungs: No audible wheezing. Non labored breathing. No cough.  Cardiovascular: No evidence of elevated jugular venous pressure.   Abdomen:  No observed jaundice.  Extremities: No cyanosis.  Skin: No xanthelasma, normal skin collar. No evidence of facial lacerations.  Neurologic: No focal neurological defect. Normal speech.  Psychiatric: Alert and oriented x3        Thank you for allowing me to participate in the care of your patient.    Sincerely,     Abelino Wayne MD     [facility]

## 2020-10-28 ENCOUNTER — ALLIED HEALTH/NURSE VISIT (OUTPATIENT)
Dept: NURSING | Facility: CLINIC | Age: 60
End: 2020-10-28
Payer: COMMERCIAL

## 2020-10-28 DIAGNOSIS — Z23 NEED FOR PROPHYLACTIC VACCINATION AND INOCULATION AGAINST INFLUENZA: Primary | ICD-10-CM

## 2020-10-28 PROCEDURE — 90471 IMMUNIZATION ADMIN: CPT

## 2020-10-28 PROCEDURE — 90682 RIV4 VACC RECOMBINANT DNA IM: CPT

## 2020-10-29 ENCOUNTER — TELEPHONE (OUTPATIENT)
Dept: CARDIOLOGY | Facility: CLINIC | Age: 60
End: 2020-10-29

## 2020-10-29 DIAGNOSIS — R07.89 ATYPICAL CHEST PAIN: Primary | ICD-10-CM

## 2020-10-29 RX ORDER — COLCHICINE 0.6 MG/1
0.6 CAPSULE ORAL DAILY
Qty: 90 CAPSULE | Refills: 1 | Status: SHIPPED | OUTPATIENT
Start: 2020-10-29 | End: 2020-12-22

## 2020-10-29 NOTE — TELEPHONE ENCOUNTER
Patient called in with questions about yesterday's visit with Dr. Wayne.  He asked about the MRI stress and I reviewed its rationale.  He asked for the instructions for taking colchicine and I reviewed this with him and told him that it will be 0.6 MG BID for 4 weeks and then 0.6 MG daily for 4 weeks.  I will send the prescription in for Mitigare since this is what was less costly than colchicine. He will take his Lipitor at HS and the second dose of colchicine near dinner time. I will send this to [facility].  All questions and concerns addressed to his satisfaction.

## 2020-11-02 ENCOUNTER — TELEPHONE (OUTPATIENT)
Dept: CARDIOLOGY | Facility: CLINIC | Age: 60
End: 2020-11-02

## 2020-11-02 NOTE — TELEPHONE ENCOUNTER
PA Initiation    Medication: colchicine (MITIGARE) 0.6 MG capsule   Insurance Company: [employer] [place] - Phone [phone] Fax [phone]  Pharmacy Filling the Rx: [id number] - [place], [place] - [address] AT [place] [address] & [facility]  Filling Pharmacy Phone: [phone]  Filling Pharmacy Fax: [phone]  Start Date: 11/2/2020

## 2020-11-02 NOTE — TELEPHONE ENCOUNTER
Prior Authorization Retail Medication Request    Medication/Dose: colchicine 0.6mg cap  ICD code (if different than what is on RX):  Chest pain  Previously Tried and Failed:  advil ,prednisone  Rationale:CHEST PAIN  I discussed with the patient that the pain does not sound to be of coronary origin.  There are multiple reasons for this.  First, coronary angiogram did not have any residual disease which would explain his symptoms.  Second, he had a stress test which was negative for ischemia.  Thirdly, the nature and description of pain is very atypical of angina, and it is significantly different than the pain he experienced in the past.    Because the pain has improved when he was on anti-inflammatory medication such as Advil, colchicine and prednisone I believe it is reasonable to try another course of these medications.  I will start with colchicine and reevaluate the patient in 3 months.  If he still has pain after 2-month course of colchicine I will try him 1 month course of prednisone taper.    Insurance Name:  [facility]  Insurance ID:  [id number]      Pharmacy Information (if different than what is on RX)  [facility]  [facility]  Phone:  [phone]      [facility]

## 2020-11-03 NOTE — TELEPHONE ENCOUNTER
PRIOR AUTHORIZATION DENIED    Medication: colchicine (MITIGARE) 0.6 MG capsule--DENIED    Denial Date: 11/3/2020    Denial Rational: Diagnosis is not an approved condition for the medication.     Appeal Information:

## 2020-11-18 ENCOUNTER — HOSPITAL ENCOUNTER (OUTPATIENT)
Dept: CARDIOLOGY | Facility: CLINIC | Age: 60
Discharge: HOME | End: 2020-11-18
Attending: INTERNAL MEDICINE | Admitting: INTERNAL MEDICINE
Payer: COMMERCIAL

## 2020-11-18 VITALS — OXYGEN SATURATION: 97 % | HEART RATE: 66 BPM | DIASTOLIC BLOOD PRESSURE: 71 MMHG | SYSTOLIC BLOOD PRESSURE: 127 MMHG

## 2020-11-18 DIAGNOSIS — R07.89 ATYPICAL CHEST PAIN: ICD-10-CM

## 2020-11-18 PROCEDURE — 93016 CV STRESS TEST SUPVJ ONLY: CPT | Performed by: INTERNAL MEDICINE

## 2020-11-18 PROCEDURE — 93017 CV STRESS TEST TRACING ONLY: CPT

## 2020-11-18 PROCEDURE — 250N000011 HC RX IP 250 OP 636: Performed by: INTERNAL MEDICINE

## 2020-11-18 PROCEDURE — A9585 GADOBUTROL INJECTION: HCPCS | Performed by: INTERNAL MEDICINE

## 2020-11-18 PROCEDURE — 93018 CV STRESS TEST I&R ONLY: CPT | Performed by: INTERNAL MEDICINE

## 2020-11-18 PROCEDURE — 255N000002 HC RX 255 OP 636: Performed by: INTERNAL MEDICINE

## 2020-11-18 PROCEDURE — 75563 CARD MRI W/STRESS IMG & DYE: CPT | Mod: 26 | Performed by: INTERNAL MEDICINE

## 2020-11-18 RX ORDER — ALBUTEROL SULFATE 90 UG/1
2 AEROSOL, METERED RESPIRATORY (INHALATION) EVERY 5 MIN PRN
Status: DISCONTINUED | OUTPATIENT
Start: 2020-11-18 | End: 2020-11-19 | Stop reason: HOSPADM

## 2020-11-18 RX ORDER — ONDANSETRON 2 MG/ML
4 INJECTION INTRAMUSCULAR; INTRAVENOUS
Status: DISCONTINUED | OUTPATIENT
Start: 2020-11-18 | End: 2020-11-19 | Stop reason: HOSPADM

## 2020-11-18 RX ORDER — DIAZEPAM 5 MG
5 TABLET ORAL EVERY 30 MIN PRN
Status: DISCONTINUED | OUTPATIENT
Start: 2020-11-18 | End: 2020-11-19 | Stop reason: HOSPADM

## 2020-11-18 RX ORDER — REGADENOSON 0.08 MG/ML
0.4 INJECTION, SOLUTION INTRAVENOUS ONCE
Status: COMPLETED | OUTPATIENT
Start: 2020-11-18 | End: 2020-11-18

## 2020-11-18 RX ORDER — DIPHENHYDRAMINE HCL 25 MG
25 CAPSULE ORAL
Status: DISCONTINUED | OUTPATIENT
Start: 2020-11-18 | End: 2020-11-19 | Stop reason: HOSPADM

## 2020-11-18 RX ORDER — GADOBUTROL 604.72 MG/ML
26 INJECTION INTRAVENOUS ONCE
Status: COMPLETED | OUTPATIENT
Start: 2020-11-18 | End: 2020-11-18

## 2020-11-18 RX ORDER — AMINOPHYLLINE 25 MG/ML
100 INJECTION, SOLUTION INTRAVENOUS ONCE
Status: DISCONTINUED | OUTPATIENT
Start: 2020-11-18 | End: 2020-11-19 | Stop reason: HOSPADM

## 2020-11-18 RX ORDER — DIPHENHYDRAMINE HYDROCHLORIDE 50 MG/ML
25-50 INJECTION INTRAMUSCULAR; INTRAVENOUS
Status: DISCONTINUED | OUTPATIENT
Start: 2020-11-18 | End: 2020-11-19 | Stop reason: HOSPADM

## 2020-11-18 RX ORDER — CAFFEINE CITRATE 20 MG/ML
60 SOLUTION INTRAVENOUS
Status: DISCONTINUED | OUTPATIENT
Start: 2020-11-18 | End: 2020-11-19 | Stop reason: HOSPADM

## 2020-11-18 RX ORDER — METHYLPREDNISOLONE SODIUM SUCCINATE 125 MG/2ML
125 INJECTION, POWDER, LYOPHILIZED, FOR SOLUTION INTRAMUSCULAR; INTRAVENOUS
Status: DISCONTINUED | OUTPATIENT
Start: 2020-11-18 | End: 2020-11-19 | Stop reason: HOSPADM

## 2020-11-18 RX ORDER — ACYCLOVIR 200 MG/1
0-1 CAPSULE ORAL
Status: DISCONTINUED | OUTPATIENT
Start: 2020-11-18 | End: 2020-11-19 | Stop reason: HOSPADM

## 2020-11-18 RX ADMIN — REGADENOSON 0.4 MG: 0.08 INJECTION, SOLUTION INTRAVENOUS at 11:45

## 2020-11-18 RX ADMIN — GADOBUTROL 26 ML: 604.72 INJECTION INTRAVENOUS at 12:29

## 2020-11-18 NOTE — PROGRESS NOTES
Pt present for cardiac stress MRI. Completed test and c/o 3/10 chest pressure after Lexiscan administration. Describes pressure as similar to the symptoms he has been having. Dr. Sanchez notified and post ECG obtained. VSS. Pt stated chest pressure has resolved and rates pain 0/10. Ok to discharge home. Pt instructed to seek medical attention for CP or additional symptoms.

## 2020-11-19 NOTE — PROGRESS NOTES
Satish Sanchez MD Gaustad, Kristine; Juliana Marcano             Stress protocol    Contrast administered per weight based protocol.

## 2020-11-24 DIAGNOSIS — F41.9 ANXIETY: ICD-10-CM

## 2020-11-25 NOTE — TELEPHONE ENCOUNTER
Controlled Substance Refill Request for clonazepam  Problem List Complete:  No     PROVIDER TO CONSIDER COMPLETION OF PROBLEM LIST AND OVERVIEW/CONTROLLED SUBSTANCE AGREEMENT    Last Written Prescription Date:  7/1/20  Last Fill Quantity: 20,   # refills: 0      Last Office Visit with [facility] primary care provider: 10/23/20-office visit      Future Office visit:     Controlled substance agreement:   Encounter-Level CSA:    There are no encounter-level csa.     Patient-Level CSA:    There are no patient-level csa.         Last Urine Drug Screen: No results found for: CDAUT, No results found for: COMDAT, No results found for: THC13, PCP13, COC13, MAMP13, OPI13, AMP13, BZO13, TCA13, MTD13, BAR13, OXY13, PPX13, BUP13     Processing:  Rx to be electronically transmitted to pharmacy by provider      [email or web address]       checked in past 3 months?  Yes checked-  no concerns    Last filled 7/1/20

## 2020-11-27 RX ORDER — CLONAZEPAM 0.5 MG/1
0.5 TABLET ORAL 2 TIMES DAILY PRN
Qty: 20 TABLET | Refills: 0 | Status: SHIPPED | OUTPATIENT
Start: 2020-11-27 | End: 2021-04-27

## 2020-12-02 ENCOUNTER — TELEPHONE (OUTPATIENT)
Dept: CARDIOLOGY | Facility: CLINIC | Age: 60
End: 2020-12-02

## 2020-12-02 NOTE — TELEPHONE ENCOUNTER
Patient called in with some questions concerning his c-MRI that was done recently. Specifically, he had questions about the EDV number where one value was a bit out of ranger. I pointed out to him that these number are not static measurements but dynamic and can change from moment to moment.  I reviewed the entire study with him and told him that both ventricles are pumping normally and the stress portion showed normal blood flow as well. There was no sigh of infection or inflammation as well.  He will talk to jas SAMS on 12/16 who will review the study in more detail with him.

## 2020-12-15 ENCOUNTER — VIRTUAL VISIT (OUTPATIENT)
Dept: INTERNAL MEDICINE | Facility: CLINIC | Age: 60
End: 2020-12-15
Payer: COMMERCIAL

## 2020-12-15 DIAGNOSIS — R07.89 CHEST DISCOMFORT: ICD-10-CM

## 2020-12-15 DIAGNOSIS — F41.9 ANXIETY: Primary | ICD-10-CM

## 2020-12-15 DIAGNOSIS — E78.5 HYPERLIPIDEMIA LDL GOAL <100: ICD-10-CM

## 2020-12-15 PROCEDURE — 99214 OFFICE O/P EST MOD 30 MIN: CPT | Mod: 95 | Performed by: INTERNAL MEDICINE

## 2020-12-15 RX ORDER — CITALOPRAM HYDROBROMIDE 20 MG/1
20 TABLET ORAL DAILY
Qty: 90 TABLET | Refills: 3 | Status: SHIPPED | OUTPATIENT
Start: 2020-12-15 | End: 2021-01-28

## 2020-12-15 NOTE — PROGRESS NOTES
"Amado Veliz is a 60 year old male who is being evaluated via a billable video visit.      The patient has been notified of following:     \"This video visit will be conducted via a call between you and your physician/provider. We have found that certain health care needs can be provided without the need for an in-person physical exam.  This service lets us provide the care you need with a video conversation.  If a prescription is necessary we can send it directly to your pharmacy.  If lab work is needed we can place an order for that and you can then stop by our lab to have the test done at a later time.    Video visits are billed at different rates depending on your insurance coverage.  Please reach out to your insurance provider with any questions.    If during the course of the call the physician/provider feels a video visit is not appropriate, you will not be charged for this service.\"    Patient has given verbal consent for Video visit? Yes  How would you like to obtain your AVS? Mail a copy  If you are dropped from the video visit, the video invite should be resent to: Text to cell phone: [phone]  Will anyone else be joining your video visit? No      Subjective     Amado Veliz is a 60 year old male who presents today via video visit for the following health issues:  Patient is being seen for an follow up ( hypertension and lipids).  HPI     Hyperlipidemia Follow-Up      Are you regularly taking any medication or supplement to lower your cholesterol?   Yes- Atorvastatin    Are you having muscle aches or other side effects that you think could be caused by your cholesterol lowering medication?  No    Hypertension Follow-up      Do you check your blood pressure regularly outside of the clinic? Yes     Are you following a low salt diet? Yes    Are your blood pressures ever more than 140 on the top number (systolic) OR more   than 90 on the bottom number (diastolic), for example 140/90? No      How " "many servings of fruits and vegetables do you eat daily?  4 or more    On average, how many sweetened beverages do you drink each day (Examples: soda, juice, sweet tea, etc.  Do NOT count diet or artificially sweetened beverages)?   0    How many days per week do you exercise enough to make your heart beat faster? 6    How many minutes a day do you exercise enough to make your heart beat faster? 30 - 60    How many days per week do you miss taking your medication? 0     Video Start Time: 0832    The patient continues to be bothered with chest discomfort, although of a lesser severity and not nearly as intrusive into his activities as he had previously noted.  Also, he is feeling less anxious.  He finds clonazepam to be very helpful when used infrequently as needed.  He is uncertain as to whether citalopram has been helpful, although he does not note any troubling adverse effects from this medication.    He remains concerned about persistent and recurrent chest discomfort.  This is still described as a \"tightness and pressure\" in the chest, and is often still associated with exertion.  For example, it is worse with walking up a hill near his house when walking with his wife.  It tends to get better as he continues on a more level path and does not interrupt his walk.  He remains apprehensive about just how active he can comfortably remain.    He has a cardiology appointment coming up soon.  We discussed that he should ask them whether he has any exercise restrictions, as well as to inquire as to likelihood that his stent will or will not reocclude 8 months out from time of its placement.  We also reviewed his coronary anatomy in some detail.  We agreed that cardiology seems unlikely to be concerned about his chest discomfort to the degree that a repeat coronary angiogram would likely be recommended.  He will discuss with him his recent cardiac MRI which appears to have been favorable.    Past medical, family and " social histories as well as medications reviewed and updated as needed.    Review of Systems   REVIEW OF SYSTEMS: The following systems have been completely reviewed and are negative except as noted above:   Constitutional, respiratory, cardiovascular, psychiatric systems.        Objective       Vitals:  No vitals were obtained today due to virtual visit.    Physical Exam     GENERAL: Healthy, alert and no distress  EYES: Eyes grossly normal to inspection.  No discharge or erythema, or obvious scleral/conjunctival abnormalities.  RESP: No audible wheeze, cough, or visible cyanosis.  No visible retractions or increased work of breathing.    SKIN: Visible skin clear. No significant rash, abnormal pigmentation or lesions.  NEURO: Cranial nerves grossly intact.  Mentation and speech appropriate for age.  PSYCH: Mentation appears normal, affect normal/bright, judgement and insight intact, normal speech and appearance well-groomed.    Assessment & Plan   (F41.9) Anxiety  (primary encounter diagnosis)  Comment: Continue citalopram daily, and clonazepam infrequently as needed.   Plan: citalopram (CELEXA) 20 MG tablet          (R07.89) Chest discomfort  Comment: See pt instructions and epic orders. F/u with cardiology as planned.     (E78.5) Hyperlipidemia LDL goal <100  Comment: Continue current meds.           Patient Instructions   Ask cardiology:   A) whether you require any restrictions to be placed on your activity, and at what degree of symptoms should prompt you to again seek medical attention.    B) What is the likelihood at this point (8 months out) that your coronary stent may re-occlude.     I suspect that they are not concerned that your chest discomfort symptoms should need a repeat coronary angiogram at this time.     Recommend continuing with the citalopram for a while.   May continue to use clonazepam as needed.     See me back in about three months or so.       Follow up in three months (video or office  visit)    Ildefonso Lucas MD,   [facility]      Video-Visit Details    Type of service:  Video Visit    Video End Time: 0909    Originating Location (pt. Location): Home    Distant Location (provider location):  [facility]     Platform used for Video Visit: Martina

## 2020-12-16 ENCOUNTER — VIRTUAL VISIT (OUTPATIENT)
Dept: CARDIOLOGY | Facility: CLINIC | Age: 60
End: 2020-12-16
Attending: INTERNAL MEDICINE
Payer: COMMERCIAL

## 2020-12-16 VITALS — BODY MASS INDEX: 28.36 KG/M2 | HEIGHT: 74 IN

## 2020-12-16 DIAGNOSIS — F41.9 ANXIETY: ICD-10-CM

## 2020-12-16 DIAGNOSIS — R07.89 ATYPICAL CHEST PAIN: Primary | ICD-10-CM

## 2020-12-16 DIAGNOSIS — E78.2 MIXED HYPERLIPIDEMIA: ICD-10-CM

## 2020-12-16 PROCEDURE — 99214 OFFICE O/P EST MOD 30 MIN: CPT | Mod: 95 | Performed by: NURSE PRACTITIONER

## 2020-12-16 NOTE — PATIENT INSTRUCTIONS
Ask cardiology:   A) whether you require any restrictions to be placed on your activity, and at what degree of symptoms should prompt you to again seek medical attention.    B) What is the likelihood at this point (8 months out) that your coronary stent may re-occlude.     I suspect that they are not concerned that your chest discomfort symptoms should need a repeat coronary angiogram at this time.     Recommend continuing with the citalopram for a while.   May continue to use clonazepam as needed.     See me back in about three months or so.

## 2020-12-16 NOTE — PATIENT INSTRUCTIONS
Thanks for participating in a video visit with the [facility] today.   Improvement in chest pain with colchicine  Follow up stress MRI - Normal  Anxiety controlled  Blood pressures well controlled  Continue with current medical therapy.   Exercise, weight loss, and heart healthy diet.     I will discuss with Dr. Wayne duration of colchicine and follow up via telephone.     Please call my nurse at [phone] with any questions or concerns.    Scheduling phone number: [phone]  Reminder: Please bring in all current medications, over the counter supplements and vitamin bottles to your next appointment.

## 2020-12-16 NOTE — PROGRESS NOTES
"Amado Veliz is a 60 year old male who is being evaluated via a billable video visit.      The patient has been notified of following:     \"This video visit will be conducted via a call between you and your physician/provider. We have found that certain health care needs can be provided without the need for an in-person physical exam.  This service lets us provide the care you need with a video conversation.  If a prescription is necessary we can send it directly to your pharmacy.  If lab work is needed we can place an order for that and you can then stop by our lab to have the test done at a later time.    Video visits are billed at different rates depending on your insurance coverage.  Please reach out to your insurance provider with any questions.    If during the course of the call the physician/provider feels a video visit is not appropriate, you will not be charged for this service.\"    Patient has given verbal consent for Video visit? Yes  How would you like to obtain your AVS? MyChart  If you are dropped from the video visit, the video invite should be resent to: Send to e-mail at: [email or web address]  Will anyone else be joining your video visit? No        Review Of Systems  Skin: NEGATIVE  Eyes:Ears/Nose/Throat: NEGATIVE  Respiratory: NEGATIVE  Cardiovascular: Occ chest pain, less than it was.  Only notices with exertion. States he walks 2-3 miles and experiences chest pain during that time or when he is going up an incline. Occ lightheaded/dizziness with positional changes.  Gastrointestinal: NEGATIVE  Genitourinary:NEGATIVE Musculoskeletal: NEGATIVE  Neurologic: NEGATIVE  Psychiatric: NEGATIVE  Hematologic/Lymphatic/Immunologic: NEGATIVE  Endocrine:  NEGATIVE    Vitals - Patient Reported  Systolic (Patient Reported): 112  Diastolic (Patient Reported): 71  Weight (Patient Reported): 97.5 kg (215 lb)  Height (Patient Reported): 188 cm (6' 2\")  Pulse (Patient Reported): 53    Telephone number of " patient: [phone]    BERNARDA Jim(AAMA) 12/16/20      CARDIOLOGY CLINIC VIDEO VISIT      I have reviewed and updated the patient's Past Medical History, Social History, Family History and Medication List.    MEDICATIONS:  Current Outpatient Medications   Medication Sig Dispense Refill     aspirin (ASA) 81 MG EC tablet Take 1 tablet (81 mg) by mouth daily 90 tablet 3     atorvastatin (LIPITOR) 20 MG tablet Take 1 tablet (20 mg) by mouth every evening 90 tablet 3     citalopram (CELEXA) 20 MG tablet Take 1 tablet (20 mg) by mouth daily 90 tablet 3     clonazePAM (KLONOPIN) 0.5 MG tablet Take 1 tablet (0.5 mg) by mouth 2 times daily as needed for anxiety (Use infrequently for more acute anxiety) 20 tablet 0     clopidogrel (PLAVIX) 75 MG tablet Take 1 tablet (75 mg) by mouth daily 90 tablet 3     colchicine (MITIGARE) 0.6 MG capsule Take 1 capsule (0.6 mg) by mouth daily Take 1 capsule (0.6mg) by mouth TWICE daily for 4 weeks and then 0.6 mg ONCE daily for another 4 weeks 90 capsule 1     metoprolol succinate ER (TOPROL-XL) 25 MG 24 hr tablet Take 0.5 tablets (12.5 mg) by mouth daily 90 tablet 1     nitroGLYcerin (NITROSTAT) 0.4 MG sublingual tablet For chest pain place 1 tablet under the tongue every 5 minutes for 3 doses. If symptoms persist 5 minutes after 1st dose call 911. 30 tablet 0     traZODone (DESYREL) 50 MG tablet Take 0.5-2 tablets ( mg) by mouth nightly as needed for sleep 60 tablet 3       ALLERGIES  Niacin    Brief physical exam:  General: In no acute distress, upright and calm.  Eyes: No apparent redness or discharge.   Chest: No labored breathing, no cough during exam or audible wheezing.   Neuro: No obvious focal defects or tremors.   Psych: Alert and oriented. Does not appear anxious.     The rest of a comprehensive physical examination is deferred due to public health emergency video visit restrictions.       Primary cardiologist: Dr. Wayne      HPI:  Amado Veliz is a pleasant  60 year old patient who carries a past medical history significant for coronary artery disease status post drug-eluting stent 4.0 x 16 mm Synergy to the proximal RCA (4/2020), hyperlipidemia, anxiety and remote tobacco abuse.    He was last evaluated in a virtual visit on 10/27/2020.  At that time, he reported an episode of chest pain while shoveling as well as frequent atypical chest pain.  Since his PCI in April of this year, he has undergone a significant amount of work-up for evaluation of chest pain.  Mildly abnormal stress test in May of this year, normal ESR and CRP, mild improvement with short course of steroid and Advil/colchicine.  For further evaluation of ischemia, he underwent a stress cardiac MRI that demonstrated normal biventricular size and function, normal LV ejection fraction estimated at 63%, normal RV ejection fraction estimated at 60%, negative stress-induced perfusion study, no scar or evidence of infiltrative disease or pericardial hyperenhancement. Due to some mild improvement with colchicine, he was restarted on a 2 month course. He returns today for a virtual visit for review of results.     He is feeling well on a cardiac standpoint with the exception of occasional exertional chest pain primarily with inclines.    Since starting on colchicine, episodes of chest pain are infrequent. He denies shortness of breath, palpitations, PND, orthopnea, presyncope, syncope, or lower extremity edema.  He admits anxiety is better controlled    Blood pressure and heart rates are well controlled on low-dose metoprolol.  Continues on dual antiplatelet therapy (Plavix and aspirin).     Activity level is unchanged  Compliant with all medications.        Assessment/Plan:    1. CAD -status post drug-eluting stent 4.0 x 16 mm Synergy to the proximal RCA (4/2020).  Since PCI, negative stress test, GI work-up, and most recent negative cardiac stress MRI.  Episodes of chest tightness with inclines only.   Significant improvement after starting colchicine.  Continue daily for 1 additional week.  I will review with Dr. Nickerson duration of colchicine.  Encourage continued exercise, weight loss, and heart healthy diet.    2.  Hyperlipidemia -LDL at goal, continue on atorvastatin  3.  Anxiety-managed on daily citalopram and as needed clonazepam    Follow up plan: I will discuss with Dr. Nickerson duration of colchicine.  Follow-up pending recommendations.          Video-Visit Details    Type of service:  Video Visit    Video Start Time: 10:30 am  Video End Time: 10:55am    Originating Location (pt. Location): Home    Distant Location (provider location):  [facility]- Home office    Platform used for Video Visit: GLORIA Roberson, CNP  Pager [phone]  12/16/2020

## 2020-12-16 NOTE — LETTER
"12/16/2020    Ildefonso Lucas MD, MD  [address] [facility] [address]  [facility] [address]    RE: Amado LEIDY Veliz       Dear Colleague,    I had the pleasure of seeing Amado Veliz in the [facility].    Amado Veliz is a 60 year old male who is being evaluated via a billable video visit.      The patient has been notified of following:     \"This video visit will be conducted via a call between you and your physician/provider. We have found that certain health care needs can be provided without the need for an in-person physical exam.  This service lets us provide the care you need with a video conversation.  If a prescription is necessary we can send it directly to your pharmacy.  If lab work is needed we can place an order for that and you can then stop by our lab to have the test done at a later time.    Video visits are billed at different rates depending on your insurance coverage.  Please reach out to your insurance provider with any questions.    If during the course of the call the physician/provider feels a video visit is not appropriate, you will not be charged for this service.\"    Patient has given verbal consent for Video visit? Yes  How would you like to obtain your AVS? MyChart  If you are dropped from the video visit, the video invite should be resent to: Send to e-mail at: [email or web address]  Will anyone else be joining your video visit? No        Review Of Systems  Skin: NEGATIVE  Eyes:Ears/Nose/Throat: NEGATIVE  Respiratory: NEGATIVE  Cardiovascular: Occ chest pain, less than it was.  Only notices with exertion. States he walks 2-3 miles and experiences chest pain during that time or when he is going up an incline. Occ lightheaded/dizziness with positional changes.  Gastrointestinal: NEGATIVE  Genitourinary:NEGATIVE Musculoskeletal: NEGATIVE  Neurologic: NEGATIVE  Psychiatric: NEGATIVE  Hematologic/Lymphatic/Immunologic: NEGATIVE  Endocrine:  NEGATIVE    Vitals " "- Patient Reported  Systolic (Patient Reported): 112  Diastolic (Patient Reported): 71  Weight (Patient Reported): 97.5 kg (215 lb)  Height (Patient Reported): 188 cm (6' 2\")  Pulse (Patient Reported): 53    Telephone number of patient: [phone]    BERNARDA Jim(AAMA) 12/16/20      CARDIOLOGY CLINIC VIDEO VISIT      I have reviewed and updated the patient's Past Medical History, Social History, Family History and Medication List.    MEDICATIONS:  Current Outpatient Medications   Medication Sig Dispense Refill     aspirin (ASA) 81 MG EC tablet Take 1 tablet (81 mg) by mouth daily 90 tablet 3     atorvastatin (LIPITOR) 20 MG tablet Take 1 tablet (20 mg) by mouth every evening 90 tablet 3     citalopram (CELEXA) 20 MG tablet Take 1 tablet (20 mg) by mouth daily 90 tablet 3     clonazePAM (KLONOPIN) 0.5 MG tablet Take 1 tablet (0.5 mg) by mouth 2 times daily as needed for anxiety (Use infrequently for more acute anxiety) 20 tablet 0     clopidogrel (PLAVIX) 75 MG tablet Take 1 tablet (75 mg) by mouth daily 90 tablet 3     colchicine (MITIGARE) 0.6 MG capsule Take 1 capsule (0.6 mg) by mouth daily Take 1 capsule (0.6mg) by mouth TWICE daily for 4 weeks and then 0.6 mg ONCE daily for another 4 weeks 90 capsule 1     metoprolol succinate ER (TOPROL-XL) 25 MG 24 hr tablet Take 0.5 tablets (12.5 mg) by mouth daily 90 tablet 1     nitroGLYcerin (NITROSTAT) 0.4 MG sublingual tablet For chest pain place 1 tablet under the tongue every 5 minutes for 3 doses. If symptoms persist 5 minutes after 1st dose call 911. 30 tablet 0     traZODone (DESYREL) 50 MG tablet Take 0.5-2 tablets ( mg) by mouth nightly as needed for sleep 60 tablet 3       ALLERGIES  Niacin    Brief physical exam:  General: In no acute distress, upright and calm.  Eyes: No apparent redness or discharge.   Chest: No labored breathing, no cough during exam or audible wheezing.   Neuro: No obvious focal defects or tremors.   Psych: Alert and oriented. " Does not appear anxious.     The rest of a comprehensive physical examination is deferred due to public [facility] emergency video visit restrictions.       Primary cardiologist: Dr. Wayne      HPI:  Amado Veliz is a pleasant 60 year old patient who carries a past medical history significant for coronary artery disease status post drug-eluting stent 4.0 x 16 mm Synergy to the proximal RCA (4/2020), hyperlipidemia, anxiety and remote tobacco abuse.    He was last evaluated in a virtual visit on 10/27/2020.  At that time, he reported an episode of chest pain while shoveling as well as frequent atypical chest pain.  Since his PCI in April of this year, he has undergone a significant amount of work-up for evaluation of chest pain.  Mildly abnormal stress test in May of this year, normal ESR and CRP, mild improvement with short course of steroid and Advil/colchicine.  For further evaluation of ischemia, he underwent a stress cardiac MRI that demonstrated normal biventricular size and function, normal LV ejection fraction estimated at 63%, normal RV ejection fraction estimated at 60%, negative stress-induced perfusion study, no scar or evidence of infiltrative disease or pericardial hyperenhancement. Due to some mild improvement with colchicine, he was restarted on a 2 month course. He returns today for a virtual visit for review of results.     He is feeling well on a cardiac standpoint with the exception of occasional exertional chest pain primarily with inclines.    Since starting on colchicine, episodes of chest pain are infrequent. He denies shortness of breath, palpitations, PND, orthopnea, presyncope, syncope, or lower extremity edema.  He admits anxiety is better controlled    Blood pressure and heart rates are well controlled on low-dose metoprolol.  Continues on dual antiplatelet therapy (Plavix and aspirin).     Activity level is unchanged  Compliant with all medications.      Assessment/Plan:    1. CAD  -status post drug-eluting stent 4.0 x 16 mm Synergy to the proximal RCA (4/2020).  Since PCI, negative stress test, GI work-up, and most recent negative cardiac stress MRI.  Episodes of chest tightness with inclines only.  Significant improvement after starting colchicine.  Continue daily for 1 additional week.  I will review with Dr. Nickerson duration of colchicine.  Encourage continued exercise, weight loss, and heart healthy diet.    2.  Hyperlipidemia -LDL at goal, continue on atorvastatin  3.  Anxiety-managed on daily citalopram and as needed clonazepam    Follow up plan: I will discuss with Dr. Nickerson duration of colchicine.  Follow-up pending recommendations.        Video-Visit Details    Type of service:  Video Visit    Video Start Time: 10:30 am  Video End Time: 10:55am    Originating Location (pt. Location): Home    Distant Location (provider location):  [facility]- Home office    Platform used for Video Visit: GLORIA Roberson, NAOMY  Pager [phone]  12/16/2020       Thank you for allowing me to participate in the care of your patient.    Sincerely,     GLORIA Paz CNP     [facility]

## 2020-12-17 ENCOUNTER — TELEPHONE (OUTPATIENT)
Dept: CARDIOLOGY | Facility: CLINIC | Age: 60
End: 2020-12-17

## 2020-12-17 NOTE — TELEPHONE ENCOUNTER
----- Message from GLORIA Paz CNP sent at 12/16/2020 11:11 AM CST -----  Patient is requesting a copy of his nuclear stress test and cardiac cath.   Could we send him a copy please?    Thanks,   Janene

## 2020-12-22 ENCOUNTER — TELEPHONE (OUTPATIENT)
Dept: CARDIOLOGY | Facility: CLINIC | Age: 60
End: 2020-12-22

## 2020-12-22 DIAGNOSIS — I25.10 CORONARY ARTERY DISEASE INVOLVING NATIVE CORONARY ARTERY OF NATIVE HEART WITHOUT ANGINA PECTORIS: ICD-10-CM

## 2020-12-22 DIAGNOSIS — R07.89 ATYPICAL CHEST PAIN: Primary | ICD-10-CM

## 2020-12-22 DIAGNOSIS — R07.89 ATYPICAL CHEST PAIN: ICD-10-CM

## 2020-12-22 RX ORDER — COLCHICINE 0.6 MG/1
0.6 CAPSULE ORAL DAILY
Qty: 60 CAPSULE | Refills: 0 | Status: SHIPPED | OUTPATIENT
Start: 2020-12-22 | End: 2021-03-02

## 2020-12-22 NOTE — TELEPHONE ENCOUNTER
Called pt with recommendations from Janene Mckeon NP. Pt states he is concerned about the interaction between atorvastatin & colchicine. He states he was told it was OK to take together for a few months but not for more than that. Will message Janene Mckeon NP to review. Elia VANG

## 2020-12-22 NOTE — TELEPHONE ENCOUNTER
Reviewed duration of colchciine with Dr. Wayne.   Please have patient continue colchicine for an additional 2 months.   Follow up in 3 months with [facility] after 1 month wash out.

## 2020-12-22 NOTE — TELEPHONE ENCOUNTER
Called pt with recommendations from Janene Mckeon NP. Prescription escripted to [facility] as requested & order placed to [facility] in 3 months. Elia VANG 12/22/20

## 2020-12-31 ENCOUNTER — TELEPHONE (OUTPATIENT)
Dept: CARDIOLOGY | Facility: CLINIC | Age: 60
End: 2020-12-31

## 2020-12-31 NOTE — TELEPHONE ENCOUNTER
Received a message back from Dr. Wayne recommending he can continue to take advil as needed. He can also increase the dose of colchacine 0.6 mg to twice per day for one week and then go back to daily. Will send a [employer] message with these recommendations.

## 2020-12-31 NOTE — TELEPHONE ENCOUNTER
Received a voice message stating he doesn't feel the colchicine is working.    Called back and spoke with him. Had been tolerating the colchicine okay at visit with Janene Mckeon, [facility] on 12/22/20 virtual visit. However over the past week , noted increased sharp shooting pain with making bed and bending over.     Complained of tight chest pressure noted while out walking up a long, sloping hill. Goes on walks daily. Some walks he does not do the hill and does not note the chest pressure, states same pressure he has been experiencing over the past several months. States he had tried NGT for this, when it first occurred late spring/early summer and the NGT did nothing for the discomfort.    On Monday, 12/28/20, he did take 2 advil in the afternoon, this was in addition to his regular dose of colchicine which he takes at 6 AM every morning. He said he felt GREAT after that. No chest pains, sharp or pressure and he slept so good that night.   Aware he should not be taking the Advil routinely due to CAD with stent placed in April of 2020.    Questioning if he can to take colchicine twice per day, or the addition of Prednisone as previously discussed with Dr. Wayne, or if he should take Advil as needed for the discomfort.    Will message Dr. Wayne to review and advise and get beck to him with his recommendation.

## 2021-01-14 ENCOUNTER — HEALTH MAINTENANCE LETTER (OUTPATIENT)
Age: 61
End: 2021-01-14

## 2021-01-28 ENCOUNTER — VIRTUAL VISIT (OUTPATIENT)
Dept: PSYCHOLOGY | Facility: CLINIC | Age: 61
End: 2021-01-28
Payer: COMMERCIAL

## 2021-01-28 DIAGNOSIS — F41.1 GAD (GENERALIZED ANXIETY DISORDER): Primary | ICD-10-CM

## 2021-01-28 DIAGNOSIS — F41.9 ANXIETY: Primary | ICD-10-CM

## 2021-01-28 PROCEDURE — 90791 PSYCH DIAGNOSTIC EVALUATION: CPT | Mod: 52 | Performed by: PSYCHOLOGIST

## 2021-01-28 PROCEDURE — 99204 OFFICE O/P NEW MOD 45 MIN: CPT | Mod: 95 | Performed by: PSYCHIATRY & NEUROLOGY

## 2021-01-28 RX ORDER — SERTRALINE HYDROCHLORIDE 100 MG/1
TABLET, FILM COATED ORAL
Qty: 28 TABLET | Refills: 0 | Status: SHIPPED | OUTPATIENT
Start: 2021-01-28 | End: 2021-03-15

## 2021-01-28 RX ORDER — SERTRALINE HYDROCHLORIDE 100 MG/1
100 TABLET, FILM COATED ORAL DAILY
Qty: 30 TABLET | Refills: 1 | Status: SHIPPED | OUTPATIENT
Start: 2021-02-25 | End: 2021-03-15

## 2021-01-28 SDOH — SOCIAL STABILITY: SOCIAL NETWORK
DO YOU BELONG TO ANY CLUBS OR ORGANIZATIONS SUCH AS CHURCH GROUPS UNIONS, FRATERNAL OR ATHLETIC GROUPS, OR SCHOOL GROUPS?: NOT ASKED

## 2021-01-28 SDOH — HEALTH STABILITY: MENTAL HEALTH
STRESS IS WHEN SOMEONE FEELS TENSE, NERVOUS, ANXIOUS, OR CAN'T SLEEP AT NIGHT BECAUSE THEIR MIND IS TROUBLED. HOW STRESSED ARE YOU?: TO SOME EXTENT

## 2021-01-28 SDOH — SOCIAL STABILITY: SOCIAL NETWORK: HOW OFTEN DO YOU GET TOGETHER WITH FRIENDS OR RELATIVES?: NOT ASKED

## 2021-01-28 SDOH — SOCIAL STABILITY: SOCIAL INSECURITY: WITHIN THE LAST YEAR, HAVE YOU BEEN HUMILIATED OR EMOTIONALLY ABUSED IN OTHER WAYS BY YOUR PARTNER OR EX-PARTNER?: NO

## 2021-01-28 SDOH — ECONOMIC STABILITY: FOOD INSECURITY: WITHIN THE PAST 12 MONTHS, YOU WORRIED THAT YOUR FOOD WOULD RUN OUT BEFORE YOU GOT MONEY TO BUY MORE.: NEVER TRUE

## 2021-01-28 SDOH — SOCIAL STABILITY: SOCIAL INSECURITY: WITHIN THE LAST YEAR, HAVE YOU BEEN AFRAID OF YOUR PARTNER OR EX-PARTNER?: NO

## 2021-01-28 SDOH — HEALTH STABILITY: PHYSICAL HEALTH: ON AVERAGE, HOW MANY MINUTES DO YOU ENGAGE IN EXERCISE AT THIS LEVEL?: 60 MIN

## 2021-01-28 SDOH — ECONOMIC STABILITY: TRANSPORTATION INSECURITY
IN THE PAST 12 MONTHS, HAS LACK OF TRANSPORTATION KEPT YOU FROM MEETINGS, WORK, OR FROM GETTING THINGS NEEDED FOR DAILY LIVING?: NO

## 2021-01-28 SDOH — SOCIAL STABILITY: SOCIAL INSECURITY
WITHIN THE LAST YEAR, HAVE TO BEEN RAPED OR FORCED TO HAVE ANY KIND OF SEXUAL ACTIVITY BY YOUR PARTNER OR EX-PARTNER?: NO

## 2021-01-28 SDOH — ECONOMIC STABILITY: FOOD INSECURITY: WITHIN THE PAST 12 MONTHS, THE FOOD YOU BOUGHT JUST DIDN'T LAST AND YOU DIDN'T HAVE MONEY TO GET MORE.: NEVER TRUE

## 2021-01-28 SDOH — SOCIAL STABILITY: SOCIAL INSECURITY
WITHIN THE LAST YEAR, HAVE YOU BEEN KICKED, HIT, SLAPPED, OR OTHERWISE PHYSICALLY HURT BY YOUR PARTNER OR EX-PARTNER?: NO

## 2021-01-28 SDOH — HEALTH STABILITY: PHYSICAL HEALTH: ON AVERAGE, HOW MANY DAYS PER WEEK DO YOU ENGAGE IN MODERATE TO STRENUOUS EXERCISE (LIKE A BRISK WALK)?: 7 DAYS

## 2021-01-28 SDOH — SOCIAL STABILITY: SOCIAL NETWORK: ARE YOU MARRIED, WIDOWED, DIVORCED, SEPARATED, NEVER MARRIED, OR LIVING WITH A PARTNER?: NOT ASKED

## 2021-01-28 SDOH — SOCIAL STABILITY: SOCIAL NETWORK: HOW OFTEN DO YOU ATTENT MEETINGS OF THE CLUB OR ORGANIZATION YOU BELONG TO?: NOT ASKED

## 2021-01-28 SDOH — SOCIAL STABILITY: SOCIAL NETWORK: HOW OFTEN DO YOU ATTEND CHURCH OR RELIGIOUS SERVICES?: NOT ASKED

## 2021-01-28 SDOH — ECONOMIC STABILITY: TRANSPORTATION INSECURITY
IN THE PAST 12 MONTHS, HAS THE LACK OF TRANSPORTATION KEPT YOU FROM MEDICAL APPOINTMENTS OR FROM GETTING MEDICATIONS?: NO

## 2021-01-28 SDOH — ECONOMIC STABILITY: INCOME INSECURITY: HOW HARD IS IT FOR YOU TO PAY FOR THE VERY BASICS LIKE FOOD, HOUSING, MEDICAL CARE, AND HEATING?: NOT HARD AT ALL

## 2021-01-28 SDOH — SOCIAL STABILITY: SOCIAL NETWORK: IN A TYPICAL WEEK, HOW MANY TIMES DO YOU TALK ON THE PHONE WITH FAMILY, FRIENDS, OR NEIGHBORS?: NOT ASKED

## 2021-01-28 ASSESSMENT — COLUMBIA-SUICIDE SEVERITY RATING SCALE - C-SSRS
2. HAVE YOU ACTUALLY HAD ANY THOUGHTS OF KILLING YOURSELF?: NO
4. HAVE YOU HAD THESE THOUGHTS AND HAD SOME INTENTION OF ACTING ON THEM?: NO
1. IN THE PAST MONTH, HAVE YOU WISHED YOU WERE DEAD OR WISHED YOU COULD GO TO SLEEP AND NOT WAKE UP?: NO
5. HAVE YOU STARTED TO WORK OUT OR WORKED OUT THE DETAILS OF HOW TO KILL YOURSELF? DO YOU INTEND TO CARRY OUT THIS PLAN?: NO
5. HAVE YOU STARTED TO WORK OUT OR WORKED OUT THE DETAILS OF HOW TO KILL YOURSELF? DO YOU INTEND TO CARRY OUT THIS PLAN?: NO
1. IN THE PAST MONTH, HAVE YOU WISHED YOU WERE DEAD OR WISHED YOU COULD GO TO SLEEP AND NOT WAKE UP?: NO
4. HAVE YOU HAD THESE THOUGHTS AND HAD SOME INTENTION OF ACTING ON THEM?: NO
3. HAVE YOU BEEN THINKING ABOUT HOW YOU MIGHT KILL YOURSELF?: NO
2. HAVE YOU ACTUALLY HAD ANY THOUGHTS OF KILLING YOURSELF LIFETIME?: NO

## 2021-01-28 NOTE — PROGRESS NOTES
"Amado Veliz is a 60 year old year old who is being evaluated via a billable video visit.      How would you like to obtain your AVS? MyChart  If you are dropped from the video visit, the video invite should be resent to: Text to cell phone: see Epic  Will anyone else be joining your video visit? No       Telemedicine Visit: The patient's condition can be safely assessed and treated via synchronous audio and visual telemedicine encounter.      Reason for Telemedicine Visit: Covid-19 Pandemic    Originating Site (Patient Location): Patient's home     Distant Site (Provider Location): Provider Remote Setting    Mode of Communication:  Video Conference via Vinted    As the provider I attest to compliance with applicable laws and regulations related to telemedicine.                                                             Outpatient Psychiatric Evaluation- Standard  Adult    Name:  Amado Veliz  : 1960    Source of Referral:  Primary Care Provider: Ildefonso Lucas MD, MD   Current Psychotherapist: None    Identifying Data:  Patient is a 60 year old,   White American male  who presents for initial visit with me.  Patient is currently retired. Patient attended the phone/viseo session alone. Patient prefers to be called: \"Bassam\"    Chief Complaint:  Patient presents with:  Consult      HPI:  Amado Veliz is a 60 year old male with past history including anxiety, and atypical chest pains (s/p stent placement) who presents today for psychiatric assessment.     Patient with a long history of anxiety.  Identifies himself as a little bit of a worrier.  He started to have some cardiac issues with stent placement last spring.  He has had some atypical chest pains since stent placement.  He reports having at least 3 emergency room visits during which she had his chest pain was worked up.  All 3 visits turned up unremarkable cardiac work-ups.  He gets nervous when he gets the chest pains but " "something much more serious is going on with his heart.  Hard for him not to think about worse case scenario.  It also seems that because he gets a little anxious about psychosocial stressors, this can lead to some worsening chest pains.  He has been on Celexa 20 mg daily for many months.  He is not sure that it is been incredibly helpful.  He does however report some improvement in his chest pains.  He is not sure if that is because he is now being treated for pericarditis, a decrease in serious social/political events, or the medication.  He denies any negative side effects.  He denies any chronic pains other than his atypical chest pains he gets.  He has a prescription of clonazepam.  He reports the medication does help \"when chest pain has been there for a few hours and it ain't going away.\"  He only uses about once every couple of weeks because he does not want to become dependent or addicted on the medication.  He states he is no longer getting the chest pains daily, which is good.  Denies any overt depression or mood symptoms.  Denies suicidal ideation.  Is no longer taking trazodone at bedtime for sleep since that gave him bad side effects.  Reports he is currently sleeping okay.  No drug or alcohol use concerns.    Per [facility], Dr. Amauri Herrera, during today's team-based visit:  The reason for seeking services at this time is: \"My doctor recommended I come see you guys for anxiety.\" Had a stent put in last April and had lingering chest pain, COVID-19, \"and everything has been a load for me to not think about all the time.\" He feels chest pain and worries he will die with the next wave of chest pain. He noted that he has always been \"an excessive worrier. I think of obscure, low-probability things that could happen.\" [place] \"almost put me down.\" He found it hard to think, focus, and complete tasks. He was started on medications at that time. August of 2020 he " "believes he had a panic attack and went to the ER. He does not feel the citalopram has been helpful; clonazepam seems more helpful but knows he should not be take those too often. Patient has attempted to resolve these concerns in the past through medication and reading a self-help book.    Per primary care provider note 12/15/20:   The patient continues to be bothered with chest discomfort, although of a lesser severity and not nearly as intrusive into his activities as he had previously noted.  Also, he is feeling less anxious.  He finds clonazepam to be very helpful when used infrequently as needed.  He is uncertain as to whether citalopram has been helpful, although he does not note any troubling adverse effects from this medication.     He remains concerned about persistent and recurrent chest discomfort.  This is still described as a \"tightness and pressure\" in the chest, and is often still associated with exertion.  For example, it is worse with walking up a hill near his house when walking with his wife.  It tends to get better as he continues on a more level path and does not interrupt his walk.  He remains apprehensive about just how active he can comfortably remain.     He has a cardiology appointment coming up soon.  We discussed that he should ask them whether he has any exercise restrictions, as well as to inquire as to likelihood that his stent will or will not reocclude 8 months out from time of its placement.  We also reviewed his coronary anatomy in some detail.  We agreed that cardiology seems unlikely to be concerned about his chest discomfort to the degree that a repeat coronary angiogram would likely be recommended.  He will discuss with him his recent cardiac MRI which appears to have been favorable.    Past diagnoses include: Anxiety  Current medications include: has a current medication list which includes the following prescription(s): aspirin, atorvastatin, citalopram, clonazepam, " clopidogrel, colchicine, metoprolol succinate er, nitroglycerin, and trazodone.   Medication side effects: Yes: Worsening chest pain/sensations with trazodone so patient discontinued  Current stressors include: Symptoms and See HPI above  Coping mechanisms and supports include: Family, Hobbies and Friends    Psychiatric Review of Symptoms Per [facility], Dr. Amauri Herrera, during today's team-based visit:  Depression:     No symptoms  Marleni:             No Symptoms  Psychosis:       No Symptoms  Anxiety:           Excessive worry, Nervousness, Physical complaints, such as headaches, stomachaches, muscle tension, Sleep disturbance, Ruminations and Poor concentration  Panic:              No symptoms  Post Traumatic Stress Disorder:  No Symptoms   Eating Disorder:          No Symptoms  ADD / ADHD:              No symptoms  Conduct Disorder:       No symptoms  Autism Spectrum Disorder:     No symptoms  Obsessive Compulsive Disorder:       Cleaning, Symetry and Not current; In the past; mowing lawn had to be in straight lines, fastidious, everything in its place    Sleep: better now; awful last summer, 3-4 hours per night. Sleeping better now  Caffeine: quit when he got a stent. No soda  Tobacco: Quit 1993    All other ROS negative.     PHQ-9 scores:   PHQ-9 SCORE 9/8/2020   PHQ-9 Total Score 5       TAJ-7 scores:    TAJ-7 SCORE 9/8/2020 10/23/2020   Total Score 13 10       Medical Review of Systems:  10 systems (general, cardiovascular, respiratory, eyes, ENT, endocrine, GI, , M/S, neurological) were reviewed. Most pertinent finding(s) is/are: Chronic, intermittent atypical chest pains. The remaining systems are all unremarkable.    A 12-item WHODAS 2.0 assessment was not completed.    Psychiatric History:   Hospitalizations: None  History of Commitment? No   Past Treatment: medication(s) from physician / PCP  Suicide Attempts: No   Current Suicide Risk: Suicide Assessment Completed Today.  Self-injurious Behavior:  Denies  Electroconvulsive Therapy (ECT) or Transcranial Magnetic Stimulation (TMS): No   GeneSight Genetic Testing: No     Past medication trials include but are not limited to:   Valium  buspar - chest would throb for a few hours after  celexa-current, does not seem to be incredibly effective  trazodone    Substance Use History:  Current Use of Drugs/Alcohol: Denies   Past Use of Drugs/Alcohol: Social use of alcohol-does have DWI from the 1980s and quit drinking after that.  Denies any other major problems from drinking apart from the one DWI.  No drugs.  Patient reports no problems as a result of their drinking / drug use.  Except for DWI in 1980s.  Patient has not received chemical dependency treatment in the past  Recovery Programming Involvement: None    Tobacco use: History quit in 1993    Based on the clinical interview, there  are not indications of drug or alcohol abuse. Continue to monitor.   Discussed effect of substance use on overall health.     Past Medical History:  Past Medical History:   Diagnosis Date     Anxiety      CAD (coronary artery disease)     Status post stenting of proximal right coronary artery complex ruptured plaque on 23 April 2020.     Hyperlipidemia LDL goal <70      Intestinal infection due to Clostridium difficile      Pharyngoesophageal dysphagia       Surgery:   Past Surgical History:   Procedure Laterality Date     BACK SURGERY      L5-S1 disk herniation      CV CORONARY ANGIOGRAM N/A 4/23/2020    Procedure: Coronary Angiogram;  Surgeon: Derek Walker MD;  Location: [facility] CARDIAC CATH LAB     CV INTRAVASULAR ULTRASOUND N/A 4/23/2020    Procedure: Intravascular Ultrasound;  Surgeon: Derek Walker MD;  Location: [facility] CARDIAC CATH LAB     CV PCI STENT DRUG ELUTING       CV PCI STENT DRUG ELUTING N/A 4/23/2020    Procedure: Percutaneous Coronary Intervention Stent Drug Eluting;  Surgeon: Derek Walker MD;  Location: [facility] CARDIAC CATH LAB      Food and Medicine Allergies:     Allergies   Allergen Reactions     Niacin Unknown     Patient gets flushed and red in the face.     Seizures or Head Injury: No  Diet: Not discussed  Exercise: Not discussed  Supplements: Reviewed per Electronic Medical Record Today    Current Medications:    Current Outpatient Medications:      aspirin (ASA) 81 MG EC tablet, Take 1 tablet (81 mg) by mouth daily, Disp: 90 tablet, Rfl: 3     atorvastatin (LIPITOR) 20 MG tablet, Take 1 tablet (20 mg) by mouth every evening, Disp: 90 tablet, Rfl: 3     citalopram (CELEXA) 20 MG tablet, Take 1 tablet (20 mg) by mouth daily, Disp: 90 tablet, Rfl: 3     clonazePAM (KLONOPIN) 0.5 MG tablet, Take 1 tablet (0.5 mg) by mouth 2 times daily as needed for anxiety (Use infrequently for more acute anxiety), Disp: 20 tablet, Rfl: 0     clopidogrel (PLAVIX) 75 MG tablet, Take 1 tablet (75 mg) by mouth daily, Disp: 90 tablet, Rfl: 3     colchicine (MITIGARE) 0.6 MG capsule, Take 1 capsule (0.6 mg) by mouth daily for another 2 months, then stop, Disp: 60 capsule, Rfl: 0     metoprolol succinate ER (TOPROL-XL) 25 MG 24 hr tablet, Take 0.5 tablets (12.5 mg) by mouth daily, Disp: 90 tablet, Rfl: 1     nitroGLYcerin (NITROSTAT) 0.4 MG sublingual tablet, For chest pain place 1 tablet under the tongue every 5 minutes for 3 doses. If symptoms persist 5 minutes after 1st dose call 911., Disp: 30 tablet, Rfl: 0     traZODone (DESYREL) 50 MG tablet, Take 0.5-2 tablets ( mg) by mouth nightly as needed for sleep, Disp: 60 tablet, Rfl: 3    The [place] Prescription Monitoring Program has been reviewed and there are no concerns about diversionary activity for controlled substances at this time.      Vital Signs:  None since this is a phone/video visit.     Labs:  Most recent laboratory results reviewed and the pertinent results include:   Orders Only on 10/19/2020   Component Date Value Ref Range Status     Cholesterol 10/19/2020 134  <200 mg/dL Final      Triglycerides 10/19/2020 142  <150 mg/dL Final    Fasting specimen     HDL Cholesterol 10/19/2020 40  >39 mg/dL Final     LDL Cholesterol Calculated 10/19/2020 66  <100 mg/dL Final    Desirable:       <100 mg/dl     Non HDL Cholesterol 10/19/2020 94  <130 mg/dL Final     Sodium 10/19/2020 139  133 - 144 mmol/L Final     Potassium 10/19/2020 3.7  3.4 - 5.3 mmol/L Final     Chloride 10/19/2020 104  94 - 109 mmol/L Final     Carbon Dioxide 10/19/2020 31  20 - 32 mmol/L Final     Anion Gap 10/19/2020 4  3 - 14 mmol/L Final     Glucose 10/19/2020 89  70 - 99 mg/dL Final    Fasting specimen     Urea Nitrogen 10/19/2020 11  7 - 30 mg/dL Final     Creatinine 10/19/2020 0.74  0.66 - 1.25 mg/dL Final     GFR Estimate 10/19/2020 >90  >60 mL/min/[1.73_m2] Final    Comment: Non  GFR Calc  Starting 12/18/2018, serum creatinine based estimated GFR (eGFR) will be   calculated using the Chronic Kidney Disease Epidemiology Collaboration   (CKD-EPI) equation.       GFR Estimate If Black 10/19/2020 >90  >60 mL/min/[1.73_m2] Final    Comment:  GFR Calc  Starting 12/18/2018, serum creatinine based estimated GFR (eGFR) will be   calculated using the Chronic Kidney Disease Epidemiology Collaboration   (CKD-EPI) equation.       Calcium 10/19/2020 9.0  8.5 - 10.1 mg/dL Final     Bilirubin Total 10/19/2020 0.7  0.2 - 1.3 mg/dL Final     Albumin 10/19/2020 3.8  3.4 - 5.0 g/dL Final     Protein Total 10/19/2020 7.2  6.8 - 8.8 g/dL Final     Alkaline Phosphatase 10/19/2020 54  40 - 150 U/L Final     ALT 10/19/2020 48  0 - 70 U/L Final     AST 10/19/2020 25  0 - 45 U/L Final   Orders Only on 08/05/2020   Component Date Value Ref Range Status     Cholesterol 08/05/2020 176  <200 mg/dL Final     Triglycerides 08/05/2020 178* <150 mg/dL Final    Comment: Borderline high:  150-199 mg/dl  High:             200-499 mg/dl  Very high:       >499 mg/dl  Fasting specimen       HDL Cholesterol 08/05/2020 28* >39 mg/dL  Final     LDL Cholesterol Calculated 08/05/2020 112* <100 mg/dL Final    Comment: Above desirable:  100-129 mg/dl  Borderline High:  130-159 mg/dL  High:             160-189 mg/dL  Very high:       >189 mg/dl       Non HDL Cholesterol 08/05/2020 148* <130 mg/dL Final    Comment: Above Desirable:  130-159 mg/dl  Borderline high:  160-189 mg/dl  High:             190-219 mg/dl  Very high:       >219 mg/dl       Sodium 08/05/2020 138  133 - 144 mmol/L Final     Potassium 08/05/2020 4.1  3.4 - 5.3 mmol/L Final     Chloride 08/05/2020 105  94 - 109 mmol/L Final     Carbon Dioxide 08/05/2020 26  20 - 32 mmol/L Final     Anion Gap 08/05/2020 7  3 - 14 mmol/L Final     Glucose 08/05/2020 99  70 - 99 mg/dL Final    Fasting specimen     Urea Nitrogen 08/05/2020 7  7 - 30 mg/dL Final     Creatinine 08/05/2020 0.78  0.66 - 1.25 mg/dL Final     GFR Estimate 08/05/2020 >90  >60 mL/min/[1.73_m2] Final    Comment: Non  GFR Calc  Starting 12/18/2018, serum creatinine based estimated GFR (eGFR) will be   calculated using the Chronic Kidney Disease Epidemiology Collaboration   (CKD-EPI) equation.       GFR Estimate If Black 08/05/2020 >90  >60 mL/min/[1.73_m2] Final    Comment:  GFR Calc  Starting 12/18/2018, serum creatinine based estimated GFR (eGFR) will be   calculated using the Chronic Kidney Disease Epidemiology Collaboration   (CKD-EPI) equation.       Calcium 08/05/2020 8.9  8.5 - 10.1 mg/dL Final     Bilirubin Total 08/05/2020 0.7  0.2 - 1.3 mg/dL Final     Albumin 08/05/2020 3.7  3.4 - 5.0 g/dL Final     Protein Total 08/05/2020 7.4  6.8 - 8.8 g/dL Final     Alkaline Phosphatase 08/05/2020 58  40 - 150 U/L Final     ALT 08/05/2020 40  0 - 70 U/L Final     AST 08/05/2020 18  0 - 45 U/L Final   Orders Only on 07/02/2020   Component Date Value Ref Range Status     Helicobacter pylori Antigen Stool 07/02/2020 Negative  NEG^Negative Final    Comment: Negative for Helicobacter pylori antigen by  enzyme immunoassay. A negative   result indicates the absence of H. pylori antigen or that the level of antigen   is below the level of detection.     Office Visit on 07/01/2020   Component Date Value Ref Range Status     Cholesterol 07/01/2020 92  <200 mg/dL Final     Triglycerides 07/01/2020 144  <150 mg/dL Final    Non Fasting     HDL Cholesterol 07/01/2020 27* >39 mg/dL Final     LDL Cholesterol Calculated 07/01/2020 36  <100 mg/dL Final    Desirable:       <100 mg/dl     Non HDL Cholesterol 07/01/2020 65  <130 mg/dL Final     CRP Inflammation 07/01/2020 <2.9  0.0 - 8.0 mg/L Final     Sodium 07/01/2020 138  133 - 144 mmol/L Final     Potassium 07/01/2020 4.4  3.4 - 5.3 mmol/L Final     Chloride 07/01/2020 108  94 - 109 mmol/L Final     Carbon Dioxide 07/01/2020 27  20 - 32 mmol/L Final     Anion Gap 07/01/2020 3  3 - 14 mmol/L Final     Glucose 07/01/2020 92  70 - 99 mg/dL Final    Non Fasting     Urea Nitrogen 07/01/2020 12  7 - 30 mg/dL Final     Creatinine 07/01/2020 0.77  0.66 - 1.25 mg/dL Final     GFR Estimate 07/01/2020 >90  >60 mL/min/[1.73_m2] Final    Comment: Non  GFR Calc  Starting 12/18/2018, serum creatinine based estimated GFR (eGFR) will be   calculated using the Chronic Kidney Disease Epidemiology Collaboration   (CKD-EPI) equation.       GFR Estimate If Black 07/01/2020 >90  >60 mL/min/[1.73_m2] Final    Comment:  GFR Calc  Starting 12/18/2018, serum creatinine based estimated GFR (eGFR) will be   calculated using the Chronic Kidney Disease Epidemiology Collaboration   (CKD-EPI) equation.       Calcium 07/01/2020 8.7  8.5 - 10.1 mg/dL Final     Bilirubin Total 07/01/2020 0.6  0.2 - 1.3 mg/dL Final     Albumin 07/01/2020 3.8  3.4 - 5.0 g/dL Final     Protein Total 07/01/2020 7.2  6.8 - 8.8 g/dL Final     Alkaline Phosphatase 07/01/2020 79  40 - 150 U/L Final     ALT 07/01/2020 176* 0 - 70 U/L Final     AST 07/01/2020 114* 0 - 45 U/L Final     Lipase 07/01/2020 115   73 - 393 U/L Final     WBC 07/01/2020 5.0  4.0 - 11.0 10e9/L Final     RBC Count 07/01/2020 4.75  4.4 - 5.9 10e12/L Final     Hemoglobin 07/01/2020 14.6  13.3 - 17.7 g/dL Final     Hematocrit 07/01/2020 42.9  40.0 - 53.0 % Final     MCV 07/01/2020 90  78 - 100 fl Final     MCH 07/01/2020 30.7  26.5 - 33.0 pg Final     MCHC 07/01/2020 34.0  31.5 - 36.5 g/dL Final     RDW 07/01/2020 12.6  10.0 - 15.0 % Final     Platelet Count 07/01/2020 242  150 - 450 10e9/L Final     % Neutrophils 07/01/2020 58.7  % Final     % Lymphocytes 07/01/2020 26.1  % Final     % Monocytes 07/01/2020 9.6  % Final     % Eosinophils 07/01/2020 5.0  % Final     % Basophils 07/01/2020 0.6  % Final     Absolute Neutrophil 07/01/2020 2.9  1.6 - 8.3 10e9/L Final     Absolute Lymphocytes 07/01/2020 1.3  0.8 - 5.3 10e9/L Final     Absolute Monocytes 07/01/2020 0.5  0.0 - 1.3 10e9/L Final     Absolute Eosinophils 07/01/2020 0.3  0.0 - 0.7 10e9/L Final     Absolute Basophils 07/01/2020 0.0  0.0 - 0.2 10e9/L Final     Diff Method 07/01/2020 Automated Method   Final     PSA 07/01/2020 0.82  0 - 4 ug/L Final    Assay Method:  Chemiluminescence using Siemens Vista analyzer     Most recent EKG from 8/12/20 reviewed. QTc interval 427.      Family History:   Patient reported family history includes:   Family History   Problem Relation Age of Onset     Family History Negative Father      Deep Vein Thrombosis Father      Atrial fibrillation Father      Family History Negative Mother      Family History Negative Brother      Deep Vein Thrombosis Sister      Ovarian Cancer Maternal Grandmother      Coronary Artery Disease Maternal Grandfather      Mental Illness History: Denies  Substance Abuse History: Denies  Suicide History: Unknown  Medications: Unknown     Social History Per [facility], Dr. Amauri Herrera, during today's team-based visit:   Patient reported they grew up in [place] until they moved to [place] when he was 14yo.  They were raised by biological parents.  "Patient reported that he/him/his childhood was \"pretty typical kid growing up in the 70's. It was fun, it was great.\" Maintains a close relationship with a group of childhood friends. Patient denied experiencing childhood abuse/neglect. Patient described their current relationships with family of origin as \"it's very good.\"       The patient has been  once times and has no children. he/him/his described the relationship with he/him/his spouse as, \"it's good.\" Patient reported having some good friends.     Patient reported he/him/his highest education level was high school graduate and some college. The patient did serve in the . 4 years in the [employer]. Patient is currently retired. 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    Firearms/Weapons Access: No: Patient denies   Service: Yes Branch: [employer], Served: 4 years and Discharge status: Honarable    Legal History:  Yes: DUI in the 1980s    Significant Losses / Trauma / Abuse / Neglect Issues:  There are no indications or report of: significant losses, trauma, abuse or neglect.   Issues of possible neglect are not present.     Comprehensive Examination (limited due to virtual visit format, phone/video):  Vital Signs:  Vitals: There were no vitals taken for this visit.  General/Constitutional:  Appearance: awake, alert, adequately groomed, appeared stated age and no apparent distress  Attitude:  cooperative, pleasant  Eye Contact:  good  Musculoskeletal:  Muscle Strength and Tone: no gross abnormalities by observation  Psychomotor Behavior:  no evidence of tardive dyskinesia, dystonia, or tics  Gait and Station: normal, no gross abnormalities noted by observation  Psychiatric:  Speech:  clear, coherent, regular rate, rhythm, and volume  Associations:  no loose associations  Thought Process:  logical, linear and goal oriented  Thought Content:  no evidence of suicidal ideation or homicidal ideation, no evidence of psychotic thought, no " auditory hallucinations present and no visual hallucinations present  Mood:  anxious  Affect:  appropriate and in normal range and mood congruent  Insight:  good  Judgment:  intact, adequate for safety  Impulse Control:  intact  Neurological:  Oriented to:  person, place, time, and situation  Attention Span and Concentration:  normal  Language: intact  Recent and Remote Memory:  Intact to interview. Not formally assessed. No amnesia.  Fund of Knowledge: appropriate    Strengths and Opportunities:   Amado Veliz identified the following strengths or resources that may help he succeed in counseling: commitment to health and well being, friends / good social support, family support, insight, intelligence and motivation. Things that may interfere with the patient's success include:  none noted at this time.    There are no language or communication issues or need for modification in treatment.   There are no ethnic, cultural or Pentecostal factors that may be relevant for therapy.  Client identified their preferred language to be English.  Client does not need the assistance of an  or other support involved in therapy.    Suicide Risk Assessment:  Today Amado Veliz reports no suicidal ideation. Based on all available evidence including the factors cited above, Amado Veliz does not appear to be at imminent risk for self-harm, does not meet criteria for a 72-hr hold, and therefore remains appropriate for ongoing outpatient level of care.  A thorough assessment of risk factors related to suicide and self-harm have been reviewed and are noted above. The patient convincingly denies acute suicidality on several occasions. Local community safety resources reviewed and printed for patient to use if needed. There was no deceit detected, and the patient presented in a manner that was believable.     DSM5  Diagnosis:  300.02 (F41.1) Generalized Anxiety Disorder   R/O Panic Disorder    Medical  Comorbidities Include:   Patient Active Problem List    Diagnosis Date Noted     Anxiety 09/08/2020     Priority: Medium     Acute reaction to stress 09/03/2020     Priority: Medium     Atypical chest pain 08/05/2020     Priority: Medium     Unstable angina (H) 04/22/2020     Priority: Medium     Pharyngoesophageal dysphagia 04/22/2020     Priority: Medium     HYPERLIPIDEMIA LDL GOAL <160 10/31/2010     Priority: Medium     Mixed hyperlipidemia 06/10/2005     Priority: Medium     Hemorrhage of rectum and anus 10/09/2003     Priority: Medium       Impression:  Amado Veliz is a 60-year-old male with past psychiatric history including anxiety and comorbid cardiac issues who presents today for psychiatric evaluation.  Ever since his cardiac stent placement and ongoing atypical chest pains, he has had worsening anxiety and some panic symptoms.  He has had a few emergency room visits that have seemed related more to anxiety than underlying cardiac issues since cardiac work-ups have been unremarkable.  He is however currently being treated for pericarditis.  He has been maintained on Celexa 20 mg daily for several months with little/partial efficacy.  Some of his anxiety has improved, but he is not sure if this is more related to the improving social/political culture or the medication.  He did not tolerate side effects of buspirone, which was trialed prior to Celexa.  Due to his age, would prefer not to go above 20 mg on Celexa, and so it seems reasonable to trial a new agent.  We will transition him from Celexa to sertraline.  He continues to use clonazepam appropriately with no negative side effects at this time.  I did encourage him to use it more frequently, up to 2-3 times a week if absolutely necessary, at least on a temporary basis during this transition which might worsen some anxiety before it again gets improved.  No substance use concerns.  No safety concerns.  He was also encouraged to participate in  individual psychotherapy for his anxiety symptoms, as CBT can be quite effective.  Also encouraged to continue working with cardiology and primary care provider to continue monitoring of cardiac complaints.    Medication side effects and alternatives reviewed. Health promotion activities recommended and reviewed today. All questions addressed. Education and counseling completed regarding risks and benefits of medications and psychotherapy options. Recommend therapy for additional support.     Treatment Plan:    Discontinue Celexa/citalopram.  Take 10 mg of citalopram daily WITH sertraline 50 mg daily for 2 weeks.  Then stop citalopram after those 2 weeks and increase sertraline to 100 mg daily for anxiety.    Start sertraline for anxiety.  Start 50 mg daily for 2 weeks (take with your 10 mg of citalopram for those 2 weeks), then increase sertraline to 100 mg daily as tolerated.    Continue with clonazepam 0.25-0.5 mg up to 2 times daily as needed for severe anxiety/panic.  Try not to use more than 2-3 times per week.    Recommend individual psychotherapy for additional support and development of nonpharmacologic coping skills and strategies.    Continue working closely with your primary care provider and cardiologist to monitor any ongoing chest pains and follow their medical recommendations regarding your chest pains.    Continue all other medications as reviewed per electronic medical record today.     Safety plan reviewed. To the Emergency Department as needed or call after hours crisis line at [phone] or [phone]. [place] Crisis Text Line: Text [place] to 668506  or  Suicide LifeLine Chat: suicidepreventionlifeline.org/chat    Schedule an appointment with me in 6 weeks or sooner as needed.  Call [facility] at [phone] to schedule.    Follow up with primary care provider as planned or sooner if needed for acute medical concerns.    Call the psychiatric nurse line with medication  questions or concerns at [phone].    MyChart may be used to communicate with your provider, but this is not intended to be used for emergencies.    Patient Education:  Risks of benzodiazepine (Ativan, Xanax, Klonopin, Valium, etc) use including, but not limited to, sedation, tolerance, risk for addiction/dependence. Do not drink alcohol while taking benzodiazepines due to risk of trouble breathing and potential death. Do not drive or operate heavy machinery until it is known how the drug affects you. Discuss with physician or pharmacist before ever taking a benzodiazepine with a narcotic/opioid pain medication.     Community Resources:    National Suicide Prevention Lifeline: 729.840.5495 (TTY: 397.974.5818). Call anytime for help.  (www.suicidepreventionlifeline.org)  National [place] on Mental Illness (www.ever.org): 891.657.9782 or 653-743-8449.   Mental Health Association (www.mentalhealth.org): 532.250.5861 or 695-047-5231.  [place] Crisis Text Line: Text [place] to 914776  Suicide LifeLine Chat: suicidepreEnswersline.org/chat    Administrative Billing:   Phone Call/Video Duration: 29 Minutes  Start: 9:27a  Stop: 9:49a    Complexity of Care: Patient with multiple psychiatric diagnoses and multiple medication changes adding to complexity of care.    Patient Status:  Patient will continue to be seen for ongoing consultation and stabilization.    Signed:   Ivon Cornell DO  [facility]

## 2021-01-28 NOTE — Clinical Note
Just FYI. No action needed.  We will transition from Celexa to sertraline.  I do not want to go above the 20 mg he was on for Celexa due to his age, so we will trial sertraline and see if that can be optimized with more efficacy.  Let me know if you have any questions or concerns.    -Ivon  [facility]

## 2021-01-28 NOTE — PROGRESS NOTES
"PATIENT'S NAME: Amado Veliz  PREFERRED NAME: Bassam  PREFERRED PRONOUNS: he/him/his  MRN:   [id number]  :   1960   ACCT. NUMBER: [id number]  DATE OF SERVICE: 21  START TIME: 08:45am  END TIME: 09:15am    BRIEF ADULT DIAGNOSTIC ASSESSMENT    Telemedicine Visit: The patient's condition can be safely assessed and treated via synchronous audio and visual telemedicine encounter.      Reason for Telemedicine Visit: Services only offered telehealth    Originating Site (Patient Location): Patient's home    Distant Site (Provider Location): Provider Remote Setting    Consent:  The patient/guardian has verbally consented to: the potential risks and benefits of telemedicine (video visit) versus in person care; bill my insurance or make self-payment for services provided; and responsibility for payment of non-covered services.     Mode of Communication:  Video Conference via OnSwipe    As the provider I attest to compliance with applicable laws and regulations related to telemedicine.    Identifying Information:  Patient is a 60 year old, .  The pronoun use throughout this assessment reflects the patient's chosen pronoun.  Patient was referred for an assessment by [facility]. [facility].  Patient attended the session alone.     Chief Complaint:   The reason for seeking services at this time is: \"My doctor recommended I come see you guys for anxiety.\" Had a stent put in last April and had lingering chest pain, COVID-19, \"and everything has been a load for me to not think about all the time.\" He feels chest pain and worries he will die with the next wave of chest pain. He noted that he has always been \"an excessive worrier. I think of obscure, low-probability things that could happen.\" [place] and [place] \"almost put me down.\" He found it hard to think, focus, and complete tasks. He was started on medications at that time. 2020 he believes he " had a panic attack and went to the ER. He does not feel the citalopram has been helpful; clonazepam seems more helpful but knows he should not be take those too often. Patient has attempted to resolve these concerns in the past through medication and reading a self-help book.    Does the client have any condition that is currently presenting as a potential to harm themselves or others (severe withdrawal, serious medical condition, severe emotional/behavioral problem)? No.  Proceed with assessment.    Review of Symptoms per patient report:  Depression: No symptoms  Marleni:  No Symptoms  Psychosis: No Symptoms  Anxiety: Excessive worry, Nervousness, Physical complaints, such as headaches, stomachaches, muscle tension, Sleep disturbance, Ruminations and Poor concentration  Panic:  No symptoms  Post Traumatic Stress Disorder:  No Symptoms   Eating Disorder: No Symptoms  ADD / ADHD:  No symptoms  Conduct Disorder: No symptoms  Autism Spectrum Disorder: No symptoms  Obsessive Compulsive Disorder: Cleaning, Symetry and Not current; In the past; mowing lawn had to be in straight lines, fastidious, everything in its place    Sleep: better now; awful last summer, 3-4 hours per night. Sleeping better now  Caffeine: quit when he got a stent. No soda  Tobacco: Quit 1993    Current alcohol use: none  Current drug use: none    Rating Scales:  PHQ-9:   [facility] Follow-up to PHQ 9/8/2020 10/23/2020   PHQ-9 9. Suicide Ideation past 2 weeks Not at all Not at all      GAD7:    TAJ-7 SCORE 9/8/2020 10/23/2020   Total Score 13 10     CGI:  First:  Considering your total clinical experience with this particular patient population, how severe are the patient's symptoms at this time?: 4 (1/28/2021  9:20 AM)    Most recent:  No data recorded    WHODAS: No flowsheet data found.     CAGE:    CAGE-AID Total Score 1/28/2021   Total Score 0       CAGE-AID score  > 1 is a positive screen, suggesting further discussion is needed to determine if  evaluation for alcohol or substance abuse is appropriate.  A score > 2 is considered clinically significant, suggesting further evaluation of alcohol or substance-related problems is indicated.      Personal Medical History:  Past Medical History:   Diagnosis Date     Anxiety      CAD (coronary artery disease)     Status post stenting of proximal right coronary artery complex ruptured plaque on 23 April 2020.     Hyperlipidemia LDL goal <70      Intestinal infection due to Clostridium difficile      Pharyngoesophageal dysphagia        Patient has received mental health services in the past: primary care provider at [facility]..  Psychiatric Hospitalizations: None.  Patient denies a history of civil commitment. Currently, patient is not receiving other mental health services.  These include none.     Patient does not report a history of head injury / trauma / cognitive impairment / seizures.    Current Medications:  Current Outpatient Medications   Medication Sig Dispense Refill     citalopram (CELEXA) 20 MG tablet Take 1 tablet (20 mg) by mouth daily 90 tablet 3     clonazePAM (KLONOPIN) 0.5 MG tablet Take 1 tablet (0.5 mg) by mouth 2 times daily as needed for anxiety (Use infrequently for more acute anxiety) 20 tablet 0     traZODone (DESYREL) 50 MG tablet Take 0.5-2 tablets ( mg) by mouth nightly as needed for sleep 60 tablet 3     aspirin (ASA) 81 MG EC tablet Take 1 tablet (81 mg) by mouth daily 90 tablet 3     atorvastatin (LIPITOR) 20 MG tablet Take 1 tablet (20 mg) by mouth every evening 90 tablet 3     clopidogrel (PLAVIX) 75 MG tablet Take 1 tablet (75 mg) by mouth daily 90 tablet 3     colchicine (MITIGARE) 0.6 MG capsule Take 1 capsule (0.6 mg) by mouth daily for another 2 months, then stop 60 capsule 0     metoprolol succinate ER (TOPROL-XL) 25 MG 24 hr tablet Take 0.5 tablets (12.5 mg) by mouth daily 90 tablet 1     nitroGLYcerin (NITROSTAT) 0.4 MG sublingual tablet For chest  "pain place 1 tablet under the tongue every 5 minutes for 3 doses. If symptoms persist 5 minutes after 1st dose call 911. 30 tablet 0        Allergies:  Allergies   Allergen Reactions     Niacin Unknown     Patient gets flushed and red in the face.       Family Psychiatric History:  Patient did not report a family history of mental health concerns.     Family History     Problem (# of Occurrences) Relation (Name,Age of Onset)    Atrial fibrillation (1) Father    Coronary Artery Disease (1) Maternal Grandfather    Deep Vein Thrombosis (2) Father, Sister    Family History Negative (3) Father, Mother, Brother    Ovarian Cancer (1) Maternal Grandmother          Social/Family History:  Patient reported they grew up in [place] until they moved to [place] when he was 14yo.  They were raised by biological parents. Patient reported that he/him/his childhood was \"pretty typical kid growing up in the 70's. It was fun, it was great.\" Maintains a close relationship with a group of childhood friends. Patient denied experiencing childhood abuse/neglect. Patient described their current relationships with family of origin as \"it's very good.\"      The patient has been  once times and has no children. he/him/his described the relationship with he/him/his spouse as, \"it's good.\" Patient reported having some good friends.    Cultural influences and impact on patient's life structure, values, norms, and healthcare: Spiritual Beliefs: Christianity. Patient identified their preferred language to be English. Patient reported they does not need the assistance of an  or other support involved in treatment.       Educational/Occupational History:  Patient reported he/him/his highest education level was high school graduate and some college. The patient did serve in the . 4 years in the [employer]. Patient is currently retired. 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      Social History     Socioeconomic History     " Marital status:      Spouse name: Not on file     Number of children: 0     Years of education: Not on file     Highest education level: High school graduate   Occupational History     Occupation:    Social Needs     Financial resource strain: Not hard at all     Food insecurity     Worry: Never true     Inability: Never true     Transportation needs     Medical: No     Non-medical: No   Tobacco Use     Smoking status: Former Smoker     Packs/day: 1.00     Years: 15.00     Pack years: 15.00     Types: Cigarettes     Start date:      Quit date: 3/17/1993     Years since quittin.8     Smokeless tobacco: Never Used   Substance and Sexual Activity     Alcohol use: No     Drug use: No     Sexual activity: Yes     Partners: Female   Lifestyle     Physical activity     Days per week: 7 days     Minutes per session: 60 min     Stress: To some extent   Relationships     Social connections     Talks on phone: Not on file     Gets together: Not on file     Attends Voodoo service: Not on file     Active member of club or organization: Not on file     Attends meetings of clubs or organizations: Not on file     Relationship status: Not on file     Intimate partner violence     Fear of current or ex partner: No     Emotionally abused: No     Physically abused: No     Forced sexual activity: No   Other Topics Concern      Service Not Asked     Blood Transfusions Not Asked     Caffeine Concern Not Asked     Occupational Exposure Not Asked     Hobby Hazards Not Asked     Sleep Concern Not Asked     Stress Concern Not Asked     Weight Concern Not Asked     Special Diet Not Asked     Back Care Not Asked     Exercise Not Asked     Bike Helmet Not Asked     Seat Belt Yes     Self-Exams Not Asked     Parent/sibling w/ CABG, MI or angioplasty before 65F 55M? Not Asked   Social History Narrative    SD CHECKED REGULARLY.    GUNS UNLOADED IN CLOSET AT HOME.       Patient reported that they  have not been involved with the legal system.  Patient denies being on probation / parole / under the jurisdiction of the court.    Current Mental Status Exam:   Appearance:   Appropriate    Eye Contact:   Good   Psychomotor:   Normal   Attitude / Demeanor:  Cooperative   Speech      Rate / Production:  Normal/ Responsive      Volume:   Normal  volume      Language:   intact  Mood:    Anxious  Normal  Affect:    Appropriate    Thought Content:  Clear   Thought Process:  Logical       Associations:  No loosening of associations  Insight:    Good   Judgment:   Intact   Orientation:   All  Attention/concentration: Good      Safety Assessment:   Current Safety Concerns:  [place] Suicide Severity Rating Scale (Lifetime/Recent)  [place] Suicide Severity Rating (Lifetime/Recent) 1/28/2021   1. Wish to be Dead (Lifetime) No   1. Wish to be Dead (Recent) No   2. Non-Specific Active Suicidal Thoughts (Lifetime) No   2. Non-Specific Active Suicidal Thoughts (Recent) No   3. Active Suicidal Ideation with any Methods (Not Plan) Without Intent to Act (Lifetime) No   3. Active Suicidal Ideation with any Methods (Not Plan) Without Intent to Act (Recent) No   4. Active Suicidal Ideation with Some Intent to Act, Without Specific Plan (Lifetime) No   4. Active Suicidal Ideation with Some Intent to Act, Without Specific Plan (Recent) No   5. Active Suicidal Ideation with Specific Plan and Intent (Lifetime) No   5. Active Suicidal Ideation with Specific Plan and Intent (Recent) No     Patient denies current homicidal ideation and behaviors.  Patient denies current self-injurious ideation and behaviors.    Patient denied risk behaviors associated with substance use.  Patient denies any high risk behaviors associated with mental health symptoms.  Patient reports the following current concerns for their personal safety: None.  Patient reports there are no firearms in the house.  .     History of Safety Concerns:  Patient denied a history  of homicidal ideation.     Patient denied a history of personal safety concerns.    Patient denied a history of assaultive behaviors.    Patient denied a history of sexual assault behaviors.     Patient denied a history of risk behaviors associated with substance use.  Patient denies any history of high risk behaviors associated with mental health symptoms.  Patient reports the following protective factors: spirituality, positive relationships positive social network and positive family connections, forward/future oriented thinking, dedication to family/friends, safe and stable environment, effectively controls impulses, regular physical activity, abstinence from substances, adherence with prescribed medication, living with other people, daily obligations, structured day, effective problem-solving skills, committment to well-being, sense of meaning, positive social skills and financial stability    Risk Plan:  See Preliminary Treatment Plan for Safety and Risk Management Plan    Diagnosis:  Diagnostic Criteria:   A. Excessive anxiety and worry about a number of events or activities (such as work or school performance).   B. The person finds it difficult to control the worry.  C. Select 3 or more symptoms (required for diagnosis). Only one item is required in children.   - Restlessness or feeling keyed up or on edge.    - Being easily fatigued.    - Difficulty concentrating or mind going blank.    - Irritability.    - Muscle tension.    - Sleep disturbance (difficulty falling or staying asleep, or restless unsatisfying sleep).   D. The focus of the anxiety and worry is not confined to features of an Axis I disorder.  E. The anxiety, worry, or physical symptoms cause clinically significant distress or impairment in social, occupational, or other important areas of functioning.   F. The disturbance is not due to the direct physiological effects of a substance (e.g., a drug of abuse, a medication) or a general medical  condition (e.g., hyperthyroidism) and does not occur exclusively during a Mood Disorder, a Psychotic Disorder, or a Pervasive Developmental Disorder.      Patient's Strengths and Limitations:  Patient identified the following strengths or resources that will help them succeed in treatment: commitment to health and well being, exercise routine, laura / spirituality, friends / good social support, family support, insight, intelligence, motivation, strong social skills and work ethic. Things that may interfere with the patient's success in treatment include: physical health concerns.     Recommendations:     1. Plan for Safety and Risk Management:Recommended that patient call 911 or go to the local ED should there be a change in any of these risk factors..  Report to child / adult protection services was N/A.      2. Resources/Service Plan:       services are not indicated.     Modifications to assist communication are not indicated.     Additional disability accommodations are not indicated.      3. Collaboration:  Collaboration / coordination of treatment will be initiated with the following support professionals: psychiatry.      4.  Referrals:   The following referral(s) will be initiated: Outpatient [facility].       Staff Name/Credentials:  Amauri Herrera PsyD, MALATHI  January 28, 2021

## 2021-01-28 NOTE — PATIENT INSTRUCTIONS
Treatment Plan:    Discontinue Celexa/citalopram.  Take 10 mg of citalopram daily WITH sertraline 50 mg daily for 2 weeks.  Then stop citalopram after those 2 weeks and increase sertraline to 100 mg daily for anxiety.    Start sertraline for anxiety.  Start 50 mg daily for 2 weeks (take with your 10 mg of citalopram for those 2 weeks), then increase sertraline to 100 mg daily as tolerated.    Continue with clonazepam 0.25-0.5 mg up to 2 times daily as needed for severe anxiety/panic.  Try not to use more than 2-3 times per week.    Recommend individual psychotherapy for additional support and development of nonpharmacologic coping skills and strategies.    Continue working closely with your primary care provider and cardiologist to monitor any ongoing chest pains and follow their medical recommendations regarding your chest pains.    Continue all other medications as reviewed per electronic medical record today.     Safety plan reviewed. To the Emergency Department as needed or call after hours crisis line at [phone] or [phone]. [place] Crisis Text Line: Text [place] to 849922  or  Suicide LifeLine Chat: suicidepreventionlifeline.org/chat    Schedule an appointment with me in 6 weeks or sooner as needed.  Call [facility] at [phone] to schedule.    Follow up with primary care provider as planned or sooner if needed for acute medical concerns.    Call the psychiatric nurse line with medication questions or concerns at [phone].    [email or web address] may be used to communicate with your provider, but this is not intended to be used for emergencies.    Patient Education:  Risks of benzodiazepine (Ativan, Xanax, Klonopin, Valium, etc) use including, but not limited to, sedation, tolerance, risk for addiction/dependence. Do not drink alcohol while taking benzodiazepines due to risk of trouble breathing and potential death. Do not drive or operate heavy machinery until it is known how the drug affects  you. Discuss with physician or pharmacist before ever taking a benzodiazepine with a narcotic/opioid pain medication.     Community Resources:    National Suicide Prevention Lifeline: 969.371.2956 (TTY: 112.433.1053). Call anytime for help.  (www.suicidepreventionlifeline.org)  National [place] on Mental Illness (www.ever.org): 659.158.2642 or 211-162-4599.   Mental Health Association (www.mentalhealth.org): 679.401.2117 or 617-713-0303.  [place] Crisis Text Line: Text [place] to 830504  Suicide LifeLine Chat: suicidepreventionlifeline.org/chat

## 2021-02-05 ENCOUNTER — MYC MEDICAL ADVICE (OUTPATIENT)
Dept: PSYCHOLOGY | Facility: CLINIC | Age: 61
End: 2021-02-05

## 2021-03-02 ENCOUNTER — CARE COORDINATION (OUTPATIENT)
Dept: CARDIOLOGY | Facility: CLINIC | Age: 61
End: 2021-03-02

## 2021-03-02 DIAGNOSIS — R07.89 ATYPICAL CHEST PAIN: ICD-10-CM

## 2021-03-02 RX ORDER — COLCHICINE 0.6 MG/1
0.6 CAPSULE ORAL DAILY
Qty: 90 CAPSULE | Refills: 0 | Status: SHIPPED | OUTPATIENT
Start: 2021-03-02 | End: 2021-09-02

## 2021-03-02 NOTE — PROGRESS NOTES
Discussed pt's symptoms with Dr. Wayne, recommended pt restart the colchicine indefinitely. Called pt with recommendations & prescription escripted to [facility] as requested. Apt w/ dr. Wayne rescheduled to 3/11/21. Elia VANG

## 2021-03-02 NOTE — PROGRESS NOTES
Received call from pt stating that he is having chest pain again after stopping the colchicine about 10 days ago. He states the pain started shortly after stopping the colchicine, it comes & goes, not really associated with exertion or rest. He states he could feel the pain a little this morning when he woke up, but it got a lot more intense as he sat in his chair eating breakfast & reading the paper. He describes it as a sharp pain, states it feels more like a pressure or tightness. He states it does not radiate, he denies any associated shortness or breath or nausea. He states he still has the pain 5 hours later, although less intense. He states it usually goes away after a few hours. Pt advised to follow up with Dr. Wayne & is scheduled 3/04/21. Pt advised to go to the ED if pain continues or worsen. Elai VANG

## 2021-03-08 ENCOUNTER — NURSE TRIAGE (OUTPATIENT)
Dept: NURSING | Facility: CLINIC | Age: 61
End: 2021-03-08

## 2021-03-08 ENCOUNTER — TELEPHONE (OUTPATIENT)
Dept: INTERNAL MEDICINE | Facility: CLINIC | Age: 61
End: 2021-03-08

## 2021-03-08 NOTE — TELEPHONE ENCOUNTER
Patient received the first covid vaccine (Moderna) yesterday at [facility]. The person administering the vaccine injected it very high on his arm and he doesn't think it hit the muscle. Is the vaccine still going to be effective? Call him to advise at [phone] (home)

## 2021-03-08 NOTE — TELEPHONE ENCOUNTER
Patient calling. States he received a covid vaccine yesterday.    He is questioning the location of the vaccine. States he feels like it was really high on his arm - close to his shoulder.     Explained that there is muscle there and the vaccine is given IM.    Patient states he is doing well post-vaccine. He had some soreness in his arm but that is diminishing.    Protocol and care advice reviewed  Caller states understanding of the recommended disposition    Advised to call back if further questions or concerns      Reason for Disposition    COVID-19 vaccine, Frequently Asked Questions (FAQs)    Additional Information    Negative: [1] Difficulty breathing or swallowing AND [2] starts within 2 hours after injection    Negative: Sounds like a life-threatening emergency to the triager    Negative: [1] COVID-19 exposure AND [2] no symptoms    Negative: [1] Typical COVID-19 symptoms AND [2] symptoms that are NOT expected from vaccine (e.g., cough, difficulty breathing, loss of taste or smell, runny nose, sore throat)    Negative: [1] Typical COVID-19 symptoms AND [2] started > 3 days after getting vaccine    Negative: Fever > 104 F (40 C)    Negative: Sounds like a severe, unusual reaction to the triager    Negative: [1] Redness or red streak around the injection site AND [2] started > 48 hours after getting vaccine AND [3] fever    Negative: [1] Fever > 101 F (38.3 C) AND [2] age > 60 AND [3] started > 48 hours after getting vaccine    Negative: [1] Fever > 100.0 F (37.8 C) AND [2] bedridden (e.g., nursing home patient, CVA, chronic illness, recovering from surgery) AND [3] started > 48 hours after getting vaccine    Negative: [1] Fever > 100.0 F (37.8 C) AND [2] diabetes mellitus or weak immune system (e.g., HIV positive, cancer chemo, splenectomy, organ transplant, chronic steroids) AND [3] started > 48 hours after getting vaccine    Negative: [1] Redness or red streak around the injection site AND [2] started > 48  hours after getting vaccine AND [3] no fever  (Exception: red area < 1 inch or 2.5 cm wide)    Negative: [1] Pain, tenderness, or swelling at the injection site AND [2] over 3 days (72 hours) since vaccine AND [3] getting worse    Negative: Fever > 100.0 F (37.8 C) present > 3 days (72 hours)    Negative: [1] Fever > 100.0 F (37.8 C) AND [2] healthcare worker    Negative: [1] Pain, tenderness, or swelling at the injection site AND [2] lasts > 7 days    Negative: [1] Requesting COVID-19 vaccine AND [2] healthcare worker (e.g., EMS first responders, doctors, nurses)    Negative: [1] Requesting COVID-19 vaccine AND [2] resident of a long-term care facility (e.g., nursing home)    Negative: [1] Requesting COVID-19 vaccine AND [2] vaccine available in the community for this patient group    Negative: COVID-19 vaccine, systemic reactions (e.g., fatigue, fever, muscle aches), questions about    Negative: COVID-19 vaccine, injection site reaction (e.g., pain, redness, swelling), question about    Protocols used: CORONAVIRUS (COVID-19) VACCINE QUESTIONS AND [id number]-BV 1.3

## 2021-03-11 ENCOUNTER — OFFICE VISIT (OUTPATIENT)
Dept: CARDIOLOGY | Facility: CLINIC | Age: 61
End: 2021-03-11
Payer: COMMERCIAL

## 2021-03-11 VITALS — HEART RATE: 62 BPM | DIASTOLIC BLOOD PRESSURE: 76 MMHG | SYSTOLIC BLOOD PRESSURE: 126 MMHG

## 2021-03-11 DIAGNOSIS — R07.9 CHEST PAIN, UNSPECIFIED TYPE: Primary | ICD-10-CM

## 2021-03-11 PROCEDURE — 99213 OFFICE O/P EST LOW 20 MIN: CPT | Performed by: INTERNAL MEDICINE

## 2021-03-11 RX ORDER — MULTIPLE VITAMINS W/ MINERALS TAB 9MG-400MCG
1 TAB ORAL DAILY
COMMUNITY

## 2021-03-11 NOTE — PROGRESS NOTES
HPI and Plan:   Today I had the pleasure of seeing Amado Veliz at [facility]. He is a pleasant 60 year old patient with a past medical history of coronary artery disease status post stenting of proximal RCA in 04/2020, hyperlipidemia, anxiety and remote tobacco abuse who presents to the clinic for a follow-up visit.    Since undergoing PCI of the proximal RCA with a 4.0 x 16 mm Synergy drug-eluting stent the patient has continued to report occasional, sharp, substernal chest pain only when going up the hill.  We have done extensive work-up which has included a nuclear stress test and most recently a cardiac MRI with stress all of which have failed to show any reversible ischemia.  The MRI was also negative for any scar.  In terms of treatment, we have tried multiple things.  He did not derive any benefit from Imdur and got headaches.  He also tried Norvasc which again was not beneficial.  Ranexa was not covered.  The only thing that has helped his colchicine, even though, ESR and CRP are all negative.  He has been on twice a day of colchicine for prolonged period of time.  We recently stopped colchicine which led to recurrence of symptoms and led to reinitiation of colchicine.  He noticed immediate improvement after restarting it.    Today, he tells me that he is overall feeling well and does not have any complaints besides what mentioned above.  He denies shortness of breath, palpitations, PND, orthopnea, presyncope, syncope lower extremity edema.  He wonders about stopping Plavix and metoprolol.    Assessment and plan  1.  Coronary disease s/p PCI of proximal RCA-followed by negative nuclear stress test and cardiac MRI  2.  Ongoing atypical chest pain-relieved with colchicine  3.  Hyperlipidemia-well controlled  4.  Anxiety-controlled    Given that multiple types of stress test have been negative my suspicion for ischemia is extremely low.  Small vessel disease although a possibility  is also unlikely given that his symptoms did not improve with Imdur or Norvasc.  Moreover, the fact that colchicine makes his pain better is another reason to believe that this pain is not ischemia related.  I discussed all this with him.  I told him to cut back on colchicine and take it every other day to see if he is able to tolerate that.  If he is able to tolerate it without worsening of his symptoms we will continue to cut back on it and ultimately stop.  I will have him come back and see him Monik in a few months.  I told him that we can do a CTPA to rule out and related pleurisy sometimes cause chest pain.  I was carmen with him and told him that my suspicion is extremely low and that the yield is cannot be very poor.  He also hold off on this for now.    Thank you for allowing me to participate in the care of Amado LEIDY Philippekrishna    This note was completed in part using Dragon voice recognition software. Although reviewed after completion, some word and grammatical errors may occur.    Abelino Wayne MD  Cardiology    No orders of the defined types were placed in this encounter.      Orders Placed This Encounter   Medications     multivitamin w/minerals (MULTI-VITAMIN) tablet     Sig: Take 1 tablet by mouth daily       There are no discontinued medications.    No diagnosis found.    CURRENT MEDICATIONS:  Current Outpatient Medications   Medication Sig Dispense Refill     aspirin (ASA) 81 MG EC tablet Take 1 tablet (81 mg) by mouth daily 90 tablet 3     atorvastatin (LIPITOR) 20 MG tablet Take 1 tablet (20 mg) by mouth every evening 90 tablet 3     clonazePAM (KLONOPIN) 0.5 MG tablet Take 1 tablet (0.5 mg) by mouth 2 times daily as needed for anxiety (Use infrequently for more acute anxiety) 20 tablet 0     clopidogrel (PLAVIX) 75 MG tablet Take 1 tablet (75 mg) by mouth daily 90 tablet 3     colchicine (MITIGARE) 0.6 MG capsule Take 1 capsule (0.6 mg) by mouth daily 90 capsule 0     metoprolol succinate  ER (TOPROL-XL) 25 MG 24 hr tablet Take 0.5 tablets (12.5 mg) by mouth daily 90 tablet 1     multivitamin w/minerals (MULTI-VITAMIN) tablet Take 1 tablet by mouth daily       sertraline (ZOLOFT) 100 MG tablet Take half tab by mouth daily for 2 weeks then increase to full tab daily as tolerated. 28 tablet 0     sertraline (ZOLOFT) 100 MG tablet Take 1 tablet (100 mg) by mouth daily 30 tablet 1     nitroGLYcerin (NITROSTAT) 0.4 MG sublingual tablet For chest pain place 1 tablet under the tongue every 5 minutes for 3 doses. If symptoms persist 5 minutes after 1st dose call 911. (Patient not taking: Reported on 3/11/2021) 30 tablet 0       ALLERGIES     Allergies   Allergen Reactions     Niacin Unknown     Patient gets flushed and red in the face.       PAST MEDICAL HISTORY:  Past Medical History:   Diagnosis Date     Anxiety      CAD (coronary artery disease)     Status post stenting of proximal right coronary artery complex ruptured plaque on 23 April 2020.     Hyperlipidemia LDL goal <70      Intestinal infection due to Clostridium difficile      Pharyngoesophageal dysphagia        PAST SURGICAL HISTORY:  Past Surgical History:   Procedure Laterality Date     BACK SURGERY      L5-S1 disk herniation      CV CORONARY ANGIOGRAM N/A 4/23/2020    Procedure: Coronary Angiogram;  Surgeon: Derek Walker MD;  Location: [facility] CARDIAC C[facility] LAB     CV INTRAVASULAR ULTRASOUND N/A 4/23/2020    Procedure: Intravascular Ultrasound;  Surgeon: Derek Walker MD;  Location: [facility] CARDIAC CATH LAB     CV PCI STENT DRUG ELUTING       CV PCI STENT DRUG ELUTING N/A 4/23/2020    Procedure: Percutaneous Coronary Intervention Stent Drug Eluting;  Surgeon: Derek Walker MD;  Location:  HEART CARDIAC CATH LAB       FAMILY HISTORY:  Family History   Problem Relation Age of Onset     Family History Negative Father      Deep Vein Thrombosis Father      Atrial fibrillation Father      Family History Negative Mother       Family History Negative Brother      Deep Vein Thrombosis Sister      Ovarian Cancer Maternal Grandmother      Coronary Artery Disease Maternal Grandfather        SOCIAL HISTORY:  Social History     Socioeconomic History     Marital status:      Spouse name: Not on file     Number of children: 0     Years of education: Not on file     Highest education level: High school graduate   Occupational History     Occupation:    Social Needs     Financial resource strain: Not hard at all     Food insecurity     Worry: Never true     Inability: Never true     Transportation needs     Medical: No     Non-medical: No   Tobacco Use     Smoking status: Former Smoker     Packs/day: 1.00     Years: 15.00     Pack years: 15.00     Types: Cigarettes     Start date:      Quit date: 3/17/1993     Years since quittin.0     Smokeless tobacco: Never Used   Substance and Sexual Activity     Alcohol use: No     Drug use: No     Sexual activity: Yes     Partners: Female   Lifestyle     Physical activity     Days per week: 7 days     Minutes per session: 60 min     Stress: To some extent   Relationships     Social connections     Talks on phone: Not on file     Gets together: Not on file     Attends Protestant service: Not on file     Active member of club or organization: Not on file     Attends meetings of clubs or organizations: Not on file     Relationship status: Not on file     Intimate partner violence     Fear of current or ex partner: No     Emotionally abused: No     Physically abused: No     Forced sexual activity: No   Other Topics Concern      Service Not Asked     Blood Transfusions Not Asked     Caffeine Concern Not Asked     Occupational Exposure Not Asked     Hobby Hazards Not Asked     Sleep Concern Not Asked     Stress Concern Not Asked     Weight Concern Not Asked     Special Diet Not Asked     Back Care Not Asked     Exercise Not Asked     Bike Helmet Not Asked     Seat  Belt Yes     Self-Exams Not Asked     Parent/sibling w/ CABG, MI or angioplasty before 65F 55M? Not Asked   Social History Narrative    SD CHECKED REGULARLY.    GUNS UNLOADED IN CLOSET AT HOME.       Review of Systems:  Skin:  Negative       Eyes:  Negative      ENT:  Negative      Respiratory:  Negative shortness of breath per wife- when he has been talking for a while.   Cardiovascular:    Positive for;lightheadedness;chest pain occasional quick sharp in chest right when he gets up in the morning.lightheaded not as much  Gastroenterology: Negative      Genitourinary:  Negative      Musculoskeletal:  Negative      Neurologic:  Negative      Psychiatric:  Positive for anxiety    Heme/Lymph/Imm:  Negative      Endocrine:  Negative        Physical Exam:  Vitals: /76   Pulse 62   Constitutional: awake, alert, no distress  Head: Normocephalic, atraumatic  Eyes: Conjunctivae and lids unremarkable, sclera white  ENT: No pallor or cyanosis  Respiratory: Good chest movement, no distress  Cardiac: Regular rate and rhythm, S1-S2 normal. No murmurs gallops or rubs.   No pedal edema.   Extremities and musculoskeletal: No gross motor deficit  Neurological.  Affect normal  Psych: Alert and oriented x3    Recent Lab Results:  LIPID RESULTS:  Lab Results   Component Value Date    CHOL 134 10/19/2020    HDL 40 10/19/2020    LDL 66 10/19/2020    TRIG 142 10/19/2020    CHOLHDLRATIO 6 (H) 10/09/2002       LIVER ENZYME RESULTS:  Lab Results   Component Value Date    AST 25 10/19/2020    ALT 48 10/19/2020       CBC RESULTS:  Lab Results   Component Value Date    WBC 4.1 08/12/2020    RBC 5.11 08/12/2020    HGB 15.6 08/12/2020    HCT 46.5 08/12/2020    MCV 91 08/12/2020    MCH 30.5 08/12/2020    MCHC 33.5 08/12/2020    RDW 11.9 08/12/2020     08/12/2020       BMP RESULTS:  Lab Results   Component Value Date     10/19/2020    POTASSIUM 3.7 10/19/2020    CHLORIDE 104 10/19/2020    CO2 31 10/19/2020    ANIONGAP 4  10/19/2020    GLC 89 10/19/2020    BUN 11 10/19/2020    CR 0.74 10/19/2020    GFRESTIMATED >90 10/19/2020    GFRESTBLACK >90 10/19/2020    DANIELITO 9.0 10/19/2020        A1C RESULTS:  Lab Results   Component Value Date    A1C 5.0 04/22/2020       INR RESULTS:  No results found for: INR    CC  No referring provider defined for this encounter.    All medical records were reviewed in detail and discussed with the patient. Greater than 30 mins were spent with the patient, 50% of this time was spent on counseling and coordination of care.  After visit summary was printed and given to the patient.

## 2021-03-11 NOTE — LETTER
3/11/2021    Ildefonso Lucas MD, MD  [address] [facility] [address]  [facility] [address]    RE: Amado FORBES Jose Alfredo       Dear Colleague,    I had the pleasure of seeing Amado Veliz in the [facility].    HPI and Plan:   Today I had the pleasure of seeing Amado Veliz at [facility]. He is a pleasant 60 year old patient with a past medical history of coronary artery disease status post stenting of proximal RCA in 04/2020, hyperlipidemia, anxiety and remote tobacco abuse who presents to the clinic for a follow-up visit.    Since undergoing PCI of the proximal RCA with a 4.0 x 16 mm Synergy drug-eluting stent the patient has continued to report occasional, sharp, substernal chest pain only when going up the hill.  We have done extensive work-up which has included a nuclear stress test and most recently a cardiac MRI with stress all of which have failed to show any reversible ischemia.  The MRI was also negative for any scar.  In terms of treatment, we have tried multiple things.  He did not derive any benefit from Imdur and got headaches.  He also tried Norvasc which again was not beneficial.  Ranexa was not covered.  The only thing that has helped his colchicine, even though, ESR and CRP are all negative.  He has been on twice a day of colchicine for prolonged period of time.  We recently stopped colchicine which led to recurrence of symptoms and led to reinitiation of colchicine.  He noticed immediate improvement after restarting it.    Today, he tells me that he is overall feeling well and does not have any complaints besides what mentioned above.  He denies shortness of breath, palpitations, PND, orthopnea, presyncope, syncope lower extremity edema.  He wonders about stopping Plavix and metoprolol.    Assessment and plan  1.  Coronary disease s/p PCI of proximal RCA-followed by negative nuclear stress test and cardiac MRI  2.  Ongoing  atypical chest pain-relieved with colchicine  3.  Hyperlipidemia-well controlled  4.  Anxiety-controlled    Given that multiple types of stress test have been negative my suspicion for ischemia is extremely low.  Small vessel disease although a possibility is also unlikely given that his symptoms did not improve with Imdur or Norvasc.  Moreover, the fact that colchicine makes his pain better is another reason to believe that this pain is not ischemia related.  I discussed all this with him.  I told him to cut back on colchicine and take it every other day to see if he is able to tolerate that.  If he is able to tolerate it without worsening of his symptoms we will continue to cut back on it and ultimately stop.  I will have him come back and see him Monik in a few months.  I told him that we can do a CTPA to rule out and related pleurisy sometimes cause chest pain.  I was carmen with him and told him that my suspicion is extremely low and that the yield is cannot be very poor.  He also hold off on this for now.    Thank you for allowing me to participate in the care of Amado LEIDY Philippekrishna    This note was completed in part using Dragon voice recognition software. Although reviewed after completion, some word and grammatical errors may occur.    Abelino aWyne MD  Cardiology    No orders of the defined types were placed in this encounter.      Orders Placed This Encounter   Medications     multivitamin w/minerals (MULTI-VITAMIN) tablet     Sig: Take 1 tablet by mouth daily       There are no discontinued medications.    No diagnosis found.    CURRENT MEDICATIONS:  Current Outpatient Medications   Medication Sig Dispense Refill     aspirin (ASA) 81 MG EC tablet Take 1 tablet (81 mg) by mouth daily 90 tablet 3     atorvastatin (LIPITOR) 20 MG tablet Take 1 tablet (20 mg) by mouth every evening 90 tablet 3     clonazePAM (KLONOPIN) 0.5 MG tablet Take 1 tablet (0.5 mg) by mouth 2 times daily as needed for anxiety  (Use infrequently for more acute anxiety) 20 tablet 0     clopidogrel (PLAVIX) 75 MG tablet Take 1 tablet (75 mg) by mouth daily 90 tablet 3     colchicine (MITIGARE) 0.6 MG capsule Take 1 capsule (0.6 mg) by mouth daily 90 capsule 0     metoprolol succinate ER (TOPROL-XL) 25 MG 24 hr tablet Take 0.5 tablets (12.5 mg) by mouth daily 90 tablet 1     multivitamin w/minerals (MULTI-VITAMIN) tablet Take 1 tablet by mouth daily       sertraline (ZOLOFT) 100 MG tablet Take half tab by mouth daily for 2 weeks then increase to full tab daily as tolerated. 28 tablet 0     sertraline (ZOLOFT) 100 MG tablet Take 1 tablet (100 mg) by mouth daily 30 tablet 1     nitroGLYcerin (NITROSTAT) 0.4 MG sublingual tablet For chest pain place 1 tablet under the tongue every 5 minutes for 3 doses. If symptoms persist 5 minutes after 1st dose call 911. (Patient not taking: Reported on 3/11/2021) 30 tablet 0       ALLERGIES     Allergies   Allergen Reactions     Niacin Unknown     Patient gets flushed and red in the face.       PAST MEDICAL HISTORY:  Past Medical History:   Diagnosis Date     Anxiety      CAD (coronary artery disease)     Status post stenting of proximal right coronary artery complex ruptured plaque on 23 April 2020.     Hyperlipidemia LDL goal <70      Intestinal infection due to Clostridium difficile      Pharyngoesophageal dysphagia        PAST SURGICAL HISTORY:  Past Surgical History:   Procedure Laterality Date     BACK SURGERY      L5-S1 disk herniation      CV CORONARY ANGIOGRAM N/A 4/23/2020    Procedure: Coronary Angiogram;  Surgeon: Derek Walker MD;  Location: [facility] CARDIAC CATH LAB     CV INTRAVASULAR ULTRASOUND N/A 4/23/2020    Procedure: Intravascular Ultrasound;  Surgeon: Derek Walker MD;  Location: [facility] CARDIAC CATH LAB     CV PCI STENT DRUG ELUTING       CV PCI STENT DRUG ELUTING N/A 4/23/2020    Procedure: Percutaneous Coronary Intervention Stent Drug Eluting;  Surgeon: Aaron  Derek Mclean MD;  Location:  HEART CARDIAC CATH LAB       FAMILY HISTORY:  Family History   Problem Relation Age of Onset     Family History Negative Father      Deep Vein Thrombosis Father      Atrial fibrillation Father      Family History Negative Mother      Family History Negative Brother      Deep Vein Thrombosis Sister      Ovarian Cancer Maternal Grandmother      Coronary Artery Disease Maternal Grandfather        SOCIAL HISTORY:  Social History     Socioeconomic History     Marital status:      Spouse name: Not on file     Number of children: 0     Years of education: Not on file     Highest education level: High school graduate   Occupational History     Occupation:    Social Needs     Financial resource strain: Not hard at all     Food insecurity     Worry: Never true     Inability: Never true     Transportation needs     Medical: No     Non-medical: No   Tobacco Use     Smoking status: Former Smoker     Packs/day: 1.00     Years: 15.00     Pack years: 15.00     Types: Cigarettes     Start date:      Quit date: 3/17/1993     Years since quittin.0     Smokeless tobacco: Never Used   Substance and Sexual Activity     Alcohol use: No     Drug use: No     Sexual activity: Yes     Partners: Female   Lifestyle     Physical activity     Days per week: 7 days     Minutes per session: 60 min     Stress: To some extent   Relationships     Social connections     Talks on phone: Not on file     Gets together: Not on file     Attends [employer] service: Not on file     Active member of club or organization: Not on file     Attends meetings of clubs or organizations: Not on file     Relationship status: Not on file     Intimate partner violence     Fear of current or ex partner: No     Emotionally abused: No     Physically abused: No     Forced sexual activity: No   Other Topics Concern      Service Not Asked     Blood Transfusions Not Asked     Caffeine Concern Not  Asked     Occupational Exposure Not Asked     Hobby Hazards Not Asked     Sleep Concern Not Asked     Stress Concern Not Asked     Weight Concern Not Asked     Special Diet Not Asked     Back Care Not Asked     Exercise Not Asked     Bike Helmet Not Asked     Seat Belt Yes     Self-Exams Not Asked     Parent/sibling w/ CABG, MI or angioplasty before 65F 55M? Not Asked   Social History Narrative    SD CHECKED REGULARLY.    GUNS UNLOADED IN CLOSET AT HOME.       Review of Systems:  Skin:  Negative       Eyes:  Negative      ENT:  Negative      Respiratory:  Negative shortness of breath per wife- when he has been talking for a while.   Cardiovascular:    Positive for;lightheadedness;chest pain occasional quick sharp in chest right when he gets up in the morning.lightheaded not as much  Gastroenterology: Negative      Genitourinary:  Negative      Musculoskeletal:  Negative      Neurologic:  Negative      Psychiatric:  Positive for anxiety    Heme/Lymph/Imm:  Negative      Endocrine:  Negative        Physical Exam:  Vitals: /76   Pulse 62   Constitutional: awake, alert, no distress  Head: Normocephalic, atraumatic  Eyes: Conjunctivae and lids unremarkable, sclera white  ENT: No pallor or cyanosis  Respiratory: Good chest movement, no distress  Cardiac: Regular rate and rhythm, S1-S2 normal. No murmurs gallops or rubs.   No pedal edema.   Extremities and musculoskeletal: No gross motor deficit  Neurological.  Affect normal  Psych: Alert and oriented x3    Recent Lab Results:  LIPID RESULTS:  Lab Results   Component Value Date    CHOL 134 10/19/2020    HDL 40 10/19/2020    LDL 66 10/19/2020    TRIG 142 10/19/2020    CHOLHDLRATIO 6 (H) 10/09/2002       LIVER ENZYME RESULTS:  Lab Results   Component Value Date    AST 25 10/19/2020    ALT 48 10/19/2020       CBC RESULTS:  Lab Results   Component Value Date    WBC 4.1 08/12/2020    RBC 5.11 08/12/2020    HGB 15.6 08/12/2020    HCT 46.5 08/12/2020    MCV 91 08/12/2020     MCH 30.5 08/12/2020    MCHC 33.5 08/12/2020    RDW 11.9 08/12/2020     08/12/2020       BMP RESULTS:  Lab Results   Component Value Date     10/19/2020    POTASSIUM 3.7 10/19/2020    CHLORIDE 104 10/19/2020    CO2 31 10/19/2020    ANIONGAP 4 10/19/2020    GLC 89 10/19/2020    BUN 11 10/19/2020    CR 0.74 10/19/2020    GFRESTIMATED >90 10/19/2020    GFRESTBLACK >90 10/19/2020    DANIELITO 9.0 10/19/2020        A1C RESULTS:  Lab Results   Component Value Date    A1C 5.0 04/22/2020       INR RESULTS:  No results found for: INR    All medical records were reviewed in detail and discussed with the patient. Greater than 30 mins were spent with the patient, 50% of this time was spent on counseling and coordination of care.  After visit summary was printed and given to the patient.        Thank you for allowing me to participate in the care of your patient.      Sincerely,     Abelino Wayne MD     [facility]    cc:   No referring provider defined for this encounter.

## 2021-03-15 ENCOUNTER — VIRTUAL VISIT (OUTPATIENT)
Dept: PSYCHIATRY | Facility: CLINIC | Age: 61
End: 2021-03-15
Attending: INTERNAL MEDICINE
Payer: COMMERCIAL

## 2021-03-15 ENCOUNTER — VIRTUAL VISIT (OUTPATIENT)
Dept: PSYCHOLOGY | Facility: CLINIC | Age: 61
End: 2021-03-15
Attending: INTERNAL MEDICINE
Payer: COMMERCIAL

## 2021-03-15 DIAGNOSIS — F41.1 GAD (GENERALIZED ANXIETY DISORDER): ICD-10-CM

## 2021-03-15 DIAGNOSIS — F41.1 GAD (GENERALIZED ANXIETY DISORDER): Primary | ICD-10-CM

## 2021-03-15 PROCEDURE — 99213 OFFICE O/P EST LOW 20 MIN: CPT | Mod: 95 | Performed by: PSYCHIATRY & NEUROLOGY

## 2021-03-15 PROCEDURE — 90832 PSYTX W PT 30 MINUTES: CPT | Mod: 95 | Performed by: PSYCHOLOGIST

## 2021-03-15 RX ORDER — SERTRALINE HYDROCHLORIDE 100 MG/1
100 TABLET, FILM COATED ORAL DAILY
Qty: 90 TABLET | Refills: 0 | Status: SHIPPED | OUTPATIENT
Start: 2021-03-15 | End: 2021-03-22 | Stop reason: DRUGHIGH

## 2021-03-15 NOTE — PATIENT INSTRUCTIONS
Treatment Plan:    Continue sertraline 100 mg daily for anxiety.      Continue with clonazepam 0.25-0.5 mg up to 2 times daily as needed for severe anxiety/panic.  Try not to use more than 2-3 times per week.    Recommend individual psychotherapy for additional support and development of nonpharmacologic coping skills and strategies.    Continue working closely with your primary care provider and cardiologist to monitor any ongoing chest pains and follow their medical recommendations regarding your chest pains.    Continue all other cares per primary care provider.     Continue all other medications as reviewed per electronic medical record today.     Safety plan reviewed. To the Emergency Department as needed or call after hours crisis line at [phone] or [phone]. [place] Crisis Text Line. Text [place] to 948306 or Suicide LifeLine Chat: [email or web address].org/chat    Schedule an appointment with me in 6 weeks or sooner as needed. Call [facility] at [phone] to schedule.    Follow up with primary care provider as planned or for acute medical concerns.    Call the psychiatric nurse line with medication questions or concerns at [phone].    [employer] may be used to communicate with your provider, but this is not intended to be used for emergencies.    Risks of benzodiazepine (Ativan, Xanax, Klonopin, Valium, etc) use including, but not limited to, sedation, tolerance, risk for addiction/dependence. Do not drink alcohol while taking benzodiazepines due to risk of trouble breathing and potential death. Do not drive or operate heavy machinery until it is known how the drug affects you. Discuss with physician or pharmacist before ever taking a benzodiazepine with a narcotic/opioid pain medication.

## 2021-03-15 NOTE — PROGRESS NOTES
"Amado Veliz is a 60 year old who is being evaluated via a billable telephone visit.      What phone number would you like to be contacted at? See Epic     How would you like to obtain your AVS? [facility]          Outpatient Psychiatric Progress Note    Name: Amado Veliz   : 1960                    Primary Care Provider: Ildefonso Lucas MD, MD   Therapist: None     PHQ-9 scores:  PHQ-9 SCORE 2020   PHQ-9 Total Score 5       TAJ-7 scores:  TAJ-7 SCORE 2020 10/23/2020   Total Score 13 10       Patient Identification:  Patient is a 60 year old,   White American male  who presents for return visit with me.  Patient is currently retired. Patient attended the phone/video session alone. Patient prefers to be called: \"Bassam\".    Interim History:  I last saw Amado Veliz for outpatient psychiatry Consultation on 21. During that appointment, we:      Discontinue Celexa/citalopram.  Take 10 mg of citalopram daily WITH sertraline 50 mg daily for 2 weeks.  Then stop citalopram after those 2 weeks and increase sertraline to 100 mg daily for anxiety.    Start sertraline for anxiety.  Start 50 mg daily for 2 weeks (take with your 10 mg of citalopram for those 2 weeks), then increase sertraline to 100 mg daily as tolerated.    Continue with clonazepam 0.25-0.5 mg up to 2 times daily as needed for severe anxiety/panic.  Try not to use more than 2-3 times per week.    Recommend individual psychotherapy for additional support and development of nonpharmacologic coping skills and strategies.    3/15: Pt reports feeling about the same on the sertraline as he did on the Celexa. Anxiety continues to be mostly health-related.  He does report overall his medical symptoms have been a little bit better which does lead to a little less anxiety.  He is hoping to be able to stop Plavix at some point.  Does a lot of research online regarding his health and medications.  This does sometimes make him more " "anxious.  Continues to use clonazepam sparingly.  Has not used any in about a week or so.  Only uses for very severe anxiety.  \"If I had it my way, I would not be on any medication at all.\"  Patient does desire to be on as few medications as possible.  He is hoping that as the COVID pandemic continues to improve over time, and once he is fully vaccinated, that much of his anxiety will start to decrease. He denies any major negative side effects from sertraline.  Denies any thoughts of suicide/safety concerns.  Denies any problematic drug or alcohol use.    Per [facility], Dr. Amauri Herrera, during today's team-based visit:  Noted that he had some increased anxiety switching from citalopram to sertraline. His wife has said that he seemed better on citalopram. He got his first COVID vaccine shot and noted some anxiety about who administered the shot (pharmacist not a nurse), where she gave him the shot (\"it may have been too high up on the shoulder\"), and the type of needle used. He gave a few other examples of how he has difficulty trusting what other providers are recommending for his treatment. He asked about why he tends to research everything and if this is \"normal.\" We processed his experiences and whether researching was causing significant distress or interfering with his day to day activities. He denied either situation but rather indicated he felt it unnecessary.     Psychiatric ROS:  Amado Veliz reports mood has been: Overall somewhat stable, see HPI above  Anxiety has been: Overall manageable, see HPI above  Sleep has been: Okay  Marleni sxs: None  Psychosis sxs: None  ADHD/ADD sxs: None  PTSD sxs: None  PHQ9 and GAD7 scores were reviewed today if completed.   Medication side effects: Denies  Current stressors include: Symptoms, see HPI above   Coping mechanisms and supports include: Family and Hobbies    Current medications include:   Current Outpatient Medications   Medication Sig     aspirin (ASA) 81 MG EC " tablet Take 1 tablet (81 mg) by mouth daily     atorvastatin (LIPITOR) 20 MG tablet Take 1 tablet (20 mg) by mouth every evening     clonazePAM (KLONOPIN) 0.5 MG tablet Take 1 tablet (0.5 mg) by mouth 2 times daily as needed for anxiety (Use infrequently for more acute anxiety)     clopidogrel (PLAVIX) 75 MG tablet Take 1 tablet (75 mg) by mouth daily     colchicine (MITIGARE) 0.6 MG capsule Take 1 capsule (0.6 mg) by mouth daily     metoprolol succinate ER (TOPROL-XL) 25 MG 24 hr tablet Take 0.5 tablets (12.5 mg) by mouth daily     multivitamin w/minerals (MULTI-VITAMIN) tablet Take 1 tablet by mouth daily     nitroGLYcerin (NITROSTAT) 0.4 MG sublingual tablet For chest pain place 1 tablet under the tongue every 5 minutes for 3 doses. If symptoms persist 5 minutes after 1st dose call 911. (Patient not taking: Reported on 3/11/2021)     sertraline (ZOLOFT) 100 MG tablet Take half tab by mouth daily for 2 weeks then increase to full tab daily as tolerated.     sertraline (ZOLOFT) 100 MG tablet Take 1 tablet (100 mg) by mouth daily     No current facility-administered medications for this visit.        The [place] Prescription Monitoring Program has been reviewed and there are no concerns about diversionary activity for controlled substances at this time.   11/27/2020 1 11/27/2020 11/30/2020 Clonazepam 0.5 Mg Tablet  20.00 10 Pe Suman [id number] [facility] ([id number]) 0/0 2.00 [place]  07/26/2020 1 07/26/2020 07/26/2020 Diazepam 5 Mg Tablet  6.00 1 Mi Fit [id number] [facility] ([id number]) 0/0 3.00 [place]  07/01/2020 1 07/01/2020 07/02/2020 Clonazepam 0.5 Mg Tablet  20.00 10 Pe Suman [id number] [facility] ([id number]) 0/0 2.00 [place]    Past Medical/Surgical History:  Past Medical History:   Diagnosis Date     Anxiety      CAD (coronary artery disease)     Status post stenting of proximal right coronary artery complex ruptured plaque on 23 April 2020.     Hyperlipidemia LDL goal <70      Intestinal infection due to Clostridium difficile       Pharyngoesophageal dysphagia       has a past medical history of Anxiety, CAD (coronary artery disease), Hyperlipidemia LDL goal <70, Intestinal infection due to Clostridium difficile, and Pharyngoesophageal dysphagia. He also has no past medical history of Alcohol dependence (H) or Substance abuse (H).    Social History:  Reviewed. No changes to social history except as noted above in HPI.    Vital Signs:   None. This is phone/video visit.     Labs:  Most recent laboratory results reviewed and no new labs.     Review of Systems:  10 systems (general, cardiovascular, respiratory, eyes, ENT, endocrine, GI, , M/S, neurological) were reviewed. Most pertinent finding(s) is/are: Chronic, intermittent atypical chest pains. The remaining systems are all unremarkable.    Mental Status Examination (limited as this is by phone/video):  Attitude:  cooperative   Oriented to:  person, place, time, and situation  Attention Span and Concentration:  normal  Speech:  clear, coherent, regular rate, rhythm, and volume  Language: intact  Mood:  anxious  Affect:  appropriate and in normal range and mood congruent  Associations:  no loose associations  Thought Process:  logical, linear and goal oriented  Thought Content:  no evidence of suicidal ideation or homicidal ideation, no evidence of psychotic thought, no auditory hallucinations present and no visual hallucinations present  Recent and Remote Memory:  Intact to interview. Not formally assessed. No amnesia.  Fund of Knowledge: appropriate  Insight:  good  Judgment:  intact, adequate for safety  Impulse Control:  intact    Suicide Risk Assessment:  Today Amado Veliz reports no suicidal ideation. Based on all available evidence including the factors cited above, Amado Veliz does not appear to be at imminent risk for self-harm, does not meet criteria for a 72-hr hold, and therefore remains appropriate for ongoing outpatient level of care.  A thorough assessment of  risk factors related to suicide and self-harm have been reviewed and are noted above. The patient convincingly denies suicidality on several occasions. Local community safety resources printed and reviewed for patient to use if needed. There was no deceit detected, and the patient presented in a manner that was believable.     DSM5 Diagnosis:  300.02 (F41.1) Generalized Anxiety Disorder   R/O Panic Disorder  R/O Illness Anxiety Disorder    Medical comorbidities include:   Patient Active Problem List    Diagnosis Date Noted     Anxiety 09/08/2020     Priority: Medium     Acute reaction to stress 09/03/2020     Priority: Medium     Atypical chest pain 08/05/2020     Priority: Medium     Unstable angina (H) 04/22/2020     Priority: Medium     Pharyngoesophageal dysphagia 04/22/2020     Priority: Medium     HYPERLIPIDEMIA LDL GOAL <160 10/31/2010     Priority: Medium     Mixed hyperlipidemia 06/10/2005     Priority: Medium     Hemorrhage of rectum and anus 10/09/2003     Priority: Medium       Psychosocial & Contextual Factors: See HPI above  Assessment:  Amado Sesaykrishna reports overall just slight symptom improvement since last visit.  Feels about the same on sertraline 100 mg daily as he did Celexa 20 mg daily.  Due to his ongoing cardiac issues and current age of 60, did not want to increase his previous Celexa higher than 20 mg daily.  Discussed possibility of further titrating sertraline to 150 mg daily.  He was hesitant at this time since he ultimately would prefer to not be on any medications.  He overall feels like he is managing well enough at this time.  Still has anxiety but feels he has been able to manage okay.  He will continue to assess his symptoms and how distressing they are in order to continually evaluate the need for dose increase of sertraline.  We will keep his dose the same at this time and he will follow-up in about 6 weeks.  Can always message before his next appointment if interested in a  dose increase depending on his anxiety symptoms.  Still has some clonazepam left.  No concerns about his clonazepam use at this time.    Medication side effects and alternatives were reviewed. Health promotion activities recommended and reviewed today. All questions addressed. Education and counseling completed regarding risks and benefits of medications and psychotherapy options. Recommend therapy for additional support.     Treatment Plan:    Continue sertraline 100 mg daily for anxiety.      Continue with clonazepam 0.25-0.5 mg up to 2 times daily as needed for severe anxiety/panic.  Try not to use more than 2-3 times per week.    Recommend individual psychotherapy for additional support and development of nonpharmacologic coping skills and strategies.    Continue working closely with your primary care provider and cardiologist to monitor any ongoing chest pains and follow their medical recommendations regarding your chest pains.    Continue all other cares per primary care provider.     Continue all other medications as reviewed per electronic medical record today.     Safety plan reviewed. To the Emergency Department as needed or call after hours crisis line at [phone] or [phone]. [place] Crisis Text Line. Text [place] to 396339 or Suicide LifeLine Chat: suicidepreventionlifeline.org/chat    Schedule an appointment with me in 6 weeks or sooner as needed. Call [facility] at [phone] to schedule.    Follow up with primary care provider as planned or for acute medical concerns.    Call the psychiatric nurse line with medication questions or concerns at [phone].    MyChart may be used to communicate with your provider, but this is not intended to be used for emergencies.    Risks of benzodiazepine (Ativan, Xanax, Klonopin, Valium, etc) use including, but not limited to, sedation, tolerance, risk for addiction/dependence. Do not drink alcohol while taking benzodiazepines due to risk  of trouble breathing and potential death. Do not drive or operate heavy machinery until it is known how the drug affects you. Discuss with physician or pharmacist before ever taking a benzodiazepine with a narcotic/opioid pain medication.     Administrative Billing:   Phone Call/Video Duration: 8 Minutes  Start: 10:51a  Stop: 10:59a    Time spent with patient was 8 minutes and greater than 50% of time or 5 minutes was spent in counseling and coordination of care regarding above diagnoses and treatment plan.    Patient Status:  Patient will continue to be seen for ongoing consultation and stabilization.    Signed:   Ivon Cornell DO  [facility]    Disclaimer: This note consists of symbols derived from keyboarding, dictation and/or voice recognition software. As a result, there may be errors in the script that have gone undetected. Please consider this when interpreting information found in this chart.

## 2021-03-15 NOTE — PROGRESS NOTES
Collaborative Care Psychiatry Service ([facility])  March 15, 2021    Behavioral Health Clinician Progress Note    Patient Name: Amado Veliz      Telemedicine Visit: The patient's condition can be safely assessed and treated via synchronous audio and visual telemedicine encounter.      Reason for Telemedicine Visit: Services only offered telehealth    Originating Site (Patient Location): Patient's home    Distant Site (Provider Location): Provider Remote Setting    Consent:  The patient/guardian has verbally consented to: the potential risks and benefits of telemedicine (video visit) versus in person care; bill my insurance or make self-payment for services provided; and responsibility for payment of non-covered services.     Mode of Communication:  Video Conference via [employer]    As the provider I attest to compliance with applicable laws and regulations related to telemedicine.         Service Type:  Individual      Session Start Time: 09:45am  Session End Time: 10:05am      Session Length: 16 - 37      Attendees: Patient    Visit Activities (Refresh list every visit): [facility] Only    Diagnostic Assessment Date: 01/28/2021  See Flowsheets for today's PHQ-9 and TAJ-7 results  Previous PHQ-9:   PHQ-9 SCORE 9/8/2020   PHQ-9 Total Score 5       Previous TAJ-7:   TAJ-7 SCORE 9/8/2020 10/23/2020   Total Score 13 10       WHODAS  No flowsheet data found.     AUDIT  No flowsheet data found.    DATA  Extended Session (60+ minutes): No  Interactive Complexity: No  Crisis: No    Medication Compliance:  Yes      Chemical Use Review:   Substance Use: Chemical use reviewed, no active concerns identified      Tobacco Use: No current tobacco use.      Current Stressors / Issues:  Noted that he had some increased anxiety switching from citalopram to sertraline. His wife has said that he seemed better on citalopram. He got his first COVID vaccine shot and noted some anxiety about who administered the shot (pharmacist not a nurse),  "where she gave him the shot (\"it may have been too high up on the shoulder\"), and the type of needle used. He gave a few other examples of how he has difficulty trusting what other providers are recommending for his treatment. He asked about why he tends to research everything and if this is \"normal.\" We processed his experiences and whether researching was causing significant distress or interfering with his day to day activities. He denied either situation but rather indicated he felt it unnecessary.       Review of Symptoms per patient report as of 01/28/2021:  Depression:     No symptoms  Marleni:             No Symptoms  Psychosis:       No Symptoms  Anxiety:           Excessive worry, Nervousness, Physical complaints, such as headaches, stomachaches, muscle tension, Sleep disturbance, Ruminations and Poor concentration  Panic:              No symptoms  Post Traumatic Stress Disorder:  No Symptoms   Eating Disorder:          No Symptoms  ADD / ADHD:              No symptoms  Conduct Disorder:       No symptoms  Autism Spectrum Disorder:     No symptoms  Obsessive Compulsive Disorder:       Cleaning, Symetry and Not current; In the past; mowing lawn had to be in straight lines, fastidious, everything in its place    Changes in Health Issues:   None reported    Assessment: Current Emotional / Mental Status (status of significant symptoms):  Risk status (Self / Other harm or suicidal ideation)  Patient denies a history of suicidal ideation, suicide attempts, self-injurious behavior, homicidal ideation, homicidal behavior and and other safety concerns  Patient denies current fears or concerns for personal safety.  Patient denies current or recent suicidal ideation or behaviors.  Patient denies current or recent homicidal ideation or behaviors.  Patient denies current or recent self injurious behavior or ideation.  Patient denies other safety concerns.  A safety and risk management plan has not been developed at this " time, however patient was encouraged to call [facility] should there be a change in any of these risk factors.    Appearance:   Appropriate   Eye Contact:   Good   Psychomotor Behavior: Normal   Attitude:   Cooperative   Orientation:   All  Speech   Rate / Production: Normal    Volume:  Normal   Mood:    Anxious  Normal  Affect:    Appropriate   Thought Content:  Clear   Thought Form:  Coherent  Logical   Insight:    Fair     Diagnoses:  1. TAJ (generalized anxiety disorder)        Collateral Reports Completed:  Communicated with: Dr. Cornell    Plan: (Homework, other):  Patient was given information about behavioral services and encouraged to schedule a follow up appointment with the clinic [facility] in conjunction with next [facility] appointment.  He was also given information about mental health symptoms and treatment options .  CD Recommendations: No indications of CD issues.      Amauri Herrera PsyD, LP      Gary Herrera PsyD  March 15, 2021

## 2021-03-18 NOTE — TELEPHONE ENCOUNTER
"Please advise patient:    \"The vaccine should have entered the muscle tissue. even though it may not have been administered into the larger lower part of the deltoid muscle. It should be effective.\"  "

## 2021-03-22 ENCOUNTER — MYC MEDICAL ADVICE (OUTPATIENT)
Dept: PSYCHIATRY | Facility: CLINIC | Age: 61
End: 2021-03-22

## 2021-03-22 RX ORDER — SERTRALINE HYDROCHLORIDE 100 MG/1
150 TABLET, FILM COATED ORAL DAILY
COMMUNITY
End: 2021-04-27

## 2021-03-26 ENCOUNTER — VIRTUAL VISIT (OUTPATIENT)
Dept: INTERNAL MEDICINE | Facility: CLINIC | Age: 61
End: 2021-03-26
Payer: COMMERCIAL

## 2021-03-26 DIAGNOSIS — I25.10 CORONARY ARTERY DISEASE INVOLVING NATIVE CORONARY ARTERY OF NATIVE HEART WITHOUT ANGINA PECTORIS: ICD-10-CM

## 2021-03-26 DIAGNOSIS — F41.9 ANXIETY: Primary | ICD-10-CM

## 2021-03-26 DIAGNOSIS — I20.0 UNSTABLE ANGINA (H): ICD-10-CM

## 2021-03-26 DIAGNOSIS — R07.89 ATYPICAL CHEST PAIN: ICD-10-CM

## 2021-03-26 PROCEDURE — 99214 OFFICE O/P EST MOD 30 MIN: CPT | Mod: 95 | Performed by: INTERNAL MEDICINE

## 2021-03-26 RX ORDER — METOPROLOL SUCCINATE 25 MG/1
12.5 TABLET, EXTENDED RELEASE ORAL DAILY
Qty: 90 TABLET | Refills: 1 | Status: SHIPPED | OUTPATIENT
Start: 2021-03-26 | End: 2022-06-09

## 2021-03-26 RX ORDER — CLOPIDOGREL BISULFATE 75 MG/1
75 TABLET ORAL DAILY
Qty: 90 TABLET | Refills: 0 | Status: SHIPPED | OUTPATIENT
Start: 2021-03-26 | End: 2021-09-02

## 2021-03-26 RX ORDER — ATORVASTATIN CALCIUM 20 MG/1
20 TABLET, FILM COATED ORAL EVERY EVENING
Qty: 90 TABLET | Refills: 3 | Status: CANCELLED | OUTPATIENT
Start: 2021-03-26

## 2021-03-26 RX ORDER — CLOPIDOGREL BISULFATE 75 MG/1
75 TABLET ORAL DAILY
Qty: 90 TABLET | Refills: 3 | Status: CANCELLED | OUTPATIENT
Start: 2021-03-26

## 2021-03-26 ASSESSMENT — ANXIETY QUESTIONNAIRES
7. FEELING AFRAID AS IF SOMETHING AWFUL MIGHT HAPPEN: NOT AT ALL
1. FEELING NERVOUS, ANXIOUS, OR ON EDGE: MORE THAN HALF THE DAYS
2. NOT BEING ABLE TO STOP OR CONTROL WORRYING: MORE THAN HALF THE DAYS
5. BEING SO RESTLESS THAT IT IS HARD TO SIT STILL: NOT AT ALL
IF YOU CHECKED OFF ANY PROBLEMS ON THIS QUESTIONNAIRE, HOW DIFFICULT HAVE THESE PROBLEMS MADE IT FOR YOU TO DO YOUR WORK, TAKE CARE OF THINGS AT HOME, OR GET ALONG WITH OTHER PEOPLE: SOMEWHAT DIFFICULT
6. BECOMING EASILY ANNOYED OR IRRITABLE: NOT AT ALL
GAD7 TOTAL SCORE: 7
3. WORRYING TOO MUCH ABOUT DIFFERENT THINGS: MORE THAN HALF THE DAYS

## 2021-03-26 ASSESSMENT — PATIENT HEALTH QUESTIONNAIRE - PHQ9: 5. POOR APPETITE OR OVEREATING: SEVERAL DAYS

## 2021-03-26 NOTE — TELEPHONE ENCOUNTER
Stating he would like to continue on the Plavix for 3 more month as he [place] starting in [place] and does not want to have issues with clotting after stopping and being out of state. States he was the one who was asking if it could be stopped at the office visit last month, not Dr. Wayne. Stated he had been reading up on the drug data sheet of Plavix since then and now would really like to continue it until back in June. Will need one, 90 day script.     Dr. Wayne's note does not state to stop the medication at one year post stent. It does state that patient had brought up and discussed stopping Plavix and Metoprolol.

## 2021-03-26 NOTE — PROGRESS NOTES
Bassam is a 60 year old who is being evaluated via a billable video visit.      How would you like to obtain your AVS? Mail a copy  If the video visit is dropped, the invitation should be resent by: Text to cell phone: [phone]  Will anyone else be joining your video visit? No    Video Start Time: 0849    Assessment & Plan   (F41.9) Anxiety  (primary encounter diagnosis)  Comment: Continue sertraline as advised by Dr Cornell. Need to assess status after at least 4-6 weeks on current dose of sertraline before considering changing anxiolytic meds. May continue clonazepam prn.     (R07.89) Atypical chest pain  Comment: Continue metoprolol.   Plan: metoprolol succinate ER (TOPROL-XL) 25 MG 24 hr        tablet          (I20.0) Unstable angina (H)  (I25.10) Coronary artery disease involving native coronary artery of native heart without angina pectoris  Comment: He would feel more comfortable continuing Plavix for a full 15 months after stent placement. He will discuss this with cardiology.        There are no Patient Instructions on file for this visit.    Follow up (video visit) in 3 months    Ildefonso Lucas MD,   [facility]   Bassam is a 60 year old who presents for the following health issues   Patient is being seen for anxiety and high cholesterol.  HPI     Hyperlipidemia Follow-Up      Are you regularly taking any medication or supplement to lower your cholesterol?   Yes- atorvastatin    Are you having muscle aches or other side effects that you think could be caused by your cholesterol lowering medication?  No    Hypertension Follow-up      Do you check your blood pressure regularly outside of the clinic? Yes     Are you following a low salt diet? Yes    Are your blood pressures ever more than 140 on the top number (systolic) OR more   than 90 on the bottom number (diastolic), for example 140/90? No      How many servings of fruits and vegetables do you eat daily?  4 or  "more    On average, how many sweetened beverages do you drink each day (Examples: soda, juice, sweet tea, etc.  Do NOT count diet or artificially sweetened beverages)?   0    How many days per week do you exercise enough to make your heart beat faster? 6    How many minutes a day do you exercise enough to make your heart beat faster? 20 - 29    How many days per week do you miss taking your medication? 0    We reviewed ongoing symptoms of anxiety and atypical chest discomfort.     He saw Dr Cornell on 3/15. She had begun him on a 2-week taper off of citalopram at their first appointment on 1/28, simultaneously taking sertraline at 50 mg daily. After two weeks he stopped citalopram and raised sertraline to 1000 mg daily.,   Just this Monday after MyChart consultation with Dr Cornell he has raised his dose of sertraline from 100 mg to 150 mg daily.     He had felt \"about the same on citalopram as he had on sertraline 100 mg daily\". Dr Cornell had not recommended that his citalopram be raised from 20 mg to 40 mg daily.  The patient's wife thought he might possibly have been doing as well or slightly better on citalopram at 20 mg daily.      He realizes that it is still too early to assess benefits of sertraline at this new higher dose.     He had seen cardiology about two weeks ago. They had suggested that he consider trying to taper down and possibly eventually off of colchicine.   He still gets chest symptoms, not really exertional, which did seem a bit worse when he tried to reduce dose of colchicine to alternate day dosing.     We discussed that quite possibly his chest discomfort is related to anxiety and or musculoskeletal or other noncardiac cause.   He does still have clonazepam available but has taken this infrequently.     Past medical, family and social histories as well as medications reviewed and updated as needed.    Review of Systems   REVIEW OF SYSTEMS: The following systems have been completely reviewed and " are negative except as noted above:   Constitutional, respiratory, cardiovascular, musculoskeletal,psychiatric, and neurologic systems.        Objective           Vitals:  No vitals were obtained today due to virtual visit.    Physical Exam   GENERAL: Healthy, alert and no distress  EYES: Eyes grossly normal to inspection.  No discharge or erythema, or obvious scleral/conjunctival abnormalities.  RESP: No audible wheeze, cough, or visible cyanosis.  No visible retractions or increased work of breathing.    SKIN: Visible skin clear. No significant rash, abnormal pigmentation or lesions.  NEURO: Cranial nerves grossly intact.  Mentation and speech appropriate for age.  PSYCH: Mentation appears normal, affect normal/bright, judgement and insight intact, normal speech and appearance well-groomed.          Video-Visit Details    Type of service:  Video Visit    Video End Time: 0924  Total time of video visit with patient on 3/26 was 35 minutes, with an additional 10 minutes reviewing chart prior to and after appointment.    Originating Location (pt. Location): Home    Distant Location (provider location):  [facility]     Platform used for Video Visit: Nitro

## 2021-03-27 ASSESSMENT — ANXIETY QUESTIONNAIRES: GAD7 TOTAL SCORE: 7

## 2021-04-01 ENCOUNTER — VIRTUAL VISIT (OUTPATIENT)
Dept: PSYCHOLOGY | Facility: CLINIC | Age: 61
End: 2021-04-01
Payer: COMMERCIAL

## 2021-04-01 DIAGNOSIS — F41.1 GAD (GENERALIZED ANXIETY DISORDER): Primary | ICD-10-CM

## 2021-04-01 PROCEDURE — 90834 PSYTX W PT 45 MINUTES: CPT | Mod: 95

## 2021-04-01 NOTE — Clinical Note
"He had a \"brief adult diagnostic assessment\" done by a psychologist (Gary Herrera) on 1/28 -- that's enough for our purposes, right?    Nitin"

## 2021-04-02 NOTE — PROGRESS NOTES
Progress Note    Patient Name: Amado Veliz  Date: 21         Service Type: Individual      Session Start Time: 10:05 AM  Session End Time: 10:45 AM     Session Length: 40    Session #: 1    Attendees: Client attended alone    Service Modality:  Video Visit:      Provider verified identity through the following two step process.  Patient provided:  Patient  and Patient address    Telemedicine Visit: The patient's condition can be safely assessed and treated via synchronous audio and visual telemedicine encounter.      Reason for Telemedicine Visit: Services only offered telehealth    Originating Site (Patient Location): Patient's home    Distant Site (Provider Location): Provider Remote Setting    Consent:  The patient/guardian has verbally consented to: the potential risks and benefits of telemedicine (video visit) versus in person care; bill my insurance or make self-payment for services provided; and responsibility for payment of non-covered services.     Patient would like the video invitation sent by:  My Chart    Mode of Communication:  Video Conference via Qello    As the provider I attest to compliance with applicable laws and regulations related to telemedicine.     Treatment Plan Last Reviewed: new treatment plan in progress 2021  PHQ-9 / TAJ-7 :   PHQ 2020 10/23/2020   PHQ-9 Total Score 5 -   Q9: Thoughts of better off dead/self-harm past 2 weeks Not at all Not at all   Some encounter information is confidential and restricted. Go to Review Flowsheets activity to see all data.     TAJ-7 SCORE 10/23/2020 3/26/2021 3/31/2021   Total Score - - 6 (mild anxiety)   Total Score 10 7 -   Some encounter information is confidential and restricted. Go to Review Flowsheets activity to see all data.       DATA  Interactive Complexity: No  Crisis: No       Progress Since Last Session (Related to Symptoms / Goals / Homework):   Symptoms: NA    Homework:  "NA      Episode of Care Goals: Minimal progress - CONTEMPLATION (Considering change and yet undecided); Intervened by assessing the negative and positive thinking (ambivalence) about behavior change     Current / Ongoing Stressors and Concerns:   Generalized anxiety: \"I've had anxiety my whole life off and on\", \"I would say I probably am an excessive worrier\", \"I worry about things that could go wrong\". Became acute last year due to a health crisis: \"The whole thing started a year ago when I got a stent put in\". Last summer and fall \"The anxiety was just absolutely crushing\", \"I had constant chest pain, fullness, tightness in my chest\". Put on citalopram, \"I was still getting anxiety though not as much\". Switched to Zoloft in January, then increased dosage about a week and a half ago. Now anxiety is not as severe but still present, usually somaticized: \"I pretty much gauge how bad the anxiety is because it shows up in me as chest pain\"     Difficulty tolerating uncertainty: \"What doesn't help me is I'm a bit of a perfectionist\", for example, patient reported being bothered by light switches turning off in the up position; in a room with mutiple switches he will change them all to align.     \"My life experiences have taught me to trust but verify\" -- e.g. recently got COVID vaccine and became worried about whether it was administered properly; spent a lot of time reading medical articles seeking confirmation     Treatment Objective(s) Addressed in This Session:   Use cognitive strategies identified in therapy to challenge anxious thoughts  Learn mindfulness-based strategies for tolerating and reducing the power of anxiety-related thoughts, sensations, experiences  Increase psychological flexibility and tolerance of uncertainty  Psychoeducate on anxiety  Build rapport and gather information     Intervention:   Welcomed and oriented patient to therapy. Assessed current symptoms and safety. Gathered information, built " "rapport and therapeutic alliance. Discussed and psychoeducated on anxiety, explained how experiential avoidance, worry, and the demand for certainty relate to one another and reinforce anxious feeling and behaving. Taught mindfulness strategies for tolerating and reducing reactivity to anxious thoughts and sensations. Taught \"worry time\" strategy.        ASSESSMENT: Current Emotional / Mental Status (status of significant symptoms):   Risk status (Self / Other harm or suicidal ideation)   Patient denies current fears or concerns for personal safety.   Patient denies current or recent suicidal ideation or behaviors.   Patient denies current or recent homicidal ideation or behaviors.   Patient denies current or recent self injurious behavior or ideation.   Patient denies other safety concerns.   Patient reports there has been no change in risk factors since their last session.     Patient reports there has been no change in protective factors since their last session.     Recommended that patient call 911 or go to the local ED should there be a change in any of these risk factors.     Appearance:   Appropriate    Eye Contact:   Good    Psychomotor Behavior: Normal    Attitude:   Cooperative    Orientation:   All   Speech    Rate / Production: Normal     Volume:  Normal    Mood:    Anxious    Affect:    Restricted  Worrisome    Thought Content:  Clear  Rumination  Compulsions   Thought Form:  Coherent  Goal Directed  Logical  Neurosis   Insight:    Good      Medication Review:   No changes to current psychiatric medication(s)     Medication Compliance:   Yes     Changes in Health Issues:   None reported     Chemical Use Review:   Substance Use: Chemical use reviewed, no active concerns identified      Tobacco Use: No current tobacco use.      Diagnosis:  1. TAJ (generalized anxiety disorder)      Collateral Reports Completed:   Not Applicable    PLAN: (Patient Tasks / Therapist Tasks / Other)  Patient will set aside 20 " "minutes of \"worry time\" tonight and write down worries that arise throughout the day.        TALHA Alan, [facility] 4/2/2021  Service Performed and Documented by [facility]-   Note reviewed and clinical supervision by TALHA Solitario [facility] 4/9/2021                                                     ______________________________________________________________________    Treatment Plan    Patient's Name: Amado Veliz  YOB: 1960    Date: 4/1/21    DSM5 Diagnoses: 300.02 (F41.1) Generalized Anxiety Disorder  Psychosocial / Contextual Factors: heart health concerns  WHODAS:   WHODAS 2.0 Total Score 3/31/2021   Total Score MyChart 13   Some encounter information is confidential and restricted. Go to Review Flowsheets activity to see all data.     Referral / Collaboration:  Referral to another professional/service is not indicated at this time..    Anticipated number of session or this episode of care: unknown      MeasurableTreatment Goal(s) related to diagnosis / functional impairment(s)  Goal 1: Patient will learn cognitive-behavioral and mindfulness-based strategies to tolerate, challenge, and reduce the power and predominance of anxiety-related internal experiences    I will know I've met my goal when...  (goal in progress)     Objective #A (Patient Action)    Patient will use \"worry time\" each day for 15 minutes of scheduled worry and then defer obsessive or anxious thinking until the next structured worry time.  Status: New - Date: 4/1/2021     Intervention(s)  Therapist will assign homework to adhere to structured worry time.    Objective #B  Patient will use cognitive strategies identified in therapy to challenge anxious thoughts.  Status: New - Date: 4/1/2021     Intervention(s)  Therapist will teach decatastrophizing and uncertainty tolerance.    Objective #C  Patient will learn acceptance and defusion strategies to lessen his reactivity to anxious internal experiences.  Status: New - " Date: 4/1/2021     Intervention(s)  Therapist will teach defusion and experiential acceptance strategies..      Patient has not reviewed nor agreed to the above plan -- drafted after session, to be reviewed next time.      TALHA Alan, LGSW 4/1/2021

## 2021-04-15 ENCOUNTER — VIRTUAL VISIT (OUTPATIENT)
Dept: PSYCHOLOGY | Facility: CLINIC | Age: 61
End: 2021-04-15
Payer: COMMERCIAL

## 2021-04-15 DIAGNOSIS — F41.1 GAD (GENERALIZED ANXIETY DISORDER): Primary | ICD-10-CM

## 2021-04-15 PROCEDURE — 90834 PSYTX W PT 45 MINUTES: CPT | Mod: 95

## 2021-04-15 NOTE — PROGRESS NOTES
"                                           Progress Note    Patient Name: Amado Veliz  Date: 4/15/2021         Service Type: Individual      Session Start Time: 8:05 AM  Session End Time: 8:50 AM     Session Length: 45    Session #: 2     Attendees: Client attended alone    Service Modality:  Video Visit:      Provider verified identity through the following two step process.  Patient provided:  Patient  and Patient address    Telemedicine Visit: The patient's condition can be safely assessed and treated via synchronous audio and visual telemedicine encounter.      Reason for Telemedicine Visit: Services only offered telehealth    Originating Site (Patient Location): Patient's home    Distant Site (Provider Location): Provider Remote Setting    Consent:  The patient/guardian has verbally consented to: the potential risks and benefits of telemedicine (video visit) versus in person care; bill my insurance or make self-payment for services provided; and responsibility for payment of non-covered services.     Patient would like the video invitation sent by:  My Chart    Mode of Communication:  Video Conference via Amwell    As the provider I attest to compliance with applicable laws and regulations related to telemedicine.     Treatment Plan Last Reviewed: 4/15/2021  PHQ-9 / TAJ-7 :   PHQ 10/23/2020 3/31/2021 4/15/2021   PHQ-9 Total Score - 1 1   Q9: Thoughts of better off dead/self-harm past 2 weeks Not at all Not at all Not at all     TAJ-7 SCORE 3/26/2021 3/31/2021 4/15/2021   Total Score - 6 (mild anxiety) 4 (minimal anxiety)   Total Score 7 6 4       DATA  Interactive Complexity: No  Crisis: No       Progress Since Last Session (Related to Symptoms / Goals / Homework):   Symptoms: Improving , \"my anxiety's probably moving in the right direction\", but \"I still get it, I still feel it\"    Homework: NA      Episode of Care Goals: Minimal progress - CONTEMPLATION (Considering change and yet undecided); " "Intervened by assessing the negative and positive thinking (ambivalence) about behavior change     Current / Ongoing Stressors and Concerns:     Reducing heart medication and blood thinners; planning on waiting til after trip to [place] to reduce some     Experiencing anxiety as chest pain, \"fullness, tightness in my chest\"     Treatment Objective(s) Addressed in This Session:   Use cognitive strategies identified in therapy to challenge anxious thoughts  Learn mindfulness-based strategies for tolerating and reducing the power of anxiety-related thoughts, sensations, experiences  Increase psychological flexibility and tolerance of uncertainty  Psychoeducate on anxiety  Build rapport and gather information     Intervention:   Taught mindfulness strategies for tolerating and reducing reactivity to anxious thoughts and sensations.   ACT: Taught and led expansion exercise.        ASSESSMENT: Current Emotional / Mental Status (status of significant symptoms):   Risk status (Self / Other harm or suicidal ideation)   Patient denies current fears or concerns for personal safety.   Patient denies current or recent suicidal ideation or behaviors.   Patient denies current or recent homicidal ideation or behaviors.   Patient denies current or recent self injurious behavior or ideation.   Patient denies other safety concerns.   Patient reports there has been no change in risk factors since their last session.     Patient reports there has been no change in protective factors since their last session.     Recommended that patient call 911 or go to the local ED should there be a change in any of these risk factors.     Appearance:   Appropriate    Eye Contact:   Good    Psychomotor Behavior: Normal    Attitude:   Cooperative    Orientation:   All   Speech    Rate / Production: Normal     Volume:  Normal    Mood:    Anxious    Affect:    Restricted  Worrisome    Thought Content:  Clear  Rumination    Thought Form:  Coherent  Goal " "Directed  Logical  Neurosis   Insight:    Good      Medication Review:   No changes to current psychiatric medication(s)     Medication Compliance:   Yes     Changes in Health Issues:   None reported     Chemical Use Review:   Substance Use: Chemical use reviewed, no active concerns identified      Tobacco Use: No current tobacco use.      Diagnosis:  1. TAJ (generalized anxiety disorder)      Collateral Reports Completed:   Not Applicable    PLAN: (Patient Tasks / Therapist Tasks / Other)  Patient will review and practice expansion exercise as taught in therapy.        TALHA Alan, [facility] 4/16/2021  Service Performed and Documented by [facility]-   Note reviewed and clinical supervision by TALHA Solitario [facility] 4/25/2021                                                      ______________________________________________________________________    Treatment Plan    Patient's Name: Amado Veliz  YOB: 1960    Date: 4/15/21    DSM5 Diagnoses: 300.02 (F41.1) Generalized Anxiety Disorder  Psychosocial / Contextual Factors: heart health concerns  WHODAS:   WHODAS 2.0 Total Score 3/31/2021 4/15/2021   Total Score MyChart 13 13   Some encounter information is confidential and restricted. Go to Review Flowsheets activity to see all data.     Referral / Collaboration:  Referral to another professional/service is not indicated at this time..    Anticipated number of session or this episode of care: unknown      MeasurableTreatment Goal(s) related to diagnosis / functional impairment(s)  Goal 1: Patient will learn cognitive-behavioral and mindfulness-based strategies to tolerate, challenge, and reduce the power and predominance of anxiety-related internal experiences    Objective #A (Patient Action)    Patient will use \"worry time\" each day for 15 minutes of scheduled worry and then defer obsessive or anxious thinking until the next structured worry time.  Status: Continued - Date(s): 4/15/21 "     Intervention(s)  Therapist will assign homework to adhere to structured worry time.    Objective #B  Patient will use cognitive strategies identified in therapy to challenge anxious thoughts.  Status: Continued - Date(s): 4/15/21     Intervention(s)  Therapist will teach decatastrophizing and uncertainty tolerance.    Objective #C  Patient will learn acceptance and defusion strategies to lessen his reactivity to anxious internal experiences.  Status: Continued - Date(s): 4/15/21     Intervention(s)  Therapist will teach defusion and experiential acceptance strategies..      Patient has reviewed and agreed to the above plan      TALHA Alan, [facility] 4/15/21  Treatment plan reviewed and clinical supervision by TALHA Solitario [facility] 4/25/2021

## 2021-04-16 ASSESSMENT — COLUMBIA-SUICIDE SEVERITY RATING SCALE - C-SSRS
TOTAL  NUMBER OF INTERRUPTED ATTEMPTS LIFETIME: NO
3. HAVE YOU BEEN THINKING ABOUT HOW YOU MIGHT KILL YOURSELF?: NO
1. IN THE PAST MONTH, HAVE YOU WISHED YOU WERE DEAD OR WISHED YOU COULD GO TO SLEEP AND NOT WAKE UP?: NO
2. HAVE YOU ACTUALLY HAD ANY THOUGHTS OF KILLING YOURSELF?: NO
TOTAL  NUMBER OF ABORTED OR SELF INTERRUPTED ATTEMPTS PAST 3 MONTHS: NO
ATTEMPT LIFETIME: NO
TOTAL  NUMBER OF ABORTED OR SELF INTERRUPTED ATTEMPTS PAST LIFETIME: NO
4. HAVE YOU HAD THESE THOUGHTS AND HAD SOME INTENTION OF ACTING ON THEM?: NO
4. HAVE YOU HAD THESE THOUGHTS AND HAD SOME INTENTION OF ACTING ON THEM?: NO
TOTAL  NUMBER OF INTERRUPTED ATTEMPTS PAST 3 MONTHS: NO
5. HAVE YOU STARTED TO WORK OUT OR WORKED OUT THE DETAILS OF HOW TO KILL YOURSELF? DO YOU INTEND TO CARRY OUT THIS PLAN?: NO
6. HAVE YOU EVER DONE ANYTHING, STARTED TO DO ANYTHING, OR PREPARED TO DO ANYTHING TO END YOUR LIFE?: NO
ATTEMPT PAST THREE MONTHS: NO
6. HAVE YOU EVER DONE ANYTHING, STARTED TO DO ANYTHING, OR PREPARED TO DO ANYTHING TO END YOUR LIFE?: NO
2. HAVE YOU ACTUALLY HAD ANY THOUGHTS OF KILLING YOURSELF LIFETIME?: NO
1. IN THE PAST MONTH, HAVE YOU WISHED YOU WERE DEAD OR WISHED YOU COULD GO TO SLEEP AND NOT WAKE UP?: NO
5. HAVE YOU STARTED TO WORK OUT OR WORKED OUT THE DETAILS OF HOW TO KILL YOURSELF? DO YOU INTEND TO CARRY OUT THIS PLAN?: NO

## 2021-04-22 ENCOUNTER — CARE COORDINATION (OUTPATIENT)
Dept: CARDIOLOGY | Facility: CLINIC | Age: 61
End: 2021-04-22

## 2021-04-22 NOTE — PROGRESS NOTES
Incoming VM from Bassam requesting to speak to a nurse re: Plavix.    Reviewed chart. Bassam's prox RCA was stented on 4/23/2020. Last visit with Dr. Wayne 3/11/2021. No Plavix plan noted per visit note.     Bassam spoke to my colleague, KRISTINE Fernandez, RN on 3/26/21. Per her note, Bassam would like to continue Plavix until June. 90 day refill was authorized at that time. Of note, Bassam stated he would be out of town for 6 weeks starting in April. He is due to see Janene Fragoso NP in June, 2021.     Called Bassam back to discuss.  Bassam states his trip has been canceled. He states he would like to know what Dr. Wayne thinks about stopping the Plavix now. He states he has not missed any doses.    Advised Bassam that I would talk to Dr. Wayne about this and give him a call back. Bassam was comfortable with this plan and agreed to stay on the Plavix until he hears back from myself or one of my colleagues.    He gave verbal consent for nursing to leave a detailed VM if needed.    Will route to Dr. Wayne for review and advisement.     Carol Ann Leonard, BSN, RN, PHN, HNB-BC   4/22/2021 at 10:44 AM

## 2021-04-22 NOTE — PROGRESS NOTES
Abelino Wayne MD  You 2 minutes ago (10:56 AM)     Sorry, have been so occupied with addressing his ongoing chest pain. In any case, we can continue Plavix if he recently got the refill and stop it when he runs out or in case he notices brusing.      Spoke with Bassam. He notes he has had some very minor bruising, nothing beyond that. We discussed remaining on Plavix until he runs out unless bruising worsens, in which case he should stop the Plavix and call us to let us know. Verbalized understanding.     It was a pleasure speaking with Bassam today.     JOSIAS WeissN, RN, PHN, HNB-BC   4/22/2021 at 11:07 AM

## 2021-04-27 ENCOUNTER — VIRTUAL VISIT (OUTPATIENT)
Dept: PSYCHIATRY | Facility: CLINIC | Age: 61
End: 2021-04-27
Payer: COMMERCIAL

## 2021-04-27 ENCOUNTER — VIRTUAL VISIT (OUTPATIENT)
Dept: PSYCHOLOGY | Facility: CLINIC | Age: 61
End: 2021-04-27
Payer: COMMERCIAL

## 2021-04-27 DIAGNOSIS — F41.1 GAD (GENERALIZED ANXIETY DISORDER): Primary | ICD-10-CM

## 2021-04-27 PROCEDURE — 90832 PSYTX W PT 30 MINUTES: CPT | Mod: 95 | Performed by: PSYCHOLOGIST

## 2021-04-27 PROCEDURE — 99213 OFFICE O/P EST LOW 20 MIN: CPT | Mod: 95 | Performed by: PSYCHIATRY & NEUROLOGY

## 2021-04-27 RX ORDER — CLONAZEPAM 0.5 MG/1
0.5 TABLET ORAL 2 TIMES DAILY PRN
Qty: 20 TABLET | Refills: 0 | Status: SHIPPED | OUTPATIENT
Start: 2021-04-27 | End: 2021-09-02

## 2021-04-27 NOTE — Clinical Note
Please call this patient to get them scheduled for a follow-up visit in 6 weeks. Please schedule with me and the [facility], Dr. Herrera if possible. Thanks!

## 2021-04-27 NOTE — PATIENT INSTRUCTIONS
Treatment Plan:    Continue sertraline 150 mg daily for anxiety.      Continue with clonazepam 0.25-0.5 mg up to 2 times daily as needed for severe anxiety/panic.  Try not to use more than a few times per week.    Recommend ongoing individual psychotherapy for additional support and development of nonpharmacologic coping skills and strategies.    Continue working closely with your primary care provider and cardiologist to monitor any ongoing chest pains and follow their medical recommendations regarding your chest pains.    Continue all other cares per primary care provider.     Continue all other medications as reviewed per electronic medical record today.     Safety plan reviewed. To the Emergency Department as needed or call after hours crisis line at [phone] or [phone]. [place] Crisis Text Line. Text [place] to 168159 or Suicide LifeLine Chat: suicidepreventionRenal Solutionsline.org/chat    Schedule an appointment with me in 6 weeks or sooner as needed. Call [facility] at [phone] to schedule.    Follow up with primary care provider as planned or for acute medical concerns.    Call the psychiatric nurse line with medication questions or concerns at [phone].    OneCardhart may be used to communicate with your provider, but this is not intended to be used for emergencies.    Risks of benzodiazepine (Ativan, Xanax, Klonopin, Valium, etc) use including, but not limited to, sedation, tolerance, risk for addiction/dependence. Do not drink alcohol while taking benzodiazepines due to risk of trouble breathing and potential death. Do not drive or operate heavy machinery until it is known how the drug affects you. Discuss with physician or pharmacist before ever taking a benzodiazepine with a narcotic/opioid pain medication.       Some books recommendations for anxiety:    Get Out of Your Mind & Into Your Life   By Nasir Tran    The Happiness Trap: How to Stop Struggling and Starting Living  By  Duke Almodovar    The Reality Slap: Finding Peace & Fulfillment When Life Hurts  By Duke Almodovar    Things Might Go Terribly, Horribly Wrong: A Guide to Life Liberated from Anxiety  By Ann-Marie Thompson, PhD    Stress Less, Live More: How Acceptance and Commitment Therapy Can Help You Live a Busy Yet Balanced Life  By Daniel Flores    Finding Life Beyond Trauma: Using Acceptance and Commitment Therapy to Heal From Post-Traumatic Stress and Trauma-Related Problems  By Marissa Nance and Tabitha Garcia

## 2021-04-27 NOTE — PROGRESS NOTES
"Amado Veliz is a 61 year old who is being evaluated via a billable telephone visit.      What phone number would you like to be contacted at? See Epic     How would you like to obtain your AVS? Carot          Outpatient Psychiatric Progress Note    Name: Amado Veliz   : 1960                    Primary Care Provider: Ildefonso Lucas MD, MD   Therapist: IRWIN Thakur    PHQ-9 scores:  PHQ-9 SCORE 2020 3/31/2021 4/15/2021   PHQ-9 Total Score MyChart - 1 (Minimal depression) 1 (Minimal depression)   PHQ-9 Total Score 5 - -   Some encounter information is confidential and restricted. Go to Review Flowsheets activity to see all data.       TAJ-7 scores:  TAJ-7 SCORE 3/26/2021 3/31/2021 4/15/2021   Total Score - 6 (mild anxiety) 4 (minimal anxiety)   Total Score 7 - -   Some encounter information is confidential and restricted. Go to Review Flowsheets activity to see all data.       Patient Identification:  Patient is a 61 year old,   White American male  who presents for return visit with me.  Patient is currently retired. Patient attended the phone/video session alone. Patient prefers to be called: \"Bassam\".    Interim History:  I last saw mAado Veliz for outpatient psychiatry return visit on 3/15/21. During that appointment, we:     Continue sertraline 100 mg daily for anxiety.      Continue with clonazepam 0.25-0.5 mg up to 2 times daily as needed for severe anxiety/panic.  Try not to use more than 2-3 times per week.    Recommend individual psychotherapy for additional support and development of nonpharmacologic coping skills and strategies.    : Patient overall reports doing okay.  Did notice some improvement in symptoms with the dose increase of sertraline to 150 mg since last visit.  Wife still feels like he did better on citalopram.  Increased psychosocial stressors recently.  Grieving loss of his mother-in-law.  13-year-old cat has cancer.  He has had some worsening " "constipation.  He still has a bowel movement every day but has had some increased straining.  Feels like he gets plenty of fiber in his diet.  He is weaning off of colchicine.  He will be considering stopping Plavix soon.  Has only used clonazepam 2 times over the past couple of weeks.  Feels some relief just having it on hand to take when needed.  No safety concerns, no SI.  Overall feels like he is managing okay.  Has been learning additional skills and strategies in individual therapy.  No problematic drug or alcohol use.    Per [facility], Dr. Amauri Herrera, during today's team-based visit:  \"I think the increase in zoloft is starting to help\" but he has been having problems with constipation daily. He drinks water all throughout the day and feels he is well hydrated. His mother-in-law  yesterday and is having some health problems that he is trying to manage differently. He has some concerns with how his stent is working for him. He feels that he is handling things fairly well. He has good support with friends and family. He finds that his therapist has been very helpful for him.    Psychiatric ROS:  Amado Veliz reports mood has been: Overall quite stable, see HPI above  Anxiety has been: Overall manageable-improving, see HPI above  Sleep has been: Okay  Marleni sxs: None  Psychosis sxs: None  ADHD/ADD sxs: None  PTSD sxs: None  PHQ9 and GAD7 scores were reviewed today if completed.   Medication side effects: Wonders about constipation  Current stressors include: Symptoms, see HPI above   Coping mechanisms and supports include: Family and Hobbies, therapy    Current medications include:   Current Outpatient Medications   Medication Sig     aspirin (ASA) 81 MG EC tablet Take 1 tablet (81 mg) by mouth daily     atorvastatin (LIPITOR) 20 MG tablet Take 1 tablet (20 mg) by mouth every evening     clonazePAM (KLONOPIN) 0.5 MG tablet Take 1 tablet (0.5 mg) by mouth 2 times daily as needed for anxiety (Use infrequently " for more acute anxiety)     clopidogrel (PLAVIX) 75 MG tablet Take 1 tablet (75 mg) by mouth daily     colchicine (MITIGARE) 0.6 MG capsule Take 1 capsule (0.6 mg) by mouth daily     metoprolol succinate ER (TOPROL-XL) 25 MG 24 hr tablet Take 0.5 tablets (12.5 mg) by mouth daily     multivitamin w/minerals (MULTI-VITAMIN) tablet Take 1 tablet by mouth daily     nitroGLYcerin (NITROSTAT) 0.4 MG sublingual tablet For chest pain place 1 tablet under the tongue every 5 minutes for 3 doses. If symptoms persist 5 minutes after 1st dose call 911.     sertraline (ZOLOFT) 100 MG tablet Take 150 mg by mouth daily     No current facility-administered medications for this visit.        The [place] Prescription Monitoring Program has been reviewed and there are no concerns about diversionary activity for controlled substances at this time.   11/27/2020 1 11/27/2020 11/30/2020 Clonazepam 0.5 Mg Tablet  20.00 10 [facility] [id number] [facility] ([id number]) 0/0 2.00 [place]  07/26/2020 1 07/26/2020 07/26/2020 Diazepam 5 Mg Tablet  6.00 1 [facility] [id number] [facility] ([id number]) 0/0 3.00 [place]  07/01/2020 1 07/01/2020 07/02/2020 Clonazepam 0.5 Mg Tablet  20.00 10 [facility] [id number] [facility] ([id number]) 0/0 2.00 [place]    Past Medical/Surgical History:  Past Medical History:   Diagnosis Date     Anxiety      CAD (coronary artery disease)     Status post stenting of proximal right coronary artery complex ruptured plaque on 23 April 2020.     Hyperlipidemia LDL goal <70      Intestinal infection due to Clostridium difficile      Pharyngoesophageal dysphagia       has a past medical history of Anxiety, CAD (coronary artery disease), Hyperlipidemia LDL goal <70, Intestinal infection due to Clostridium difficile, and Pharyngoesophageal dysphagia. He also has no past medical history of Alcohol dependence (H) or Substance abuse (H).    Social History:  Reviewed. No changes to social history except as noted above in HPI.    Vital Signs:   None. This is  phone/video visit.     Labs:  Most recent laboratory results reviewed and no new labs.     Review of Systems:  10 systems (general, cardiovascular, respiratory, eyes, ENT, endocrine, GI, , M/S, neurological) were reviewed. Most pertinent finding(s) is/are: Chronic, intermittent, atypical chest pains-improving. The remaining systems are all unremarkable.    Mental Status Examination (limited as this is by phone/video):  Appearance: Awake, alert, appears stated age, well-groomed, no acute distress  Attitude:  cooperative, pleasant  Oriented to:  person, place, time, and situation  Attention Span and Concentration:  normal  Speech:  clear, coherent, regular rate, rhythm, and volume  Language: intact  Mood: Okay, a little better  Affect:  appropriate and in normal range and mood congruent  Associations:  no loose associations  Thought Process:  logical, linear and goal oriented  Thought Content:  no evidence of suicidal ideation or homicidal ideation, no evidence of psychotic thought, no auditory hallucinations present and no visual hallucinations present  Recent and Remote Memory:  Intact to interview. Not formally assessed. No amnesia.  Fund of Knowledge: appropriate  Insight:  good  Judgment:  intact, adequate for safety  Impulse Control:  intact    Suicide Risk Assessment:  Today Amado Veliz reports no suicidal ideation. Based on all available evidence including the factors cited above, Amado Veliz does not appear to be at imminent risk for self-harm, does not meet criteria for a 72-hr hold, and therefore remains appropriate for ongoing outpatient level of care.  A thorough assessment of risk factors related to suicide and self-harm have been reviewed and are noted above. The patient convincingly denies suicidality on several occasions. Local community safety resources printed and reviewed for patient to use if needed. There was no deceit detected, and the patient presented in a manner that was  believable.     DSM5 Diagnosis:  300.02 (F41.1) Generalized Anxiety Disorder   R/O Panic Disorder  R/O Illness Anxiety Disorder    Medical comorbidities include:   Patient Active Problem List    Diagnosis Date Noted     Anxiety 09/08/2020     Priority: Medium     Acute reaction to stress 09/03/2020     Priority: Medium     Atypical chest pain 08/05/2020     Priority: Medium     Unstable angina (H) 04/22/2020     Priority: Medium     Pharyngoesophageal dysphagia 04/22/2020     Priority: Medium     HYPERLIPIDEMIA LDL GOAL <160 10/31/2010     Priority: Medium     Mixed hyperlipidemia 06/10/2005     Priority: Medium     Hemorrhage of rectum and anus 10/09/2003     Priority: Medium       Psychosocial & Contextual Factors: See HPI above    Assessment:  Amado Sesaykrishna reports overall some ongoing improvement of anxiety with the dose increase of sertraline to 150 mg daily.  He also has been engaged in regular individual psychotherapy which has been helpful.  He is open to some book recommendations to treat anxiety.  Has had some struggles with constipation.  Could be due to to weaning off of colchicine.  About 6% of people on sertraline will experience constipation.  Recommended adding additional fiber into his diet and/or using over-the-counter MiraLAX to help.  Currently has some ongoing psychosocial stressors and so although he would like to be off all medications, I do not necessarily think this is the best time to decrease or discontinue sertraline.  He agreed.  He will continue individual therapy.  Clonazepam refilled-no concerns regarding clonazepam use.  He uses rarely and only for acute anxiety.    Medication side effects and alternatives were reviewed. Health promotion activities recommended and reviewed today. All questions addressed. Education and counseling completed regarding risks and benefits of medications and psychotherapy options. Recommend therapy for additional support.     Treatment  Plan:    Continue sertraline 150 mg daily for anxiety.      Continue with clonazepam 0.25-0.5 mg up to 2 times daily as needed for severe anxiety/panic.  Try not to use more than a few times per week.    Recommend ongoing individual psychotherapy for additional support and development of nonpharmacologic coping skills and strategies.    Continue working closely with your primary care provider and cardiologist to monitor any ongoing chest pains and follow their medical recommendations regarding your chest pains.    Continue all other cares per primary care provider.     Continue all other medications as reviewed per electronic medical record today.     Safety plan reviewed. To the Emergency Department as needed or call after hours crisis line at [phone] or [phone]. [place] Crisis Text Line. Text [place] to 337409 or Suicide LifeLine Chat: suicidepre[facility].org/chat    Schedule an appointment with me in 6 weeks or sooner as needed. Call [facility] at [phone] to schedule.    Follow up with primary care provider as planned or for acute medical concerns.    Call the psychiatric nurse line with medication questions or concerns at [phone].    [facility] Network[facility] may be used to communicate with your provider, but this is not intended to be used for emergencies.    Risks of benzodiazepine (Ativan, Xanax, Klonopin, Valium, etc) use including, but not limited to, sedation, tolerance, risk for addiction/dependence. Do not drink alcohol while taking benzodiazepines due to risk of trouble breathing and potential death. Do not drive or operate heavy machinery until it is known how the drug affects you. Discuss with physician or pharmacist before ever taking a benzodiazepine with a narcotic/opioid pain medication.     Administrative Billing:   Phone Call/Video Duration: 20 Minutes  Start: 8:49a  Stop: 9:09a    Time spent with patient was 20 minutes and greater than 50% of time or 11 minutes was spent  in counseling and coordination of care regarding above diagnoses and treatment plan.     Patient Status:  Patient will continue to be seen for ongoing consultation and stabilization.    Signed:   Ivon Cornell DO  [facility] Psychiatry    Disclaimer: This note consists of symbols derived from keyboarding, dictation and/or voice recognition software. As a result, there may be errors in the script that have gone undetected. Please consider this when interpreting information found in this chart.

## 2021-04-27 NOTE — PROGRESS NOTES
Collaborative Care Psychiatry Service ([facility])  April 27, 2021      Behavioral Health Clinician Progress Note    Patient Name: Amado Veliz      Telemedicine Visit: The patient's condition can be safely assessed and treated via synchronous audio and visual telemedicine encounter.      Reason for Telemedicine Visit: Services only offered telehealth    Originating Site (Patient Location): Patient's home    Distant Site (Provider Location): Provider Remote Setting    Consent:  The patient/guardian has verbally consented to: the potential risks and benefits of telemedicine (video visit) versus in person care; bill my insurance or make self-payment for services provided; and responsibility for payment of non-covered services.     Mode of Communication:  Video Conference via H?REL    As the provider I attest to compliance with applicable laws and regulations related to telemedicine.         Service Type:  Individual      Session Start Time: 08:15am  Session End Time: 08:35am      Session Length: 16 - 37      Attendees: Patient    Visit Activities (Refresh list every visit): [facility] Only    Diagnostic Assessment Date: 01/28/2021  See Flowsheets for today's PHQ-9 and TAJ-7 results  Previous PHQ-9:   PHQ-9 SCORE 9/8/2020 3/31/2021 4/15/2021   PHQ-9 Total Score MyChart - 1 (Minimal depression) 1 (Minimal depression)   PHQ-9 Total Score 5 - -   Some encounter information is confidential and restricted. Go to Review Flowsheets activity to see all data.       Previous TAJ-7:   TAJ-7 SCORE 3/26/2021 3/31/2021 4/15/2021   Total Score - 6 (mild anxiety) 4 (minimal anxiety)   Total Score 7 - -   Some encounter information is confidential and restricted. Go to Review Flowsheets activity to see all data.       WHODAS  WHODAS 2.0 Total Score 3/31/2021 4/15/2021   Total Score MyChart 13 13   Some encounter information is confidential and restricted. Go to Review Flowsheets activity to see all data.        AUDIT  No flowsheet data  "found.    DATA  Extended Session (60+ minutes): No  Interactive Complexity: No  Crisis: No    Medication Compliance:  Yes      Chemical Use Review:   Substance Use: Chemical use reviewed, no active concerns identified      Tobacco Use: No current tobacco use.      Current Stressors / Issues:  \"I think the increase in zoloft is starting to help\" but he has been having problems with constipation daily. He drinks water all throughout the day and feels he is well hydrated. His mother-in-law  yesterday and is having some health problems that he is trying to manage differently. He has some concerns with how his stent is working for him. He feels that he is handling things fairly well. He has good support with friends and family. He finds that his therapist has been very helpful for him.      Review of Symptoms per patient report as of 2021:  Depression:     No symptoms  Marleni:             No Symptoms  Psychosis:       No Symptoms  Anxiety:           Excessive worry, Nervousness, Physical complaints, such as headaches, stomachaches, muscle tension, Sleep disturbance, Ruminations and Poor concentration  Panic:              No symptoms  Post Traumatic Stress Disorder:  No Symptoms   Eating Disorder:          No Symptoms  ADD / ADHD:              No symptoms  Conduct Disorder:       No symptoms  Autism Spectrum Disorder:     No symptoms  Obsessive Compulsive Disorder:       Cleaning, Symetry and Not current; In the past; mowing lawn had to be in straight lines, fastidious, everything in its place    Changes in Health Issues:   None reported    Assessment: Current Emotional / Mental Status (status of significant symptoms):  Risk status (Self / Other harm or suicidal ideation)  Patient denies a history of suicidal ideation, suicide attempts, self-injurious behavior, homicidal ideation, homicidal behavior and and other safety concerns  Patient denies current fears or concerns for personal safety.  Patient denies " current or recent suicidal ideation or behaviors.  Patient denies current or recent homicidal ideation or behaviors.  Patient denies current or recent self injurious behavior or ideation.  Patient denies other safety concerns.  A safety and risk management plan has not been developed at this time, however patient was encouraged to call [facility] should there be a change in any of these risk factors.    Appearance:   Appropriate   Eye Contact:   Good   Psychomotor Behavior: Normal   Attitude:   Cooperative   Orientation:   All  Speech   Rate / Production: Normal    Volume:  Normal   Mood:    Anxious  Normal  Affect:    Appropriate   Thought Content:  Clear   Thought Form:  Coherent  Logical   Insight:    Fair     Diagnoses:  1. TAJ (generalized anxiety disorder)        Collateral Reports Completed:  Communicated with: Dr. Cornell    Plan: (Homework, other):  Patient was given information about behavioral services and encouraged to schedule a follow up [facility]  He was also given information about mental health symptoms and treatment options .  CD Recommendations: No indications of CD issues.      Amauri Herrera PsyD, LP      Gary Herrera PsyD  April 27, 2021

## 2021-04-30 ENCOUNTER — TELEPHONE (OUTPATIENT)
Dept: PSYCHIATRY | Facility: CLINIC | Age: 61
End: 2021-04-30

## 2021-05-04 NOTE — TELEPHONE ENCOUNTER
PA Initiation    Medication: sertraline (ZOLOFT) 50 MG tablet- INITIATED  Insurance Company: [employer] - Phone [phone] Fax [phone]  Pharmacy Filling the Rx: [facility] #[id number] - [place], [place] - [address] AT [place] [address] & [facility] [address] [address] [facility]  Filling Pharmacy Phone: [phone]  Filling Pharmacy Fax: [phone]  Start Date: 5/4/2021

## 2021-05-04 NOTE — TELEPHONE ENCOUNTER
Prior Authorization Approval    Authorization Effective Date: 5/3/2021  Authorization Expiration Date: 5/3/2022  Medication: sertraline (ZOLOFT) 50 MG tablet- APPROVED  Approved Dose/Quantity: 90 TABS  Reference #: [id number]   Insurance Company: [employer] - Phone [phone] Fax [phone]  Expected CoPay:       CoPay Card Available:      Foundation Assistance Needed:    Which Pharmacy is filling the prescription (Not needed for infusion/clinic administered): [id number] - [facility] [address] AT [place] [address] & [facility] [facility]  Pharmacy Notified: Yes  Patient Notified: Yes            Central Prior Authorization Team  Phone: [phone]

## 2021-05-12 ENCOUNTER — VIRTUAL VISIT (OUTPATIENT)
Dept: PSYCHOLOGY | Facility: CLINIC | Age: 61
End: 2021-05-12
Payer: COMMERCIAL

## 2021-05-12 DIAGNOSIS — F41.1 GAD (GENERALIZED ANXIETY DISORDER): Primary | ICD-10-CM

## 2021-05-12 PROCEDURE — 90832 PSYTX W PT 30 MINUTES: CPT | Mod: 95

## 2021-05-12 ASSESSMENT — ANXIETY QUESTIONNAIRES
GAD7 TOTAL SCORE: 3
4. TROUBLE RELAXING: NOT AT ALL
7. FEELING AFRAID AS IF SOMETHING AWFUL MIGHT HAPPEN: NOT AT ALL
6. BECOMING EASILY ANNOYED OR IRRITABLE: NOT AT ALL
1. FEELING NERVOUS, ANXIOUS, OR ON EDGE: SEVERAL DAYS
3. WORRYING TOO MUCH ABOUT DIFFERENT THINGS: SEVERAL DAYS
GAD7 TOTAL SCORE: 3
GAD7 TOTAL SCORE: 3
7. FEELING AFRAID AS IF SOMETHING AWFUL MIGHT HAPPEN: NOT AT ALL
2. NOT BEING ABLE TO STOP OR CONTROL WORRYING: SEVERAL DAYS
5. BEING SO RESTLESS THAT IT IS HARD TO SIT STILL: NOT AT ALL

## 2021-05-12 ASSESSMENT — PATIENT HEALTH QUESTIONNAIRE - PHQ9
10. IF YOU CHECKED OFF ANY PROBLEMS, HOW DIFFICULT HAVE THESE PROBLEMS MADE IT FOR YOU TO DO YOUR WORK, TAKE CARE OF THINGS AT HOME, OR GET ALONG WITH OTHER PEOPLE: NOT DIFFICULT AT ALL
SUM OF ALL RESPONSES TO PHQ QUESTIONS 1-9: 1
SUM OF ALL RESPONSES TO PHQ QUESTIONS 1-9: 1

## 2021-05-12 NOTE — PROGRESS NOTES
Progress Note    Patient Name: Amado Veliz  Date: 2021         Service Type: Individual      Session Start Time: 9:05 AM  Session End Time: 9:35 AM     Session Length: 16-37    Session #: 3     Attendees: Client attended alone    Service Modality:  Video Visit:      Provider verified identity through the following two step process.  Patient provided:  Patient  and Patient address    Telemedicine Visit: The patient's condition can be safely assessed and treated via synchronous audio and visual telemedicine encounter.      Reason for Telemedicine Visit: Services only offered telehealth    Originating Site (Patient Location): Patient's home    Distant Site (Provider Location): Provider Remote Setting    Consent:  The patient/guardian has verbally consented to: the potential risks and benefits of telemedicine (video visit) versus in person care; bill my insurance or make self-payment for services provided; and responsibility for payment of non-covered services.     Patient would like the video invitation sent by:  My Chart    Mode of Communication:  Video Conference via [employer]    As the provider I attest to compliance with applicable laws and regulations related to telemedicine.     Treatment Plan Last Reviewed: 4/15/2021  PHQ-9 / TAJ-7 :   PHQ 2020 10/23/2020 2021   PHQ-9 Total Score 5 - 1   Q9: Thoughts of better off dead/self-harm past 2 weeks Not at all Not at all Not at all   Some encounter information is confidential and restricted. Go to Review Flowsheets activity to see all data.     TAJ-7 SCORE 3/31/2021 4/15/2021 2021   Total Score 6 (mild anxiety) 4 (minimal anxiety) 3 (minimal anxiety)   Total Score - - 3   Some encounter information is confidential and restricted. Go to Review Flowsheets activity to see all data.       DATA  Interactive Complexity: No  Crisis: No       Progress Since Last Session (Related to Symptoms / Goals /  "Homework):   Symptoms: Improving , minimal anxiety symptoms    Homework: NA      Episode of Care Goals: Satisfactory progress - ACTION (Actively working towards change); Intervened by reinforcing change plan / affirming steps taken     Current / Ongoing Stressors and Concerns:     Cat having medical issues, needed dental surgery and not recovering as well as expected.     Mother-in-law  recently, she was a month short of 94. \"It's probably a blessing that she's in a more comfortable place now\" but patient and his wife are grieving.     Anxiety: \"It'll come on out of the blue and I'll feel it and I'll just get angry\", more frustrated with its presence than anything. \"It's happened a few times when we had an adverse event\". \"I haven't had uncontrollable worries\".     Still trying to get off of the colchicine     Treatment Objective(s) Addressed in This Session:   Use cognitive strategies identified in therapy to challenge anxious thoughts  Learn mindfulness-based strategies for tolerating and reducing the power of anxiety-related thoughts, sensations, experiences  Increase psychological flexibility and tolerance of uncertainty     Intervention:   Discussed recent stressors, validated, expressed empathy and support.   Reinforced progress with anxiety.   ACT: Built experiential acceptance, taught \"unwanted guest\" metaphor.        ASSESSMENT: Current Emotional / Mental Status (status of significant symptoms):   Risk status (Self / Other harm or suicidal ideation)   Patient denies current fears or concerns for personal safety.   Patient denies current or recent suicidal ideation or behaviors.   Patient denies current or recent homicidal ideation or behaviors.   Patient denies current or recent self injurious behavior or ideation.   Patient denies other safety concerns.   Patient reports there has been no change in risk factors since their last session.     Patient reports there has been no change in protective factors " "since their last session.     Recommended that patient call 911 or go to the local ED should there be a change in any of these risk factors.     Appearance:   Appropriate    Eye Contact:   Good    Psychomotor Behavior: Normal    Attitude:   Cooperative    Orientation:   All   Speech    Rate / Production: Normal     Volume:  Normal    Mood:    Anxious  Normal   Affect:    Restricted    Thought Content:  Clear    Thought Form:  Coherent  Goal Directed  Logical  Neurosis   Insight:    Good      Medication Review:   No changes to current psychiatric medication(s)     Medication Compliance:   Yes     Changes in Health Issues:   None reported     Chemical Use Review:   Substance Use: Chemical use reviewed, no active concerns identified      Tobacco Use: No current tobacco use.      Diagnosis:  1. TAJ (generalized anxiety disorder)      Collateral Reports Completed:   Not Applicable    PLAN: (Patient Tasks / Therapist Tasks / Other)  Patient will practice applying the \"unwanted guest\" metaphor to emotional and physical expressions of anxiety.    Patient and therapist will review progress and consider discharge at next appointment.      TALHA Alan, [facility] 5/12/2021  Service Performed and Documented by [facility]-   Note reviewed and clinical supervision by TALHA Solitario [facility] 5/16/2021                                                       ______________________________________________________________________    Treatment Plan    Patient's Name: Amado Veliz  YOB: 1960    Date: 4/15/21    DSM5 Diagnoses: 300.02 (F41.1) Generalized Anxiety Disorder  Psychosocial / Contextual Factors: heart health concerns  WHODAS:   WHODAS 2.0 Total Score 3/31/2021 4/15/2021   Total Score MyChart 13 13   Some encounter information is confidential and restricted. Go to Review Flowsheets activity to see all data.     Referral / Collaboration:  Referral to another professional/service is not indicated at this " "time..    Anticipated number of session or this episode of care: unknown      MeasurableTreatment Goal(s) related to diagnosis / functional impairment(s)  Goal 1: Patient will learn cognitive-behavioral and mindfulness-based strategies to tolerate, challenge, and reduce the power and predominance of anxiety-related internal experiences    Objective #A (Patient Action)    Patient will use \"worry time\" each day for 15 minutes of scheduled worry and then defer obsessive or anxious thinking until the next structured worry time.  Status: Continued - Date(s): 4/15/21     Intervention(s)  Therapist will assign homework to adhere to structured worry time.    Objective #B  Patient will use cognitive strategies identified in therapy to challenge anxious thoughts.  Status: Continued - Date(s): 4/15/21     Intervention(s)  Therapist will teach decatastrophizing and uncertainty tolerance.    Objective #C  Patient will learn acceptance and defusion strategies to lessen his reactivity to anxious internal experiences.  Status: Continued - Date(s): 4/15/21     Intervention(s)  Therapist will teach defusion and experiential acceptance strategies..      Patient has reviewed and agreed to the above plan      TALHA Alan, [facility] 4/15/21  Treatment plan reviewed and clinical supervision by TALHA Solitario [facility] 4/25/2021  "

## 2021-05-13 ASSESSMENT — PATIENT HEALTH QUESTIONNAIRE - PHQ9: SUM OF ALL RESPONSES TO PHQ QUESTIONS 1-9: 1

## 2021-05-13 ASSESSMENT — ANXIETY QUESTIONNAIRES: GAD7 TOTAL SCORE: 3

## 2021-05-26 ENCOUNTER — VIRTUAL VISIT (OUTPATIENT)
Dept: PSYCHOLOGY | Facility: CLINIC | Age: 61
End: 2021-05-26
Payer: COMMERCIAL

## 2021-05-26 DIAGNOSIS — F41.1 GAD (GENERALIZED ANXIETY DISORDER): Primary | ICD-10-CM

## 2021-05-26 PROCEDURE — 90832 PSYTX W PT 30 MINUTES: CPT | Mod: 95

## 2021-05-26 ASSESSMENT — ANXIETY QUESTIONNAIRES
4. TROUBLE RELAXING: NOT AT ALL
1. FEELING NERVOUS, ANXIOUS, OR ON EDGE: SEVERAL DAYS
GAD7 TOTAL SCORE: 3
7. FEELING AFRAID AS IF SOMETHING AWFUL MIGHT HAPPEN: NOT AT ALL
7. FEELING AFRAID AS IF SOMETHING AWFUL MIGHT HAPPEN: NOT AT ALL
5. BEING SO RESTLESS THAT IT IS HARD TO SIT STILL: NOT AT ALL
6. BECOMING EASILY ANNOYED OR IRRITABLE: NOT AT ALL
GAD7 TOTAL SCORE: 3
3. WORRYING TOO MUCH ABOUT DIFFERENT THINGS: SEVERAL DAYS
GAD7 TOTAL SCORE: 3
2. NOT BEING ABLE TO STOP OR CONTROL WORRYING: SEVERAL DAYS

## 2021-05-26 ASSESSMENT — PATIENT HEALTH QUESTIONNAIRE - PHQ9
SUM OF ALL RESPONSES TO PHQ QUESTIONS 1-9: 1
SUM OF ALL RESPONSES TO PHQ QUESTIONS 1-9: 1
10. IF YOU CHECKED OFF ANY PROBLEMS, HOW DIFFICULT HAVE THESE PROBLEMS MADE IT FOR YOU TO DO YOUR WORK, TAKE CARE OF THINGS AT HOME, OR GET ALONG WITH OTHER PEOPLE: NOT DIFFICULT AT ALL

## 2021-05-26 NOTE — PROGRESS NOTES
Progress Note    Patient Name: mAado Veliz  Date: 2021         Service Type: Individual      Session Start Time: 9:05 AM  Session End Time: 9:35 AM     Session Length: 16-37    Session #: 4     Attendees: Client attended alone    Service Modality:  Video Visit:      Provider verified identity through the following two step process.  Patient provided:  Patient  and Patient address    Telemedicine Visit: The patient's condition can be safely assessed and treated via synchronous audio and visual telemedicine encounter.      Reason for Telemedicine Visit: Services only offered telehealth    Originating Site (Patient Location): Patient's home    Distant Site (Provider Location): Provider Remote Setting    Consent:  The patient/guardian has verbally consented to: the potential risks and benefits of telemedicine (video visit) versus in person care; bill my insurance or make self-payment for services provided; and responsibility for payment of non-covered services.     Patient would like the video invitation sent by:  My Chart    Mode of Communication:  Video Conference via Amwell    As the provider I attest to compliance with applicable laws and regulations related to telemedicine.     Treatment Plan Last Reviewed: 4/15/2021  PHQ-9 / TAJ-7 :   PHQ 10/23/2020 2021 2021   PHQ-9 Total Score - 1 1   Q9: Thoughts of better off dead/self-harm past 2 weeks Not at all Not at all Not at all   Some encounter information is confidential and restricted. Go to Review Flowsheets activity to see all data.     TAJ-7 SCORE 4/15/2021 2021 2021   Total Score 4 (minimal anxiety) 3 (minimal anxiety) 3 (minimal anxiety)   Total Score - 3 3   Some encounter information is confidential and restricted. Go to Review Flowsheets activity to see all data.       DATA  Interactive Complexity: No  Crisis: No       Progress Since Last Session (Related to Symptoms / Goals /  "Homework):   Symptoms: No change , minimal anxiety symptoms but \"I want to get rid of them\"    Homework: NA      Episode of Care Goals: Satisfactory progress - ACTION (Actively working towards change); Intervened by reinforcing change plan / affirming steps taken     Current / Ongoing Stressors and Concerns:     Cat is recovering.     Patient and his wife now dealing with mother-in-law's estate. Both are grieving.     Anxiety: \"It'll come on out of the blue and I'll feel it and I'll just get angry\", more frustrated with its presence than anything. \"It's happened a few times when we had an adverse event\". \"I haven't had uncontrollable worries\". Anxiety is no longer connected to symptoms of chest pain, \"I've learned to ignore that\".     Patient would like to try discontinuing therapy for now and check in again if necessary in a month or so     Treatment Objective(s) Addressed in This Session:   Use cognitive strategies identified in therapy to challenge anxious thoughts  Learn mindfulness-based strategies for tolerating and reducing the power of anxiety-related thoughts, sensations, experiences  Increase psychological flexibility and tolerance of uncertainty     Intervention:   Reinforced progress with anxiety.   Psychoeducated on anxiety and its relationship with experiential avoidance and resistance of internal experiences   ACT: Built experiential acceptance   Discussed overall progress in therapy        ASSESSMENT: Current Emotional / Mental Status (status of significant symptoms):   Risk status (Self / Other harm or suicidal ideation)   Patient denies current fears or concerns for personal safety.   Patient denies current or recent suicidal ideation or behaviors.   Patient denies current or recent homicidal ideation or behaviors.   Patient denies current or recent self injurious behavior or ideation.   Patient denies other safety concerns.   Patient reports there has been no change in risk factors since their " last session.     Patient reports there has been no change in protective factors since their last session.     Recommended that patient call 911 or go to the local ED should there be a change in any of these risk factors.     Appearance:   Appropriate    Eye Contact:   Good    Psychomotor Behavior: Normal    Attitude:   Cooperative    Orientation:   All   Speech    Rate / Production: Normal     Volume:  Normal    Mood:    Anxious  Normal   Affect:    Restricted    Thought Content:  Clear    Thought Form:  Coherent  Goal Directed  Logical  Neurosis   Insight:    Good      Medication Review:   No changes to current psychiatric medication(s)     Medication Compliance:   Yes     Changes in Health Issues:   None reported     Chemical Use Review:   Substance Use: Chemical use reviewed, no active concerns identified      Tobacco Use: No current tobacco use.      Diagnosis:  1. TAJ (generalized anxiety disorder)      Collateral Reports Completed:   Not Applicable    PLAN: (Patient Tasks / Therapist Tasks / Other)  Patient will call to schedule a follow-up appointment in a month or so if he decides it's necessary.      TALHA Alan, [facility] 5/26/2021  Service Performed and Documented by [facility]-   Note reviewed and clinical supervision by TALHA Solitario [facility] 5/27/2021                                                        ______________________________________________________________________    Treatment Plan    Patient's Name: Amado Veliz  YOB: 1960    Date: 4/15/21    DSM5 Diagnoses: 300.02 (F41.1) Generalized Anxiety Disorder  Psychosocial / Contextual Factors: heart health concerns  WHODAS:   WHODAS 2.0 Total Score 3/31/2021 4/15/2021   Total Score MyChart 13 13   Some encounter information is confidential and restricted. Go to Review Flowsheets activity to see all data.     Referral / Collaboration:  Referral to another professional/service is not indicated at this time..    Anticipated  "number of session or this episode of care: unknown      MeasurableTreatment Goal(s) related to diagnosis / functional impairment(s)  Goal 1: Patient will learn cognitive-behavioral and mindfulness-based strategies to tolerate, challenge, and reduce the power and predominance of anxiety-related internal experiences    Objective #A (Patient Action)    Patient will use \"worry time\" each day for 15 minutes of scheduled worry and then defer obsessive or anxious thinking until the next structured worry time.  Status: Continued - Date(s): 4/15/21     Intervention(s)  Therapist will assign homework to adhere to structured worry time.    Objective #B  Patient will use cognitive strategies identified in therapy to challenge anxious thoughts.  Status: Continued - Date(s): 4/15/21     Intervention(s)  Therapist will teach decatastrophizing and uncertainty tolerance.    Objective #C  Patient will learn acceptance and defusion strategies to lessen his reactivity to anxious internal experiences.  Status: Continued - Date(s): 4/15/21     Intervention(s)  Therapist will teach defusion and experiential acceptance strategies..      Patient has reviewed and agreed to the above plan      TALHA Alan, [facility] 4/15/21  Treatment plan reviewed and clinical supervision by TALHA Solitario [facility] 4/25/2021  "

## 2021-05-27 ASSESSMENT — ANXIETY QUESTIONNAIRES: GAD7 TOTAL SCORE: 3

## 2021-05-27 ASSESSMENT — PATIENT HEALTH QUESTIONNAIRE - PHQ9: SUM OF ALL RESPONSES TO PHQ QUESTIONS 1-9: 1

## 2021-06-11 NOTE — PROGRESS NOTES
[facility] Service ([facility])  June 14, 2021      Behavioral Health Clinician Progress Note    Patient Name: Amado Veliz      Telemedicine Visit: The patient's condition can be safely assessed and treated via synchronous audio and visual telemedicine encounter.      Reason for Telemedicine Visit: Services only offered telehealth    Originating Site (Patient Location): Patient's home    Distant Site (Provider Location): Provider Remote Setting    Consent:  The patient/guardian has verbally consented to: the potential risks and benefits of telemedicine (video visit) versus in person care; bill my insurance or make self-payment for services provided; and responsibility for payment of non-covered services.     Mode of Communication:  Video Conference via [employer]    As the provider I attest to compliance with applicable laws and regulations related to telemedicine.         Service Type:  Individual      Session Start Time: 08:15am  Session End Time: 08:32am      Session Length: 16 - 37      Attendees: Patient    Visit Activities (Refresh list every visit): [facility] Only    Diagnostic Assessment Date: 01/28/2021  See Flowsheets for today's PHQ-9 and TAJ-7 results  Previous PHQ-9:   PHQ-9 SCORE 4/15/2021 5/12/2021 5/26/2021   PHQ-9 Total Score MyChart 1 (Minimal depression) 1 (Minimal depression) 1 (Minimal depression)   PHQ-9 Total Score - 1 1   Some encounter information is confidential and restricted. Go to Review Flowsheets activity to see all data.       Previous TAJ-7:   TAJ-7 SCORE 4/15/2021 5/12/2021 5/26/2021   Total Score 4 (minimal anxiety) 3 (minimal anxiety) 3 (minimal anxiety)   Total Score - 3 3   Some encounter information is confidential and restricted. Go to Review Flowsheets activity to see all data.       WHODAS  WHODAS 2.0 Total Score 3/31/2021 4/15/2021   Total Score MyChart 13 13   Some encounter information is confidential and restricted. Go to Review Flowsheets activity to see  all data.        AUDIT  No flowsheet data found.    DATA  Extended Session (60+ minutes): No  Interactive Complexity: No  Crisis: No    Medication Compliance:  Yes      Chemical Use Review:   Substance Use: Chemical use reviewed, no active concerns identified      Tobacco Use: No current tobacco use.      Current Stressors / Issues:  Decrease in chest pain since starting sertaline, decreased anxiety. Hasn't used klonopin in the last month. Was seeing therapist but is going PRN for now. Most useful skills: acceptance, mindfulness, relaxation techniques, reading some books on anxiety and how to manage. Relieved that anxiety isn't interfering with heart disease, hx of stent and infection. Decreased stress around pandemic since getting vaccinated. Optimistic that he can continue using what he has learned to improve managing his anxiety. Explored grief around mother in laws death 6 weeks ago. Feels family is doing well dealing with loss.     Review of Symptoms per patient report as of 01/28/2021:  Depression:     No symptoms  Marleni:             No Symptoms  Psychosis:       No Symptoms  Anxiety:           Excessive worry, Nervousness, Physical complaints, such as headaches, stomachaches, muscle tension, Sleep disturbance, Ruminations and Poor concentration  Panic:              No symptoms  Post Traumatic Stress Disorder:  No Symptoms   Eating Disorder:          No Symptoms  ADD / ADHD:              No symptoms  Conduct Disorder:       No symptoms  Autism Spectrum Disorder:     No symptoms  Obsessive Compulsive Disorder:       Cleaning, Symetry and Not current; In the past; mowing lawn had to be in straight lines, fastidious, everything in its place    Changes in Health Issues:   None reported    Assessment: Current Emotional / Mental Status (status of significant symptoms):  Risk status (Self / Other harm or suicidal ideation)  Patient denies a history of suicidal ideation, suicide attempts, self-injurious behavior,  homicidal ideation, homicidal behavior and and other safety concerns  Patient denies current fears or concerns for personal safety.  Patient denies current or recent suicidal ideation or behaviors.  Patient denies current or recent homicidal ideation or behaviors.  Patient denies current or recent self injurious behavior or ideation.  Patient denies other safety concerns.  A safety and risk management plan has not been developed at this time, however patient was encouraged to call [place] [phone] should there be a change in any of these risk factors.    Appearance:   Appropriate   Eye Contact:   Good   Psychomotor Behavior: Normal   Attitude:   Cooperative   Orientation:   All  Speech   Rate / Production: Normal    Volume:  Normal   Mood:    Anxious  Normal  Affect:    Appropriate   Thought Content:  Clear   Thought Form:  Coherent  Logical   Insight:    Fair     Diagnoses:  1. Generalized anxiety disorder        Collateral Reports Completed:  Communicated with: Dr. Cornell    Plan: (Homework, other):  Patient was given information about behavioral services and encouraged to schedule a follow up appointment with the clinic [facility]  He was also given information about mental health symptoms and treatment options .  CD Recommendations: No indications of CD issues.            Bev Lorenzo, [facility]ERT[facility]  June 14, 2021

## 2021-06-14 ENCOUNTER — VIRTUAL VISIT (OUTPATIENT)
Dept: PSYCHIATRY | Facility: CLINIC | Age: 61
End: 2021-06-14
Payer: COMMERCIAL

## 2021-06-14 ENCOUNTER — VIRTUAL VISIT (OUTPATIENT)
Dept: BEHAVIORAL HEALTH | Facility: CLINIC | Age: 61
End: 2021-06-14
Payer: COMMERCIAL

## 2021-06-14 DIAGNOSIS — F41.1 GAD (GENERALIZED ANXIETY DISORDER): Primary | ICD-10-CM

## 2021-06-14 DIAGNOSIS — F41.1 GENERALIZED ANXIETY DISORDER: Primary | ICD-10-CM

## 2021-06-14 PROCEDURE — 90832 PSYTX W PT 30 MINUTES: CPT | Mod: 95 | Performed by: SOCIAL WORKER

## 2021-06-14 PROCEDURE — 99213 OFFICE O/P EST LOW 20 MIN: CPT | Mod: 95 | Performed by: PSYCHIATRY & NEUROLOGY

## 2021-06-14 NOTE — PROGRESS NOTES
"Amado Veliz is a 61 year old year old who is being evaluated via a billable video visit.      How would you like to obtain your AVS? MyChart  If you are dropped from the video visit, the video invite should be resent to: Text to cell phone: see Epic  Will anyone else be joining your video visit? No       Telemedicine Visit: The patient's condition can be safely assessed and treated via synchronous audio and visual telemedicine encounter.      Reason for Telemedicine Visit: Covid-19 Pandemic    Originating Site (Patient Location): Patient's home     Distant Site (Provider Location): Provider Remote Setting    Mode of Communication:  Video Conference via Roomixer    As the provider I attest to compliance with applicable laws and regulations related to telemedicine.          Outpatient Psychiatric Progress Note    Name: Amado Veliz   : 1960                    Primary Care Provider: Ildefonso Lucas MD, MD   Therapist: IRWIN Thakur (sees PRN now)    PHQ-9 scores:  PHQ-9 SCORE 4/15/2021 2021 2021   PHQ-9 Total Score MyChart 1 (Minimal depression) 1 (Minimal depression) 1 (Minimal depression)   PHQ-9 Total Score - 1 1   Some encounter information is confidential and restricted. Go to Review Flowsheets activity to see all data.       TAJ-7 scores:  TAJ-7 SCORE 4/15/2021 2021 2021   Total Score 4 (minimal anxiety) 3 (minimal anxiety) 3 (minimal anxiety)   Total Score - 3 3   Some encounter information is confidential and restricted. Go to Review Flowsheets activity to see all data.       Patient Identification:  Patient is a 61 year old,   White Not  or  male  who presents for return visit with me.  Patient is currently retired. Patient attended the phone/video session alone. Patient prefers to be called: \"Bassam\".    Interim History:  I last saw Amado Veliz for outpatient psychiatry return visit on 21. During that appointment, we:     Continue " sertraline 150 mg daily for anxiety.      Continue with clonazepam 0.25-0.5 mg up to 2 times daily as needed for severe anxiety/panic.  Try not to use more than a few times per week.    Recommend ongoing individual psychotherapy for additional support and development of nonpharmacologic coping skills and strategies.    Continue working closely with your primary care provider and cardiologist to monitor any ongoing chest pains and follow their medical recommendations regarding your chest pains.    Zoloft increased to to 200 mg daily since last visit via [employer]maker.     6/14: Pt reports that he overall is feeling quite a bit better.  No chest pain for at least the past week.  He feels increasing his sertraline to 200 mg daily has been quite helpful thus far.  Anxiety has continued to improve over the past several months.  He attributes this likely to medication, therapy, and improved psychosocial circumstances.  He is quite pleased with his progress.  He is seeing his therapist on an as-needed basis now due to his improvement.  His medical conditions have been under relatively good/stable control.  Mood stable.  Very rare use of clonazepam.  No SI.  No problematic drug or alcohol use.    Per [facility], Bev Lorenzo, [facility], during today's team-based visit:  Decrease in chest pain since starting sertaline, decreased anxiety. Hasn't used klonopin in the last month. Was seeing therapist but is going PRN for now. Most useful skills: acceptance, mindfulness, relaxation techniques, reading some books on anxiety and how to manage. Relieved that anxiety isn't interfering with heart disease, hx of stent and infection. Decreased stress around pandemic since getting vaccinated. Optimistic that he can continue using what he has learned to improve managing his anxiety. Explored grief around mother in laws death 6 weeks ago. Feels family is doing well dealing with loss.     Psychiatric ROS:  Amado Veliz reports mood has been:  Overall quite stable, see HPI above  Anxiety has been: Improved  Sleep has been: Okay  Marleni sxs: None  Psychosis sxs: None  ADHD/ADD sxs: None  PTSD sxs: None  PHQ9 and GAD7 scores were reviewed today if completed.   Medication side effects: None  Current stressors include: Symptoms, see HPI above   Coping mechanisms and supports include: Family and Hobbies, therapy    Current medications include:   Current Outpatient Medications   Medication Sig     aspirin (ASA) 81 MG EC tablet Take 1 tablet (81 mg) by mouth daily     atorvastatin (LIPITOR) 20 MG tablet Take 1 tablet (20 mg) by mouth every evening     clonazePAM (KLONOPIN) 0.5 MG tablet Take 1 tablet (0.5 mg) by mouth 2 times daily as needed for anxiety (Use infrequently for more acute anxiety)     clopidogrel (PLAVIX) 75 MG tablet Take 1 tablet (75 mg) by mouth daily     colchicine (MITIGARE) 0.6 MG capsule Take 1 capsule (0.6 mg) by mouth daily     metoprolol succinate ER (TOPROL-XL) 25 MG 24 hr tablet Take 0.5 tablets (12.5 mg) by mouth daily     multivitamin w/minerals (MULTI-VITAMIN) tablet Take 1 tablet by mouth daily     nitroGLYcerin (NITROSTAT) 0.4 MG sublingual tablet For chest pain place 1 tablet under the tongue every 5 minutes for 3 doses. If symptoms persist 5 minutes after 1st dose call 911.     sertraline (ZOLOFT) 100 MG tablet Take 2 tablets (200 mg) by mouth daily     No current facility-administered medications for this visit.        The [place] Prescription Monitoring Program has been reviewed and there are no concerns about diversionary activity for controlled substances at this time.   04/27/2021 1 04/27/2021 05/03/2021 Clonazepam 0.5 Mg Tablet  20.00 10 [facility] [id number] [facility] ([id number]) 0/0 2.00 [place]  11/27/2020 1 11/27/2020 11/30/2020 Clonazepam 0.5 Mg Tablet  20.00 10 [facility] [id number] [facility] ([id number]) 0/0 2.00 [place]  07/26/2020 1 07/26/2020 07/26/2020 Diazepam 5 Mg Tablet  6.00 1 [facility] [id number] [facility] ([id number]) 0/0 3.00 [place]  07/01/2020 1 07/01/2020 07/02/2020 Clonazepam 0.5 Mg Tablet  20.00 10 [facility] [id number] Wal ([id number]) 0/0 2.00 [place]    Past Medical/Surgical History:  Past Medical History:   Diagnosis Date     Anxiety      CAD (coronary artery disease)     Status post stenting of proximal right coronary artery complex ruptured plaque on 23 April 2020.     Hyperlipidemia LDL goal <70      Intestinal infection due to Clostridium difficile      Pharyngoesophageal dysphagia       has a past medical history of Anxiety, CAD (coronary artery disease), Hyperlipidemia LDL goal <70, Intestinal infection due to Clostridium difficile, and Pharyngoesophageal dysphagia. He also has no past medical history of Alcohol dependence (H) or Substance abuse (H).    Social History:  Reviewed. No changes to social history except as noted above in HPI.    Vital Signs:   None. This is phone/video visit.     Labs:  Most recent laboratory results reviewed and no new labs.     Review of Systems:  10 systems (general, cardiovascular, respiratory, eyes, ENT, endocrine, GI, , M/S, neurological) were reviewed. Most pertinent finding(s) is/are: Chronic, intermittent, atypical chest pains-much improved. The remaining systems are all unremarkable.    Mental Status Examination (limited as this is by phone/video):  Appearance: Awake, alert, appears stated age, well-groomed, no acute distress  Attitude:  cooperative, pleasant  Motor: No gross abnormalities observed via video, not formally tested  Oriented to:  person, place, time, and situation  Attention Span and Concentration:  normal  Speech:  clear, coherent, regular rate, rhythm, and volume  Language: intact  Mood: Pretty good  Affect:  appropriate and in normal range and mood congruent  Associations:  no loose associations  Thought Process:  logical, linear and goal oriented  Thought Content:  no evidence of suicidal ideation or homicidal ideation, no evidence of psychotic thought, no auditory  hallucinations present and no visual hallucinations present  Recent and Remote Memory:  Intact to interview. Not formally assessed. No amnesia.  Fund of Knowledge: appropriate  Insight:  good  Judgment:  intact, adequate for safety  Impulse Control:  intact    Suicide Risk Assessment:  Today Amado Veliz reports no suicidal ideation. Based on all available evidence including the factors cited above, Amado Veliz does not appear to be at imminent risk for self-harm, does not meet criteria for a 72-hr hold, and therefore remains appropriate for ongoing outpatient level of care.  A thorough assessment of risk factors related to suicide and self-harm have been reviewed and are noted above. The patient convincingly denies suicidality on several occasions. Local community safety resources printed and reviewed for patient to use if needed. There was no deceit detected, and the patient presented in a manner that was believable.     DSM5 Diagnosis:  300.02 (F41.1) Generalized Anxiety Disorder   R/O Panic Disorder  R/O Illness Anxiety Disorder    Medical comorbidities include:   Patient Active Problem List    Diagnosis Date Noted     Anxiety 09/08/2020     Priority: Medium     Acute reaction to stress 09/03/2020     Priority: Medium     Atypical chest pain 08/05/2020     Priority: Medium     Unstable angina (H) 04/22/2020     Priority: Medium     Pharyngoesophageal dysphagia 04/22/2020     Priority: Medium     HYPERLIPIDEMIA LDL GOAL <160 10/31/2010     Priority: Medium     Mixed hyperlipidemia 06/10/2005     Priority: Medium     Hemorrhage of rectum and anus 10/09/2003     Priority: Medium       Psychosocial & Contextual Factors: See HPI above    Assessment:  Amaod Veliz reports overall good improvement of his anxiety symptoms.  Chest pain episodes far fewer.  Increasing the dose of sertraline to 200 mg daily has been quite helpful.  He did notice further improvement with the dose increase.  Does not endorse  any negative side effects.  Things have overall been more stable with continued improvement since starting sertraline.  Now at optimal dose his care will be returned to his primary care provider for ongoing management.  He very rarely uses clonazepam.  20 tablets last prescribed 4/27/2021.  I have no concerns regarding his use of benzodiazepines.  Patient inquired about how long he should stay on sertraline.  He was encouraged to remain on sertraline 200 mg daily for at least 12 months from this time before considering a taper.  He is encouraged only to consider a taper if his symptoms remain under good control, he is continuing to utilize appropriate nonpharmacologic coping skills and strategies, his medical conditions are under good control, and psychosocial circumstances are manageable.  Even then, I would recommend a pretty slow taper, such as decreasing his dose by 50 mg/month so there is ample time to watch for worsening symptoms without losing much ground if the need arises to go back up on the dose.  Due to his ongoing stability, his care will be returned to his primary care provider for ongoing management at this time.    Medication side effects and alternatives were reviewed. Health promotion activities recommended and reviewed today. All questions addressed. Education and counseling completed regarding risks and benefits of medications and psychotherapy options. Recommend therapy for additional support.     Treatment Plan:    Continue sertraline 200 mg daily for anxiety.      Continue with clonazepam 0.25-0.5 mg up to 2 times daily as needed for severe anxiety/panic.    Continue to use sparingly.    Recommend ongoing individual psychotherapy as needed for additional support and development of nonpharmacologic coping skills and strategies.    Continue working closely with your primary care provider and cardiologist to monitor any ongoing chest pains and follow their medical recommendations regarding your  chest pains.    Continue all other cares per primary care provider.     Continue all other medications as reviewed per electronic medical record today.     Safety plan reviewed. To the Emergency Department as needed or call after hours crisis line at [phone] or [phone]. [place] Crisis Text Line. Text [place] to 460772 or Suicide LifeLine Chat: suicidepreventionlifeline.org/chat.    Follow up with primary care provider in about 6-8 weeks for mental health follow-up visit, as planned, or for acute medical concerns.    LocaModahart may be used to communicate with your provider, but this is not intended to be used for emergencies.    Thank you for our work together in the Psychiatry Collaborative Care Model at [facility]. This is our last visit and I am returning your care back to your Primary Care Provider Ildefonso Lucas MD, MD . If you are not doing well, please contact your Primary Care Provider office.     Risks of benzodiazepine (Ativan, Xanax, Klonopin, Valium, etc) use including, but not limited to, sedation, tolerance, risk for addiction/dependence. Do not drink alcohol while taking benzodiazepines due to risk of trouble breathing and potential death. Do not drive or operate heavy machinery until it is known how the drug affects you. Discuss with physician or pharmacist before ever taking a benzodiazepine with a narcotic/opioid pain medication.     Administrative Billing:   Phone Call/Video Duration: 11 Minutes  Start: 8:46a  Stop: 9:57a    Time spent with patient was 11 minutes and greater than 50% of time or 6 minutes was spent in counseling and coordination of care regarding above diagnoses and treatment plan.     Patient Status:  The patient is being returned to the referring provider for ongoing care and medication prescribing.  The patient can be re-referred back to this service for further consultation if needed in the future.    Signed:   Ivon Cornell DO  [facility]    Disclaimer:  This note consists of symbols derived from keyboarding, dictation and/or voice recognition software. As a result, there may be errors in the script that have gone undetected. Please consider this when interpreting information found in this chart.

## 2021-06-14 NOTE — Clinical Note
Psychiatry Update/Consult. I am returning Bassam's psychiatric care back to you for ongoing care and medication prescribing due to his ongoing stability.  Future medication refills will come from you. I recommended that the patient follow up with you in about 6-8 weeks, or sooner as needed for mental health visit. More details about treatment plan are in my note. If you have any questions or concerns, please let me know. The patient can be re-referred back to this service for further consultation in the future if needed but a new referral will need to be placed.    Thanks for the referral! It was a pleasure working with Amado LEIDY Phiilppekrishna.     - Ivon Cornell, DO  [facility]

## 2021-06-14 NOTE — PATIENT INSTRUCTIONS
Treatment Plan:    Continue sertraline 200 mg daily for anxiety.      Continue with clonazepam 0.25-0.5 mg up to 2 times daily as needed for severe anxiety/panic.    Continue to use sparingly.    Recommend ongoing individual psychotherapy as needed for additional support and development of nonpharmacologic coping skills and strategies.    Continue working closely with your primary care provider and cardiologist to monitor any ongoing chest pains and follow their medical recommendations regarding your chest pains.    Continue all other cares per primary care provider.     Continue all other medications as reviewed per electronic medical record today.     Safety plan reviewed. To the Emergency Department as needed or call after hours crisis line at [phone] or [phone]. [place] Crisis Text Line. Text [place] to 039176 or Suicide LifeLine Chat: suicidepreventionlifeline.org/chat    Follow up with primary care provider in about 6-8 weeks for mental health follow-up visit, as planned, or for acute medical concerns.    MyChart may be used to communicate with your provider, but this is not intended to be used for emergencies.    Thank you for our work together in the Psychiatry Collaborative Care Model at [facility]. This is our last visit and I am returning your care back to your Primary Care Provider Ildefonso Lucas MD, MD . If you are not doing well, please contact your Primary Care Provider office.     Risks of benzodiazepine (Ativan, Xanax, Klonopin, Valium, etc) use including, but not limited to, sedation, tolerance, risk for addiction/dependence. Do not drink alcohol while taking benzodiazepines due to risk of trouble breathing and potential death. Do not drive or operate heavy machinery until it is known how the drug affects you. Discuss with physician or pharmacist before ever taking a benzodiazepine with a narcotic/opioid pain medication.

## 2021-07-09 ENCOUNTER — FCC EXTENDED DOCUMENTATION (OUTPATIENT)
Dept: PSYCHOLOGY | Facility: CLINIC | Age: 61
End: 2021-07-09

## 2021-07-09 NOTE — PROGRESS NOTES
Discharge Summary  Multiple Sessions    Client Name: Amado Veliz MRN#: [id number] YOB: 1960      Intake / Discharge Date: Intake: 4/1/2021, Discharge: 7/9/2021      DSM5 Diagnoses: (Sustained by DSM5 Criteria Listed Above)  Diagnoses: 300.02 (F41.1) Generalized Anxiety Disorder  Psychosocial & Contextual Factors: recent medical issues, chest pain  WHODAS 2.0 (12 item) Score:   WHODAS 2.0 Total Score 3/31/2021 4/15/2021   Total Score MyChart 13 13   Some encounter information is confidential and restricted. Go to Review Flowsheets activity to see all data.             Presenting Concern:  Health anxiety      Reason for Discharge:  Client is satisfied with progress      Disposition at Time of Last Encounter:   Comments:   NA     Risk Management:   Client denies a history of suicidal ideation, suicide attempts, self-injurious behavior, homicidal ideation, homicidal behavior and and other safety concerns  Recommended that patient call 911 or go to the local ED should there be a change in any of these risk factors.      Referred To:  TALHA Santiago, LGSW 7/9/2021

## 2021-08-12 ENCOUNTER — TRANSFERRED RECORDS (OUTPATIENT)
Dept: HEALTH INFORMATION MANAGEMENT | Facility: CLINIC | Age: 61
End: 2021-08-12

## 2021-08-16 ENCOUNTER — OFFICE VISIT (OUTPATIENT)
Dept: DERMATOLOGY | Facility: CLINIC | Age: 61
End: 2021-08-16
Payer: COMMERCIAL

## 2021-08-16 VITALS — DIASTOLIC BLOOD PRESSURE: 81 MMHG | SYSTOLIC BLOOD PRESSURE: 133 MMHG

## 2021-08-16 DIAGNOSIS — D18.01 CHERRY ANGIOMA: ICD-10-CM

## 2021-08-16 DIAGNOSIS — D22.9 MULTIPLE BENIGN NEVI: ICD-10-CM

## 2021-08-16 DIAGNOSIS — L82.1 SEBORRHEIC KERATOSES: ICD-10-CM

## 2021-08-16 DIAGNOSIS — L81.4 LENTIGINES: ICD-10-CM

## 2021-08-16 DIAGNOSIS — L91.8 SKIN TAG: ICD-10-CM

## 2021-08-16 DIAGNOSIS — L82.0 INFLAMED SEBORRHEIC KERATOSIS: Primary | ICD-10-CM

## 2021-08-16 PROCEDURE — 17110 DESTRUCTION B9 LES UP TO 14: CPT | Performed by: PHYSICIAN ASSISTANT

## 2021-08-16 PROCEDURE — 99203 OFFICE O/P NEW LOW 30 MIN: CPT | Mod: 25 | Performed by: PHYSICIAN ASSISTANT

## 2021-08-16 NOTE — PATIENT INSTRUCTIONS
WOUND CARE INSTRUCTIONS  FOR CRYOSURGERY  For questions please call [phone]        This area treated with liquid nitrogen will form a blister. You do not need to bandage the area until after the blister forms and breaks (which may be a few days).  When the blister breaks, begin daily dressing changes as follows:    1) Clean and dry the area with tap water using clean Q-tip or sterile gauze pad.    2) Apply Vaseline or Aquaphor over entire wound. Other options include Polysporin ointment or Bacitracin ointment over entire wound.  Do NOT use Neosporin ointment.    3) Cover the wound with a band-aid or sterile non-stick gauze pad and micropore paper tape.      REPEAT THESE INSTRUCTIONS AT LEAST ONCE A DAY UNTIL THE WOUND HAS COMPLETELY HEALED.        It is an old wives tale that a wound heals better when it is exposed to air and allowed to dry out. The wound will heal faster with a better cosmetic result if it is kept moist with ointment and covered with a bandage.  Do not let the wound dry out.      Supplies Needed:     *Cotton tipped applicators (Q-tips)   *Polysporin ointment or Bacitracin ointment (NOT NEOSPORIN)   *Band-aids, or non stick gauze pads and micropore paper tape    PATIENT INFORMATION    During the healing process you will notice a number of changes. All wounds develop a small halo of redness surrounding the wound.  This means healing is occurring. Severe itching with extensive redness usually indicates sensitivity to the ointment or bandage tape used to dress the wound.  You should call our office if this develops.      Swelling and/or discoloration around your surgical site is common, particularly when performed around the eye.    All wounds normally drain.  The larger the wound the more drainage there will be.  After 7-10 days, you will notice the wound beginning to shrink and new skin will begin to grow.  The wound is healed when you can see skin has formed over the entire area.  A healed  wound has a healthy, shiny look to the surface and is red to dark pink in color to normalize.  Wounds may take approximately 4-6 weeks to heal.  Larger wounds may take 6-8 weeks.  After the wound is healed you may discontinue dressing changes.    You may experience a sensation of tightness as your wound heals. This is normal and will gradually subside.    Your healed wound may be sensitive to temperature changes. This sensitivity improves with time, but if you re having a lot of discomfort, try to avoid temperature extremes.    Patients frequently experience itching after their wound appears to have healed because of the continue healing under the skin.  Plain Vaseline will help relieve the itching.             Proper skin care from [facility]:    -Eliminate harsh soaps as they strip the natural oils from the skin, often resulting in dry itchy skin ( i.e. Dial, Zest, Roseline Spring)  -Use mild soaps such as Cetaphil or Dove Sensitive Skin in the shower. You do not need to use soap on arms, legs, and trunk every time you shower unless visibly soiled.   -Avoid hot or cold showers.  -After showering, lightly dry off and apply moisturizing within 2-3 minutes. This will help trap moisture in the skin.   -Aggressive use of a moisturizer at least 1-2 times a day to the entire body (including -Vanicream, Cetaphil, Aquaphor or Cerave) and moisturize hands after every washing.  -We recommend using moisturizers that come in a tub that needs to be scooped out, not a pump. This has more of an oil base. It will hold moisture in your skin much better than a water base moisturizer. The above recommended are non-pore clogging.      Wear a sunscreen with at least SPF 30 on your face, ears, neck and V of the chest daily. Wear sunscreen on other areas of the body if those areas are exposed to the sun throughout the day. Sunscreens can contain physical and/or chemical blockers. Physical blockers are less likely to clog pores,  these include zinc oxide and titanium dioxide. Reapply every two hour and after swimming.     Sunscreen examples: https://www.ewg.org/sunscreen/    UV radiation  UVA radiation remains constant throughout the day and throughout the year. It is a longer wavelength than UVB and therefore penetrates deeper into the skin leading to immediate and delayed tanning, photoaging, and skin cancer. 70-80% of UVA and UVB radiation occurs between the hours of 10am-2pm.  UVB radiation  UVB radiation causes the most harmful effects and is more significant during the summer months. However, snow and ice can reflect UVB radiation leading to skin damage during the winter months as well. UVB radiation is responsible for tanning, burning, inflammation, delayed erythema (pinkness), pigmentation (brown spots), and skin cancer.     I recommend self monthly full body exams and yearly full body exams with a dermatology provider. If you develop a new or changing lesion please follow up for examination. Most skin cancers are pink and scaly or pink and pearly. However, we do see blue/brown/black skin cancers.  Consider the ABCDEs of melanoma when giving yourself your monthly full body exam ( don't forget the groin, buttocks, feet, toes, etc). A-asymmetry, B-borders, C-color, D-diameter, E-elevation or evolving. If you see any of these changes please follow up in clinic. If you cannot see your back I recommend purchasing a hand held mirror to use with a larger wall mirror.

## 2021-08-16 NOTE — LETTER
8/16/2021         RE: Amado Veliz  [address] [facility] [id number]        Dear Colleague,    Thank you for referring your patient, Amado Veliz, to the [facility]. Please see a copy of my visit note below.    HPI:  Amado Veliz is a 61 year old male patient here today for growth on left areola .  Patient states this has been present for unknown.  Patient reports the following symptoms: none .  Patient reports the following previous treatments: none.  Patient reports the following modifying factors: none.  Associated symptoms: none.  Patient has no other skin complaints today. Remainder of the HPI, Meds, PMH, Allergies, FH, and SH was reviewed in chart.    Pertinent Hx:   No personal or family history of skin cancer    Past Medical History:   Diagnosis Date     Anxiety      CAD (coronary artery disease)     Status post stenting of proximal right coronary artery complex ruptured plaque on 23 April 2020.     Hyperlipidemia LDL goal <70      Intestinal infection due to Clostridium difficile      Pharyngoesophageal dysphagia        Past Surgical History:   Procedure Laterality Date     BACK SURGERY      L5-S1 disk herniation      CV CORONARY ANGIOGRAM N/A 4/23/2020    Procedure: Coronary Angiogram;  Surgeon: Derek Walker MD;  Location:  [facility]     CV INTRAVASULAR ULTRASOUND N/A 4/23/2020    Procedure: Intravascular Ultrasound;  Surgeon: Derek Walker MD;  Location:  [facility]     CV PCI STENT DRUG ELUTING       CV PCI STENT DRUG ELUTING N/A 4/23/2020    Procedure: Percutaneous Coronary Intervention Stent Drug Eluting;  Surgeon: Derek Walker MD;  Location:  HE[facility]ARD[facility]ATH LAB        Family History   Problem Relation Age of Onset     Family History Negative Father      Deep Vein Thrombosis Father      Atrial fibrillation Father      Family History Negative Mother      Family History Negative  Brother      Deep Vein Thrombosis Sister      Ovarian Cancer Maternal Grandmother      Coronary Artery Disease Maternal Grandfather        Social History     Socioeconomic History     Marital status:      Spouse name: Not on file     Number of children: 0     Years of education: Not on file     Highest education level: High school graduate   Occupational History     Occupation:    Tobacco Use     Smoking status: Former Smoker     Packs/day: 1.00     Years: 15.00     Pack years: 15.00     Types: Cigarettes     Start date:      Quit date: 3/17/1993     Years since quittin.4     Smokeless tobacco: Never Used   Substance and Sexual Activity     Alcohol use: No     Drug use: No     Sexual activity: Yes     Partners: Female   Other Topics Concern      Service Not Asked     Blood Transfusions Not Asked     Caffeine Concern Not Asked     Occupational Exposure Not Asked     Hobby Hazards Not Asked     Sleep Concern Not Asked     Stress Concern Not Asked     Weight Concern Not Asked     Special Diet Not Asked     Back Care Not Asked     Exercise Not Asked     Bike Helmet Not Asked     Seat Belt Yes     Self-Exams Not Asked     Parent/sibling w/ CABG, MI or angioplasty before 65F 55M? Not Asked   Social History Narrative    SD CHECKED REGULARLY.    GUNS UNLOADED IN CLOSET AT HOME.     Social Determinants of Health     Financial Resource Strain: Low Risk      Difficulty of Paying Living Expenses: Not hard at all   Food Insecurity: No Food Insecurity     Worried About Running Out of Food in the Last Year: Never true     Ran Out of Food in the Last Year: Never true   Transportation Needs: No Transportation Needs     Lack of Transportation (Medical): No     Lack of Transportation (Non-Medical): No   Physical Activity: Sufficiently Active     Days of Exercise per Week: 7 days     Minutes of Exercise per Session: 60 min   Stress: Stress Concern Present     Feeling of Stress : To some  extent   Social Connections: Unknown     Frequency of Communication with Friends and Family: Not asked     Frequency of Social Gatherings with Friends and Family: Not asked     Attends Jehovah's witness Services: Not asked     Active Member of Clubs or Organizations: Not asked     Attends Club or Organization Meetings: Not asked     Marital Status: Not asked   Intimate Partner Violence: Not At Risk     Fear of Current or Ex-Partner: No     Emotionally Abused: No     Physically Abused: No     Sexually Abused: No       Outpatient Encounter Medications as of 8/16/2021   Medication Sig Dispense Refill     aspirin (ASA) 81 MG EC tablet Take 1 tablet (81 mg) by mouth daily 90 tablet 3     atorvastatin (LIPITOR) 20 MG tablet Take 1 tablet (20 mg) by mouth every evening 90 tablet 3     clonazePAM (KLONOPIN) 0.5 MG tablet Take 1 tablet (0.5 mg) by mouth 2 times daily as needed for anxiety (Use infrequently for more acute anxiety) 20 tablet 0     clopidogrel (PLAVIX) 75 MG tablet Take 1 tablet (75 mg) by mouth daily 90 tablet 0     colchicine (MITIGARE) 0.6 MG capsule Take 1 capsule (0.6 mg) by mouth daily 90 capsule 0     metoprolol succinate ER (TOPROL-XL) 25 MG 24 hr tablet Take 0.5 tablets (12.5 mg) by mouth daily 90 tablet 1     multivitamin w/minerals (MULTI-VITAMIN) tablet Take 1 tablet by mouth daily       nitroGLYcerin (NITROSTAT) 0.4 MG sublingual tablet For chest pain place 1 tablet under the tongue every 5 minutes for 3 doses. If symptoms persist 5 minutes after 1st dose call 911. 30 tablet 0     sertraline (ZOLOFT) 100 MG tablet Take 2 tablets (200 mg) by mouth daily 60 tablet 2     No facility-administered encounter medications on file as of 8/16/2021.       Review Of Systems:  Skin: sk  Eyes: negative  Ears/Nose/Throat: negative  Respiratory: No shortness of breath, dyspnea on exertion, cough, or hemoptysis  Cardiovascular: negative  Gastrointestinal: negative  Genitourinary: negative  Musculoskeletal:  negative  Neurologic: negative  Psychiatric: negative  Hematologic/Lymphatic/Immunologic: negative  Endocrine: negative      Objective:     There were no vitals taken for this visit.  Eyes: Conjunctivae/lids: Normal   ENT: Lips:  Normal  MSK: Normal  Cardiovascular: Peripheral edema none  Pulm: Breathing Normal  Neuro/Psych: Orientation: A/O x 3. Normal; Mood/Affect: Normal, NAD, WDWN  Pt accompanied by: self  Following areas examined: Scalp, face, eyelids, lips, neck, chest, abdomen, back, R&L upper and lower extremities.  Pt defers exam of buttock, hips, groin and genitals.   Paulson skin type:i   Findings:  Red smooth well-defined macules on trunk and extremities.  Brown, stuck-on scaly appearing papules on trunk and extremities.  Well circumscribed macules with symmetric color distribution on trunk and extremities.  Tan WD smooth macules on face, neck, trunk, and extremities.  Inflamed brown, stuck-on scaly appearing papules on left areola    Assessment and Plan:     1) Cherry angiomas, Seborrheic keratoses, Benign nevi, Lentigines, skin tags     I discussed the specifics of tumor, prognosis, and genetics of benign lesions.  I explained that treatment of these lesions would be purely cosmetic and not medically neccessary.  I discussed with patient different removal options including excision, cryotherapy, cautery and /or laser.  Lesion may recur and/or may not completely resolve. May need additional treatment.     2) ISK x 1  Benign etiology and course of lesion.  LN2: Treated with LN2 for 5s for 1-2 cycles. Warned risks of blistering, pain, pigment change, scarring, and incomplete resolution.  Advised patient to return if lesions do not completely resolve within 2-3 months.  Wound care sheet given.    Signs and Symptoms of non-melanoma skin cancer and ABCDEs of melanoma reviewed with patient. Patient encouraged to perform monthly self skin exams and educated on how to perform them. UV precautions reviewed  with patient. Patient was asked about new or changing moles/lesions on body.   Wear a sunscreen with at least SPF 30 on your face, ears, neck and V of the chest daily. Wear sunscreen on other areas of the body if those areas are exposed to the sun throughout the day. Sunscreens can contain physical and/or chemical blockers. Physical blockers are less likely to clog pores, these include zinc oxide and titanium dioxide. Reapply every two hour and after swimming.Sunscreen examples: https://www.ewg.org/sunscreen/    Proper skin care from [facility]:    -Eliminate harsh soaps as they strip the natural oils from the skin, often resulting in dry itchy skin ( i.e. Dial, Zest, [place])  -Use mild soaps such as Cetaphil or Dove Sensitive Skin in the shower. You do not need to use soap on arms, legs, and trunk every time you shower unless visibly soiled.   -Avoid hot or cold showers.  -After showering, lightly dry off and apply moisturizing within 2-3 minutes. This will help trap moisture in the skin.   -Aggressive use of a moisturizer at least 1-2 times a day to the entire body (including -Vanicream, Cetaphil, Aquaphor or Cerave) and moisturize hands after every washing.  -We recommend using moisturizers that come in a tub that needs to be scooped out, not a pump. This has more of an oil base. It will hold moisture in your skin much better than a water base moisturizer. The above recommended are non-pore clogging.         It was a pleasure speaking with Amado Veliz today.       Follow up in E q2-3 years         Again, thank you for allowing me to participate in the care of your patient.        Sincerely,        Kay Dawson PA-C

## 2021-08-16 NOTE — PROGRESS NOTES
HPI:  Amado Veliz is a 61 year old male patient here today for growth on left areola .  Patient states this has been present for unknown.  Patient reports the following symptoms: none .  Patient reports the following previous treatments: none.  Patient reports the following modifying factors: none.  Associated symptoms: none.  Patient has no other skin complaints today. Remainder of the HPI, Meds, PMH, Allergies, FH, and SH was reviewed in chart.    Pertinent Hx:   No personal or family history of skin cancer    Past Medical History:   Diagnosis Date     Anxiety      CAD (coronary artery disease)     Status post stenting of proximal right coronary artery complex ruptured plaque on 23 April 2020.     Hyperlipidemia LDL goal <70      Intestinal infection due to Clostridium difficile      Pharyngoesophageal dysphagia        Past Surgical History:   Procedure Laterality Date     BACK SURGERY      L5-S1 disk herniation      CV CORONARY ANGIOGRAM N/A 4/23/2020    Procedure: Coronary Angiogram;  Surgeon: Derek Walker MD;  Location:  [facility]ARD[facility]     CV INTRAVASULAR ULTRASOUND N/A 4/23/2020    Procedure: Intravascular Ultrasound;  Surgeon: Derek Walker MD;  Location:  [facility]ARD[facility] CATH LAB     CV PCI STENT DRUG ELUTING       CV PCI STENT DRUG ELUTING N/A 4/23/2020    Procedure: Percutaneous Coronary Intervention Stent Drug Eluting;  Surgeon: Derek Walker MD;  Location:  HE[facility] CARDI[facility] CATH LAB        Family History   Problem Relation Age of Onset     Family History Negative Father      Deep Vein Thrombosis Father      Atrial fibrillation Father      Family History Negative Mother      Family History Negative Brother      Deep Vein Thrombosis Sister      Ovarian Cancer Maternal Grandmother      Coronary Artery Disease Maternal Grandfather        Social History     Socioeconomic History     Marital status:      Spouse name: Not on file     Number of children: 0      Years of education: Not on file     Highest education level: High school graduate   Occupational History     Occupation:    Tobacco Use     Smoking status: Former Smoker     Packs/day: 1.00     Years: 15.00     Pack years: 15.00     Types: Cigarettes     Start date:      Quit date: 3/17/1993     Years since quittin.4     Smokeless tobacco: Never Used   Substance and Sexual Activity     Alcohol use: No     Drug use: No     Sexual activity: Yes     Partners: Female   Other Topics Concern      Service Not Asked     Blood Transfusions Not Asked     Caffeine Concern Not Asked     Occupational Exposure Not Asked     Hobby Hazards Not Asked     Sleep Concern Not Asked     Stress Concern Not Asked     Weight Concern Not Asked     Special Diet Not Asked     Back Care Not Asked     Exercise Not Asked     Bike Helmet Not Asked     Seat Belt Yes     Self-Exams Not Asked     Parent/sibling w/ CABG, MI or angioplasty before 65F 55M? Not Asked   Social History Narrative    SD CHECKED REGULARLY.    GUNS UNLOADED IN CLOSET AT HOME.     Social Determinants of Health     Financial Resource Strain: Low Risk      Difficulty of Paying Living Expenses: Not hard at all   Food Insecurity: No Food Insecurity     Worried About Running Out of Food in the Last Year: Never true     Ran Out of Food in the Last Year: Never true   Transportation Needs: No Transportation Needs     Lack of Transportation (Medical): No     Lack of Transportation (Non-Medical): No   Physical Activity: Sufficiently Active     Days of Exercise per Week: 7 days     Minutes of Exercise per Session: 60 min   Stress: Stress Concern Present     Feeling of Stress : To some extent   Social Connections: Unknown     Frequency of Communication with Friends and Family: Not asked     Frequency of Social Gatherings with Friends and Family: Not asked     Attends Worship Services: Not asked     Active Member of Clubs or Organizations: Not asked      Attends Club or Organization Meetings: Not asked     Marital Status: Not asked   Intimate Partner Violence: Not At Risk     Fear of Current or Ex-Partner: No     Emotionally Abused: No     Physically Abused: No     Sexually Abused: No       Outpatient Encounter Medications as of 8/16/2021   Medication Sig Dispense Refill     aspirin (ASA) 81 MG EC tablet Take 1 tablet (81 mg) by mouth daily 90 tablet 3     atorvastatin (LIPITOR) 20 MG tablet Take 1 tablet (20 mg) by mouth every evening 90 tablet 3     clonazePAM (KLONOPIN) 0.5 MG tablet Take 1 tablet (0.5 mg) by mouth 2 times daily as needed for anxiety (Use infrequently for more acute anxiety) 20 tablet 0     clopidogrel (PLAVIX) 75 MG tablet Take 1 tablet (75 mg) by mouth daily 90 tablet 0     colchicine (MITIGARE) 0.6 MG capsule Take 1 capsule (0.6 mg) by mouth daily 90 capsule 0     metoprolol succinate ER (TOPROL-XL) 25 MG 24 hr tablet Take 0.5 tablets (12.5 mg) by mouth daily 90 tablet 1     multivitamin w/minerals (MULTI-VITAMIN) tablet Take 1 tablet by mouth daily       nitroGLYcerin (NITROSTAT) 0.4 MG sublingual tablet For chest pain place 1 tablet under the tongue every 5 minutes for 3 doses. If symptoms persist 5 minutes after 1st dose call 911. 30 tablet 0     sertraline (ZOLOFT) 100 MG tablet Take 2 tablets (200 mg) by mouth daily 60 tablet 2     No facility-administered encounter medications on file as of 8/16/2021.       Review Of Systems:  Skin: sk  Eyes: negative  Ears/Nose/Throat: negative  Respiratory: No shortness of breath, dyspnea on exertion, cough, or hemoptysis  Cardiovascular: negative  Gastrointestinal: negative  Genitourinary: negative  Musculoskeletal: negative  Neurologic: negative  Psychiatric: negative  Hematologic/Lymphatic/Immunologic: negative  Endocrine: negative      Objective:     There were no vitals taken for this visit.  Eyes: Conjunctivae/lids: Normal   ENT: Lips:  Normal  MSK: Normal  Cardiovascular: Peripheral edema  none  Pulm: Breathing Normal  Neuro/Psych: Orientation: A/O x 3. Normal; Mood/Affect: Normal, NAD, WDWN  Pt accompanied by: self  Following areas examined: Scalp, face, eyelids, lips, neck, chest, abdomen, back, R&L upper and lower extremities.  Pt defers exam of buttock, hips, groin and genitals.   Paulson skin type:i   Findings:  Red smooth well-defined macules on trunk and extremities.  Brown, stuck-on scaly appearing papules on trunk and extremities.  Well circumscribed macules with symmetric color distribution on trunk and extremities.  Tan WD smooth macules on face, neck, trunk, and extremities.  Inflamed brown, stuck-on scaly appearing papules on left areola    Assessment and Plan:     1) Cherry angiomas, Seborrheic keratoses, Benign nevi, Lentigines, skin tags     I discussed the specifics of tumor, prognosis, and genetics of benign lesions.  I explained that treatment of these lesions would be purely cosmetic and not medically neccessary.  I discussed with patient different removal options including excision, cryotherapy, cautery and /or laser.  Lesion may recur and/or may not completely resolve. May need additional treatment.     2) ISK x 1  Benign etiology and course of lesion.  LN2: Treated with LN2 for 5s for 1-2 cycles. Warned risks of blistering, pain, pigment change, scarring, and incomplete resolution.  Advised patient to return if lesions do not completely resolve within 2-3 months.  Wound care sheet given.    Signs and Symptoms of non-melanoma skin cancer and ABCDEs of melanoma reviewed with patient. Patient encouraged to perform monthly self skin exams and educated on how to perform them. UV precautions reviewed with patient. Patient was asked about new or changing moles/lesions on body.   Wear a sunscreen with at least SPF 30 on your face, ears, neck and V of the chest daily. Wear sunscreen on other areas of the body if those areas are exposed to the sun throughout the day. Sunscreens can  contain physical and/or chemical blockers. Physical blockers are less likely to clog pores, these include zinc oxide and titanium dioxide. Reapply every two hour and after swimming.Sunscreen examples: https://www.ewg.org/sunscreen/    Proper skin care from [facility]:    -Eliminate harsh soaps as they strip the natural oils from the skin, often resulting in dry itchy skin ( i.e. Dial, Zest, [place])  -Use mild soaps such as Cetaphil or Dove Sensitive Skin in the shower. You do not need to use soap on arms, legs, and trunk every time you shower unless visibly soiled.   -Avoid hot or cold showers.  -After showering, lightly dry off and apply moisturizing within 2-3 minutes. This will help trap moisture in the skin.   -Aggressive use of a moisturizer at least 1-2 times a day to the entire body (including -Vanicream, Cetaphil, Aquaphor or Cerave) and moisturize hands after every washing.  -We recommend using moisturizers that come in a tub that needs to be scooped out, not a pump. This has more of an oil base. It will hold moisture in your skin much better than a water base moisturizer. The above recommended are non-pore clogging.         It was a pleasure speaking with Amado Veliz today.       Follow up in E q2-3 years

## 2021-08-17 ENCOUNTER — MYC MEDICAL ADVICE (OUTPATIENT)
Dept: INTERNAL MEDICINE | Facility: CLINIC | Age: 61
End: 2021-08-17

## 2021-08-17 ENCOUNTER — TRANSFERRED RECORDS (OUTPATIENT)
Dept: HEALTH INFORMATION MANAGEMENT | Facility: CLINIC | Age: 61
End: 2021-08-17

## 2021-08-17 DIAGNOSIS — F41.1 GAD (GENERALIZED ANXIETY DISORDER): ICD-10-CM

## 2021-08-18 RX ORDER — SERTRALINE HYDROCHLORIDE 100 MG/1
200 TABLET, FILM COATED ORAL DAILY
Qty: 60 TABLET | Refills: 2 | Status: SHIPPED | OUTPATIENT
Start: 2021-08-18 | End: 2021-11-23

## 2021-08-18 NOTE — TELEPHONE ENCOUNTER
Please see [employer] message    Pending Prescriptions:                       Disp   Refills    sertraline (ZOLOFT) 100 MG tablet         60 tab*2            Sig: Take 2 tablets (200 mg) by mouth daily    Routing refill request to provider for review/approval because:  Seeking pcp approval   Last ov 3/26/21

## 2021-09-02 ENCOUNTER — OFFICE VISIT (OUTPATIENT)
Dept: CARDIOLOGY | Facility: CLINIC | Age: 61
End: 2021-09-02
Attending: INTERNAL MEDICINE
Payer: COMMERCIAL

## 2021-09-02 VITALS
HEIGHT: 74 IN | BODY MASS INDEX: 30.26 KG/M2 | SYSTOLIC BLOOD PRESSURE: 113 MMHG | HEART RATE: 59 BPM | WEIGHT: 235.8 LBS | DIASTOLIC BLOOD PRESSURE: 72 MMHG

## 2021-09-02 DIAGNOSIS — I25.10 CORONARY ARTERY DISEASE INVOLVING NATIVE CORONARY ARTERY OF NATIVE HEART WITHOUT ANGINA PECTORIS: Primary | ICD-10-CM

## 2021-09-02 DIAGNOSIS — E78.5 HYPERLIPIDEMIA LDL GOAL <70: ICD-10-CM

## 2021-09-02 DIAGNOSIS — Z95.5 S/P RIGHT CORONARY ARTERY (RCA) STENT PLACEMENT: ICD-10-CM

## 2021-09-02 PROCEDURE — 99214 OFFICE O/P EST MOD 30 MIN: CPT | Performed by: NURSE PRACTITIONER

## 2021-09-02 RX ORDER — NITROGLYCERIN 0.4 MG/1
0.4 TABLET SUBLINGUAL EVERY 5 MIN PRN
Qty: 30 TABLET | Refills: 0 | Status: SHIPPED | OUTPATIENT
Start: 2021-09-02 | End: 2023-11-08

## 2021-09-02 ASSESSMENT — MIFFLIN-ST. JEOR: SCORE: 1944.33

## 2021-09-02 NOTE — PROGRESS NOTES
Cardiology Clinic Progress Note  Amado Veliz MRN# [id number]   YOB: 1960 Age: 61 year old     Reason For Visit:  Follow up   Primary Cardiologist:   Dr. Wayne          History of Presenting Illness:      Amado Veliz is a pleasant 61 year old patient who carries a past medical history significant for CAD s/p LUZ to the proximal RCA (4/2020), hyperlipidemia, anxiety and remote tobacco abuse. He returns to the office today for follow up.     Over the last year he has continued with chronic chest discomfort resolved with chronic colchicine use.  He underwent a, stress cardiac MRI that demonstrated normal biventricular size and function, normal EF estimated at 63%, normal RV, negative stress-induced perfusion study, no scar evidence of infiltrative disease or pericardial hyperenhancement.  In May, he weaned off colchicine without any recurrent symptoms.     Today, he is feeling well on a cardiac standpoint, denies chest pain, shortness of breath, PND, orthopnea, presyncope, syncope, edema, heart racing, or palpitations.  He tells me his anxiety is very well controlled and hopeful he can stop sertraline in the near future.  He remains very active and walks on a daily basis.  He admits over the last year he has gained approximately 15 pounds (BMI 30.27 ) which he attributes to diet and reduced exercise.     Blood pressure is well controlled at 113/72, managed on low-dose metoprolol  Last lipid panel showed a total cholesterol of 134, HDL 40, LDL 66, and triglycerides 142, on low-dose atorvastatin.    Remains compliant with all medications.                   Assessment and Plan:       1.  Coronary artery disease -status post drug-eluting stent to the RCA (4/2020).  Denies any ischemic symptoms.  Last cardiac stress MRI showed normal LV function with EF estimated at 63%, negative stress-induced perfusion study with no scar evidence of infiltrative disease or pericardial hyperenhancement.  Continue  on aspirin lifelong, atorvastatin, and low-dose metoprolol.  Encourage continued exercise, weight loss, and strict heart healthy diet.    2.  Hyperlipidemia -LDL at goal, continue statin.  Due for fasting lipid panel in October.                Thank you for allowing me to participate in this delightful patient's care. I have recommended he follow up in 1 year with Dr. Wayne or sooner if needed..       Janene Mckeon, APRN CNP         Review of Systems:     Review of Systems:  Skin:  Negative     Eyes:  Negative    ENT:  Positive for tinnitus  Respiratory:  Negative shortness of breath;dyspnea on exertion;cough  Cardiovascular:  chest pain;palpitations;Negative;edema;lightheadedness;dizziness    Gastroenterology: Negative    Genitourinary:  Negative    Musculoskeletal:  Negative    Neurologic:  Negative headaches  Psychiatric:  Negative    Heme/Lymph/Imm:  Positive for allergies  Endocrine:  Negative thyroid disorder;diabetes              Physical Exam:     GEN:  In general, this is a well nourished male in no acute distress.  HEENT:  Pupils equal, round. Sclerae nonicteric. Clear oropharynx. Mucous membranes moist.  NECK: Supple, no masses appreciated. Trachea midline.  No JVD   C/V:  Regular rate and rhythm, no murmur, rub or gallop. No S3 or RV heave.   RESP: Respirations are unlabored. No use of accessory muscles. Clear to auscultation bilaterally without wheezing, rales, or rhonchi.  GI: Abdomen soft, nontender, nondistended. No HSM appreciated.   EXTREM: No LE edema. No cyanosis or clubbing.  NEURO: Alert and oriented, cooperative. No obvious focal deficits.   PSYCH: Normal affect.  SKIN: Warm and dry. No rashes or petechiae appreciated.          Past Medical History:     Past Medical History:   Diagnosis Date     Anxiety      CAD (coronary artery disease)     Status post stenting of proximal right coronary artery complex ruptured plaque on 23 April 2020.     Hyperlipidemia LDL goal <70      Intestinal  infection due to Clostridium difficile      Pharyngoesophageal dysphagia               Past Surgical History:     Past Surgical History:   Procedure Laterality Date     BACK SURGERY      L5-S1 disk herniation      CV CORONARY ANGIOGRAM N/A 4/23/2020    Procedure: Coronary Angiogram;  Surgeon: Derek Walker MD;  Location:  HE[facility]ARDI[facility]ATH LAB     CV INTRAVASULAR ULTRASOUND N/A 4/23/2020    Procedure: Intravascular Ultrasound;  Surgeon: Derek Walker MD;  Location:  HE[facility]ARD[facility] CATH LAB     CV PCI STENT DRUG ELUTING       CV PCI STENT DRUG ELUTING N/A 4/23/2020    Procedure: Percutaneous Coronary Intervention Stent Drug Eluting;  Surgeon: Derek Walker MD;  Location:  HEART CARDI[facility] CATH LAB              Allergies:   Niacin       Data:   All laboratory data reviewed:    LAST CHOLESTEROL:  Lab Results   Component Value Date    CHOL 134 10/19/2020     Lab Results   Component Value Date    HDL 40 10/19/2020     Lab Results   Component Value Date    LDL 66 10/19/2020     Lab Results   Component Value Date    TRIG 142 10/19/2020     Lab Results   Component Value Date    CHOLHDLRATIO 6 10/09/2002       LAST BMP:  Lab Results   Component Value Date     10/19/2020      Lab Results   Component Value Date    POTASSIUM 3.7 10/19/2020     Lab Results   Component Value Date    CHLORIDE 104 10/19/2020     Lab Results   Component Value Date    DANIELITO 9.0 10/19/2020     Lab Results   Component Value Date    CO2 31 10/19/2020     Lab Results   Component Value Date    BUN 11 10/19/2020     Lab Results   Component Value Date    CR 0.74 10/19/2020     Lab Results   Component Value Date    GLC 89 10/19/2020       LAST CBC:  Lab Results   Component Value Date    WBC 4.1 08/12/2020     Lab Results   Component Value Date    RBC 5.11 08/12/2020     Lab Results   Component Value Date    HGB 15.6 08/12/2020     Lab Results   Component Value Date    HCT 46.5 08/12/2020     Lab Results   Component Value  Date    MCV 91 08/12/2020     Lab Results   Component Value Date    MCH 30.5 08/12/2020     Lab Results   Component Value Date    MCHC 33.5 08/12/2020     Lab Results   Component Value Date    RDW 11.9 08/12/2020     Lab Results   Component Value Date     08/12/2020

## 2021-09-02 NOTE — LETTER
9/2/2021    Ildefonso Lucas MD, MD  [address] [facility] [address]  [facility] [address]    RE: Amado Veliz       Dear Colleague,    I had the pleasure of seeing Amado Veliz in the [facility].    Cardiology Clinic Progress Note  Amado Veliz MRN# [id number]   YOB: 1960 Age: 61 year old     Reason For Visit:  Follow up   Primary Cardiologist:   Dr. Wayne          History of Presenting Illness:      Amado Veliz is a pleasant 61 year old patient who carries a past medical history significant for CAD s/p LUZ to the proximal RCA (4/2020), hyperlipidemia, anxiety and remote tobacco abuse. He returns to the office today for follow up.     Over the last year he has continued with chronic chest discomfort resolved with chronic colchicine use.  He underwent a, stress cardiac MRI that demonstrated normal biventricular size and function, normal EF estimated at 63%, normal RV, negative stress-induced perfusion study, no scar evidence of infiltrative disease or pericardial hyperenhancement.  In May, he weaned off colchicine without any recurrent symptoms.     Today, he is feeling well on a cardiac standpoint, denies chest pain, shortness of breath, PND, orthopnea, presyncope, syncope, edema, heart racing, or palpitations.  He tells me his anxiety is very well controlled and hopeful he can stop sertraline in the near future.  He remains very active and walks on a daily basis.  He admits over the last year he has gained approximately 15 pounds (BMI 30.27 ) which he attributes to diet and reduced exercise.     Blood pressure is well controlled at 113/72, managed on low-dose metoprolol  Last lipid panel showed a total cholesterol of 134, HDL 40, LDL 66, and triglycerides 142, on low-dose atorvastatin.    Remains compliant with all medications.                   Assessment and Plan:       1.  Coronary artery disease -status post drug-eluting stent  to the RCA (4/2020).  Denies any ischemic symptoms.  Last cardiac stress MRI showed normal LV function with EF estimated at 63%, negative stress-induced perfusion study with no scar evidence of infiltrative disease or pericardial hyperenhancement.  Continue on aspirin lifelong, atorvastatin, and low-dose metoprolol.  Encourage continued exercise, weight loss, and strict heart healthy diet.    2.  Hyperlipidemia -LDL at goal, continue statin.  Due for fasting lipid panel in October.                Thank you for allowing me to participate in this delightful patient's care. I have recommended he follow up in 1 year with Dr. Wayne or sooner if needed..       Janene Mckeon, APRN CNP         Review of Systems:     Review of Systems:  Skin:  Negative     Eyes:  Negative    ENT:  Positive for tinnitus  Respiratory:  Negative shortness of breath;dyspnea on exertion;cough  Cardiovascular:  chest pain;palpitations;Negative;edema;lightheadedness;dizziness    Gastroenterology: Negative    Genitourinary:  Negative    Musculoskeletal:  Negative    Neurologic:  Negative headaches  Psychiatric:  Negative    Heme/Lymph/Imm:  Positive for allergies  Endocrine:  Negative thyroid disorder;diabetes              Physical Exam:     GEN:  In general, this is a well nourished male in no acute distress.  HEENT:  Pupils equal, round. Sclerae nonicteric. Clear oropharynx. Mucous membranes moist.  NECK: Supple, no masses appreciated. Trachea midline.  No JVD   C/V:  Regular rate and rhythm, no murmur, rub or gallop. No S3 or RV heave.   RESP: Respirations are unlabored. No use of accessory muscles. Clear to auscultation bilaterally without wheezing, rales, or rhonchi.  GI: Abdomen soft, nontender, nondistended. No HSM appreciated.   EXTREM: No LE edema. No cyanosis or clubbing.  NEURO: Alert and oriented, cooperative. No obvious focal deficits.   PSYCH: Normal affect.  SKIN: Warm and dry. No rashes or petechiae appreciated.          Past  Medical History:     Past Medical History:   Diagnosis Date     Anxiety      CAD (coronary artery disease)     Status post stenting of proximal right coronary artery complex ruptured plaque on 23 April 2020.     Hyperlipidemia LDL goal <70      Intestinal infection due to Clostridium difficile      Pharyngoesophageal dysphagia               Past Surgical History:     Past Surgical History:   Procedure Laterality Date     BACK SURGERY      L5-S1 disk herniation      CV CORONARY ANGIOGRAM N/A 4/23/2020    Procedure: Coronary Angiogram;  Surgeon: Derek Walker MD;  Location:  HEART CARDIAC CATH LAB     CV INTRAVASULAR ULTRASOUND N/A 4/23/2020    Procedure: Intravascular Ultrasound;  Surgeon: Derek Walker MD;  Location:  HEART CARDIAC CATH LAB     CV PCI STENT DRUG ELUTING       CV PCI STENT DRUG ELUTING N/A 4/23/2020    Procedure: Percutaneous Coronary Intervention Stent Drug Eluting;  Surgeon: Derek Walker MD;  Location:  HEART CARDIAC CATH LAB              Allergies:   Niacin       Data:   All laboratory data reviewed:    LAST CHOLESTEROL:  Lab Results   Component Value Date    CHOL 134 10/19/2020     Lab Results   Component Value Date    HDL 40 10/19/2020     Lab Results   Component Value Date    LDL 66 10/19/2020     Lab Results   Component Value Date    TRIG 142 10/19/2020     Lab Results   Component Value Date    CHOLHDLRATIO 6 10/09/2002       LAST BMP:  Lab Results   Component Value Date     10/19/2020      Lab Results   Component Value Date    POTASSIUM 3.7 10/19/2020     Lab Results   Component Value Date    CHLORIDE 104 10/19/2020     Lab Results   Component Value Date    DANIELITO 9.0 10/19/2020     Lab Results   Component Value Date    CO2 31 10/19/2020     Lab Results   Component Value Date    BUN 11 10/19/2020     Lab Results   Component Value Date    CR 0.74 10/19/2020     Lab Results   Component Value Date    GLC 89 10/19/2020       LAST CBC:  Lab Results   Component  Value Date    WBC 4.1 08/12/2020     Lab Results   Component Value Date    RBC 5.11 08/12/2020     Lab Results   Component Value Date    HGB 15.6 08/12/2020     Lab Results   Component Value Date    HCT 46.5 08/12/2020     Lab Results   Component Value Date    MCV 91 08/12/2020     Lab Results   Component Value Date    MCH 30.5 08/12/2020     Lab Results   Component Value Date    MCHC 33.5 08/12/2020     Lab Results   Component Value Date    RDW 11.9 08/12/2020     Lab Results   Component Value Date     08/12/2020               Thank you for allowing me to participate in the care of your patient.      Sincerely,     GLORIA Paz [facility]  cc:   Abelino Wayne MD  [address]  [place] [address]

## 2021-09-02 NOTE — PATIENT INSTRUCTIONS
Thanks for participating in a office visit with the [facility] clinic today.    Doing well on a cardiac standpoint  Continue on aspirin, lipitor and metoprolol  Follow up fasting lipid panel in 1 month.   Encourage exercise, weight loss, and heart healthy diet.    Follow up in 1 year with Dr Wayne     Please call my nurse at [phone] with any questions or concerns.    Scheduling phone number: [phone]  Reminder: Please bring in all current medications, over the counter supplements and vitamin bottles to your next appointment.

## 2021-09-08 ENCOUNTER — OFFICE VISIT (OUTPATIENT)
Dept: INTERNAL MEDICINE | Facility: CLINIC | Age: 61
End: 2021-09-08
Payer: COMMERCIAL

## 2021-09-08 VITALS
HEART RATE: 67 BPM | TEMPERATURE: 96.2 F | WEIGHT: 236 LBS | RESPIRATION RATE: 16 BRPM | HEIGHT: 74 IN | OXYGEN SATURATION: 98 % | BODY MASS INDEX: 30.29 KG/M2 | DIASTOLIC BLOOD PRESSURE: 64 MMHG | SYSTOLIC BLOOD PRESSURE: 114 MMHG

## 2021-09-08 DIAGNOSIS — K20.0 EOSINOPHILIC ESOPHAGITIS: ICD-10-CM

## 2021-09-08 DIAGNOSIS — F41.9 ANXIETY: Primary | ICD-10-CM

## 2021-09-08 DIAGNOSIS — E78.5 HYPERLIPIDEMIA LDL GOAL <70: ICD-10-CM

## 2021-09-08 DIAGNOSIS — I25.10 CORONARY ARTERY DISEASE INVOLVING NATIVE CORONARY ARTERY OF NATIVE HEART WITHOUT ANGINA PECTORIS: ICD-10-CM

## 2021-09-08 DIAGNOSIS — R13.10 DYSPHAGIA, UNSPECIFIED TYPE: ICD-10-CM

## 2021-09-08 DIAGNOSIS — Z12.5 SCREENING PSA (PROSTATE SPECIFIC ANTIGEN): ICD-10-CM

## 2021-09-08 PROCEDURE — 99214 OFFICE O/P EST MOD 30 MIN: CPT | Performed by: INTERNAL MEDICINE

## 2021-09-08 ASSESSMENT — MIFFLIN-ST. JEOR: SCORE: 1945.24

## 2021-09-08 ASSESSMENT — PATIENT HEALTH QUESTIONNAIRE - PHQ9: SUM OF ALL RESPONSES TO PHQ QUESTIONS 1-9: 1

## 2021-09-08 NOTE — PATIENT INSTRUCTIONS
"Recommend sticking with sertraline for another year, as suggested by Dr Cornell.   If you become resolved that you want to stop sertraline sooner, let me know because a taper will be required.     Agree with seeing specialists as planned.   Set up a fasting \"lab only\" appointment.     See me in about six months.   "

## 2021-09-08 NOTE — PROGRESS NOTES
"  Assessment & Plan   (F41.9) Anxiety  (primary encounter diagnosis)  Comment: Continue sertraline at current dose for now. F/u with psychiatry as they recommend.     (I25.10) Coronary artery disease involving native coronary artery of native heart without angina pectoris  Comment: No recent symptoms convincing for angina. Cardiology has provided reassurance.     (E78.5) Hyperlipidemia LDL goal <70  Comment: Future lipids ordered. Continue atorvastatin.   Plan: **Comprehensive metabolic panel FUTURE 14d,         Lipid panel reflex to direct LDL Fasting          (R13.10) Dysphagia, unspecified type  (K20.0) Eosinophilic esophagitis  Comment: Continue ingestion of inhaled corticosteroids as prescribed by GI. F/u with GI as they advise.     (Z12.5) Screening PSA (prostate specific antigen)  Plan: PSA, screen           BMI:   Estimated body mass index is 30.3 kg/m  as calculated from the following:    Height as of this encounter: 1.88 m (6' 2\").    Weight as of this encounter: 107 kg (236 lb).       Patient Instructions   Recommend sticking with sertraline for another year, as suggested by Dr Cornell.   If you become resolved that you want to stop sertraline sooner, let me know because a taper will be required.     Agree with seeing specialists as planned.   Set up a fasting \"lab only\" appointment.     See me in about six months.       Return in about 6 months (around 3/8/2022) for Routine Visit.    Ildefonso Lucas MD,   [facility] TONY Banegas is a 61 year old who presents for the following health issues : Follow up.    HPI     Hyperlipidemia Follow-Up      Are you regularly taking any medication or supplement to lower your cholesterol?   Yes- Lipitor    Are you having muscle aches or other side effects that you think could be caused by your cholesterol lowering medication?  No    Hypertension Follow-up      Do you check your blood pressure regularly outside of the clinic? Yes     Are you " "following a low salt diet? Yes    Are your blood pressures ever more than 140 on the top number (systolic) OR more   than 90 on the bottom number (diastolic), for example 140/90? No      How many servings of fruits and vegetables do you eat daily?  4 or more    On average, how many sweetened beverages do you drink each day (Examples: soda, juice, sweet tea, etc.  Do NOT count diet or artificially sweetened beverages)?   0    How many days per week do you exercise enough to make your heart beat faster? 4    How many minutes a day do you exercise enough to make your heart beat faster? 30 - 60    How many days per week do you miss taking your medication? 0    The patient weaned off of colchicine in May. Chest pains have essentially resolved.     He has seen Ivon Cornell DO to review selective serotonin reuptake inhibitor medications. He would like to taper down on sertraline. However, he is open to continuing on it for several months as recommended by Dr Cornell.     He will be following with cardiology as they advise.    He has worked with GI regarding swallowing difficulties. An EGD showed eosinophilic esophagitis, and he reports being prescribed a corticosteroid inhaler, to swallow the medication rather than to inhale the dose.     Past medical, family and social histories as well as medications reviewed and updated as needed.    Review of Systems   REVIEW OF SYSTEMS: The following systems have been completely reviewed and are negative except as noted above:   Constitutional, HEENT, respiratory, cardiovascular, gastrointestinal, genitourinary, musculoskeletal, psychiatric, and neurologic systems.        Objective    /64   Pulse 67   Temp (!) 96.2  F (35.7  C) (Tympanic)   Resp 16   Ht 1.88 m (6' 2\")   Wt 107 kg (236 lb)   SpO2 98%   BMI 30.30 kg/m    Body mass index is 30.3 kg/m .  Physical Exam   GENERAL: healthy, alert and no distress  EYES: Eyes grossly normal to inspection, PERRL and conjunctivae and " sclerae normal  RESP: lungs clear to auscultation - no rales, rhonchi or wheezes  CV: regular rate and rhythm, normal S1 S2, no S3 or S4, no murmur, click or rub, no peripheral edema and peripheral pulses strong  MS: no gross musculoskeletal defects noted, no edema  NEURO: Normal strength and tone, mentation intact and speech normal  PSYCH: mentation appears normal, affect normal/bright

## 2021-09-09 ENCOUNTER — OFFICE VISIT (OUTPATIENT)
Dept: OTOLARYNGOLOGY | Facility: CLINIC | Age: 61
End: 2021-09-09

## 2021-09-09 ENCOUNTER — OFFICE VISIT (OUTPATIENT)
Dept: AUDIOLOGY | Facility: CLINIC | Age: 61
End: 2021-09-09
Payer: COMMERCIAL

## 2021-09-09 DIAGNOSIS — H90.3 SENSORINEURAL HEARING LOSS (SNHL) OF BOTH EARS: ICD-10-CM

## 2021-09-09 DIAGNOSIS — H90.3 SENSORINEURAL HEARING LOSS, ASYMMETRICAL: Primary | ICD-10-CM

## 2021-09-09 DIAGNOSIS — H93.13 TINNITUS, BILATERAL: Primary | ICD-10-CM

## 2021-09-09 DIAGNOSIS — Z57.0 OCCUPATIONAL EXPOSURE TO NOISE: ICD-10-CM

## 2021-09-09 DIAGNOSIS — H93.13 TINNITUS OF BOTH EARS: ICD-10-CM

## 2021-09-09 PROCEDURE — 92567 TYMPANOMETRY: CPT | Performed by: AUDIOLOGIST

## 2021-09-09 PROCEDURE — 99207 PR NO CHARGE LOS: CPT | Performed by: AUDIOLOGIST

## 2021-09-09 PROCEDURE — 92557 COMPREHENSIVE HEARING TEST: CPT | Performed by: AUDIOLOGIST

## 2021-09-09 PROCEDURE — 99203 OFFICE O/P NEW LOW 30 MIN: CPT | Performed by: OTOLARYNGOLOGY

## 2021-09-09 SDOH — HEALTH STABILITY - PHYSICAL HEALTH: OCCUPATIONAL EXPOSURE TO NOISE: Z57.0

## 2021-09-09 NOTE — LETTER
9/9/2021         RE: Amado Veliz  [address] [facility] [address]        Dear Colleague,    Thank you for referring your patient, Amado Veliz, to the [facility]. Please see a copy of my visit note below.    CHIEF COMPLAINT:  Hearing Concern      HISTORY OF PRESENT ILLNESS    Bassam was seen at the behest of Ildefonso Lucas MD for tinnitus.   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   Tinnitus in right ear for 13 years, left ear 1-2 after stent placement.        REVIEW OF SYSTEMS    Review of Systems as per HPI and PMHx, otherwise 10 system review system are negative.     HEAD: Normal appearance and symmetry:  No cutaneous lesions.      NECK:  supple     EARS: normal TM, EACs; small monomeric portion right TM     NOSE:     Dorsum:   straight  Septum:  midline  Mucosa:  moist          ORAL CAVITY/OROPHARYNX:     Lips:  Normal.  Tongue: normal, midline  Mucosa:   no lesions    NECK:  Trachea:  midline.              Thyroid:  normal              Adenopathy:  none        NEURO:   Alert and Oriented     GAIT AND STATION:  normal     RESPIRATORY:   Symmetry and Respiratory effort     PSYCH:  Normal mood and affect     SKIN:   warm and dry         IMPRESSION:    Encounter Diagnoses   Name Primary?     Tinnitus, bilateral Yes     Sensorineural hearing loss (SNHL) of both ears      Occupational exposure to noise           RECOMMENDATIONS:    Discussed pathophysiology of tinnitus and masking strategies with patient.  Tinnitus information sheet supplied to patient.   All questions were answered.         Again, thank you for allowing me to participate in the care of your patient.        Sincerely,        Lex Lehman MD

## 2021-09-09 NOTE — PROGRESS NOTES
CHIEF COMPLAINT:  Hearing Concern      HISTORY OF PRESENT ILLNESS    Bassam was seen at the behest of Ildefonso Lucas MD for tinnitus.   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   Tinnitus in right ear for 13 years, left ear 1-2 after stent placement.        REVIEW OF SYSTEMS    Review of Systems as per HPI and PMHx, otherwise 10 system review system are negative.     HEAD: Normal appearance and symmetry:  No cutaneous lesions.      NECK:  supple     EARS: normal TM, EACs; small monomeric portion right TM     NOSE:     Dorsum:   straight  Septum:  midline  Mucosa:  moist          ORAL CAVITY/OROPHARYNX:     Lips:  Normal.  Tongue: normal, midline  Mucosa:   no lesions    NECK:  Trachea:  midline.              Thyroid:  normal              Adenopathy:  none        NEURO:   Alert and Oriented     GAIT AND STATION:  normal     RESPIRATORY:   Symmetry and Respiratory effort     PSYCH:  Normal mood and affect     SKIN:   warm and dry         IMPRESSION:    Encounter Diagnoses   Name Primary?     Tinnitus, bilateral Yes     Sensorineural hearing loss (SNHL) of both ears      Occupational exposure to noise           RECOMMENDATIONS:    Discussed pathophysiology of tinnitus and masking strategies with patient.  Tinnitus information sheet supplied to patient.   All questions were answered.

## 2021-09-09 NOTE — PROGRESS NOTES
AUDIOLOGY REPORT    SUMMARY: Audiology visit completed. See audiogram for results. Abuse screening not completed due to same day appt with ENT clinic, where this is addressed.      RECOMMENDATIONS: Follow-up with ENT.      Concetta Khalil, [facility]  [place] Licensed Audiologist [id number]

## 2021-09-09 NOTE — PATIENT INSTRUCTIONS
Patient Education     Tinnitus (Ringing in the Ears)     Treatment may include maskers and hearing aids.   Tinnitus is the term for a noise in your ear not caused by an outside sound. The noise might be a ringing, clicking, hiss, or roar. It can vary in pitch. It may be soft or very loud. For some people, this is a minor problem. But for others, the noise can make it hard to hear, work, and even sleep. When tinnitus can't be cured, treatments may help.  What causes tinnitus?  Loud noises, hearing loss, and earwax can cause tinnitus. So can certain medicines. Large amounts of aspirin or caffeine are sometimes to blame. In many cases, the exact cause of tinnitus is not known.  How is tinnitus treated?  Finding and removing the cause is the best way to treat tinnitus. So your healthcare provider may refer you to an ear, nose, and throat doctor (ENT or otolaryngologist). Your hearing may also be checked by a hearing specialist (audiologist). If you have hearing loss, wearing a hearing aid may help your tinnitus. When the cause can't be found, the tinnitus itself may be treated. Some of the treatments are listed below. Your healthcare provider can tell you more about them:    Maskers. These are small devices that look like hearing aids. They have a pleasant sound that helps cover up the ringing in your ears. Hearing aids and maskers are sometimes used together.    Medicines that treat anxiety and depression. These may ease tinnitus in some people.    Hypnosis or relaxation therapy. This may help head noise seem less severe.    Tinnitus retraining therapy. This combines counseling and maskers. Both can help take your mind off the tinnitus.  To learn more    American Speech-Hearing-Language Association [phone] www.sameer.org    American Tinnitus Association [phone] www.lizy.org    National [place] on Deafness and other Communication Disorders [phone] www.nidcd.nih.gov/    [employer] last reviewed this  educational content on 9/1/2019 2000-2021 [employer]. All rights reserved. This information is not intended as a substitute for professional medical care. Always follow your healthcare professional's instructions.         Patient Education     How Hearing Aids Can Help You     Losing your hearing can be frustrating. But hearing aids can help you hear what you ve been missing. Not everyone who has hearing loss needs hearing aids. Hearing aids will most likely help you if your hearing loss:    Keeps you from communicating with others    Prevents you from hearing warning sounds such as a car horn  What hearing aids do  After getting used to your new hearing aids, you may find that:    You hear and understand speech better in many cases    You re able to join in when talking with a group of people    You hear certain speech sounds more clearly    You can hear warning signs that help you stay safe, such as a smoke alarm or car horn    Life is more enjoyable for you and the people around you  How hearing aids help you hear  Hearing aids help by making most sounds clearer for the brain. Sounds that you can t hear as well are made louder. Hearing aids also filter sound to reduce some background noise. And they soften some sounds that may be too loud. As a result, signals traveling to the brain are easier to understand.  The microphone picks up sound and carries it into the hearing aid. The amplifier makes the sound louder and clearer. The  sends this stronger sound into the ear canal. The stronger sound travels the rest of the way into the ear to the brain.       New technology has made today s hearing aids better than ever. To be sure you have the hearing aids best suited to your needs, have an evaluation by a licensed audiologist. Your hearing still won t be perfect. You may not hear all sounds. And you won t hear only the things you want to. In noisy places you may still have trouble hearing  speech clearly. But you can also learn methods for better listening. These methods, used together with hearing aids, will help you understand what s happening around you much better.  Mitch last reviewed this educational content on 9/1/2019 2000-2021 [employer]. All rights reserved. This information is not intended as a substitute for professional medical care. Always follow your healthcare professional's instructions.

## 2021-09-20 ENCOUNTER — ALLIED HEALTH/NURSE VISIT (OUTPATIENT)
Dept: FAMILY MEDICINE | Facility: CLINIC | Age: 61
End: 2021-09-20
Payer: COMMERCIAL

## 2021-09-20 DIAGNOSIS — Z23 ENCOUNTER FOR IMMUNIZATION: Primary | ICD-10-CM

## 2021-09-20 PROCEDURE — 90471 IMMUNIZATION ADMIN: CPT

## 2021-09-20 PROCEDURE — 90750 HZV VACC RECOMBINANT IM: CPT

## 2021-09-20 PROCEDURE — 99207 PR NO CHARGE NURSE ONLY: CPT

## 2021-09-20 NOTE — PROGRESS NOTES
Prior to immunization administration, verified patients identity using patient s name and date of birth. Please see Immunization Activity for additional information.     Screening Questionnaire for Adult Immunization    Are you sick today?   No   Do you have allergies to medications, food, a vaccine component or latex?   No   Have you ever had a serious reaction after receiving a vaccination?   No   Do you have a long-term health problem with heart, lung, kidney, or metabolic disease (e.g., diabetes), asthma, a blood disorder, no spleen, complement component deficiency, a cochlear implant, or a spinal fluid leak?  Are you on long-term aspirin therapy?   No   Do you have cancer, leukemia, HIV/AIDS, or any other immune system problem?   No   Do you have a parent, brother, or sister with an immune system problem?   No   In the past 3 months, have you taken medications that affect  your immune system, such as prednisone, other steroids, or anticancer drugs; drugs for the treatment of rheumatoid arthritis, Crohn s disease, or psoriasis; or have you had radiation treatments?   No   Have you had a seizure, or a brain or other nervous system problem?   No   During the past year, have you received a transfusion of blood or blood    products, or been given immune (gamma) globulin or antiviral drug?   No   For women: Are you pregnant or is there a chance you could become       pregnant during the next month?   No   Have you received any vaccinations in the past 4 weeks?   No     Immunization questionnaire answers were all negative.      Yasmin Arteaga LPN

## 2021-10-13 ENCOUNTER — TRANSFERRED RECORDS (OUTPATIENT)
Dept: HEALTH INFORMATION MANAGEMENT | Facility: CLINIC | Age: 61
End: 2021-10-13

## 2021-10-23 ENCOUNTER — HEALTH MAINTENANCE LETTER (OUTPATIENT)
Age: 61
End: 2021-10-23

## 2021-10-26 ENCOUNTER — MYC MEDICAL ADVICE (OUTPATIENT)
Dept: INTERNAL MEDICINE | Facility: CLINIC | Age: 61
End: 2021-10-26

## 2021-10-26 DIAGNOSIS — I20.0 UNSTABLE ANGINA (H): ICD-10-CM

## 2021-10-27 RX ORDER — ATORVASTATIN CALCIUM 20 MG/1
20 TABLET, FILM COATED ORAL EVERY EVENING
Qty: 90 TABLET | Refills: 0 | Status: SHIPPED | OUTPATIENT
Start: 2021-10-27 | End: 2022-01-24

## 2021-10-27 NOTE — TELEPHONE ENCOUNTER
"Per patient's Startcappst message below, needs refill on Atorvastatin.    Requested Prescriptions   Pending Prescriptions Disp Refills     atorvastatin (LIPITOR) 20 MG tablet 90 tablet 3     Sig: Take 1 tablet (20 mg) by mouth every evening       Statins Protocol Failed - 10/27/2021  8:11 AM        Failed - LDL on file in past 12 months     Recent Labs   Lab Test 10/19/20  0704   LDL 66             Passed - No abnormal creatine kinase in past 12 months     No lab results found.             Passed - Recent (12 mo) or future (30 days) visit within the authorizing provider's specialty     Patient has had an office visit with the authorizing provider or a provider within the authorizing providers department within the previous 12 mos or has a future within next 30 days. See \"Patient Info\" tab in inbasket, or \"Choose Columns\" in Meds & Orders section of the refill encounter.              Passed - Medication is active on med list        Passed - Patient is age 18 or older             Last office visit 9-8-21    Future lab orders in chart.  Medication refilled x 1 and patient informed via Startcappst to schedule a fasting lab appointment.      "

## 2021-10-28 ENCOUNTER — ALLIED HEALTH/NURSE VISIT (OUTPATIENT)
Dept: PEDIATRICS | Facility: CLINIC | Age: 61
End: 2021-10-28
Payer: COMMERCIAL

## 2021-10-28 DIAGNOSIS — Z23 NEED FOR PROPHYLACTIC VACCINATION AND INOCULATION AGAINST INFLUENZA: Primary | ICD-10-CM

## 2021-10-28 PROCEDURE — 90471 IMMUNIZATION ADMIN: CPT

## 2021-10-28 PROCEDURE — 90682 RIV4 VACC RECOMBINANT DNA IM: CPT

## 2021-10-28 PROCEDURE — 99207 PR NO CHARGE NURSE ONLY: CPT

## 2021-11-03 ENCOUNTER — LAB (OUTPATIENT)
Dept: LAB | Facility: CLINIC | Age: 61
End: 2021-11-03
Payer: COMMERCIAL

## 2021-11-03 DIAGNOSIS — E78.5 HYPERLIPIDEMIA LDL GOAL <70: ICD-10-CM

## 2021-11-03 DIAGNOSIS — Z12.5 SCREENING PSA (PROSTATE SPECIFIC ANTIGEN): ICD-10-CM

## 2021-11-03 LAB
ALBUMIN SERPL-MCNC: 3.6 G/DL (ref 3.4–5)
ALP SERPL-CCNC: 56 U/L (ref 40–150)
ALT SERPL W P-5'-P-CCNC: 31 U/L (ref 0–70)
ANION GAP SERPL CALCULATED.3IONS-SCNC: 5 MMOL/L (ref 3–14)
AST SERPL W P-5'-P-CCNC: 21 U/L (ref 0–45)
BILIRUB SERPL-MCNC: 0.5 MG/DL (ref 0.2–1.3)
BUN SERPL-MCNC: 13 MG/DL (ref 7–30)
CALCIUM SERPL-MCNC: 8.9 MG/DL (ref 8.5–10.1)
CHLORIDE BLD-SCNC: 108 MMOL/L (ref 94–109)
CHOLEST SERPL-MCNC: 140 MG/DL
CO2 SERPL-SCNC: 24 MMOL/L (ref 20–32)
CREAT SERPL-MCNC: 0.74 MG/DL (ref 0.66–1.25)
FASTING STATUS PATIENT QL REPORTED: YES
GFR SERPL CREATININE-BSD FRML MDRD: >90 ML/MIN/1.73M2
GLUCOSE BLD-MCNC: 102 MG/DL (ref 70–99)
HDLC SERPL-MCNC: 35 MG/DL
LDLC SERPL CALC-MCNC: 85 MG/DL
NONHDLC SERPL-MCNC: 105 MG/DL
POTASSIUM BLD-SCNC: 4.3 MMOL/L (ref 3.4–5.3)
PROT SERPL-MCNC: 7 G/DL (ref 6.8–8.8)
PSA SERPL-MCNC: 1.08 UG/L (ref 0–4)
SODIUM SERPL-SCNC: 137 MMOL/L (ref 133–144)
TRIGL SERPL-MCNC: 100 MG/DL

## 2021-11-03 PROCEDURE — 80053 COMPREHEN METABOLIC PANEL: CPT

## 2021-11-03 PROCEDURE — 36415 COLL VENOUS BLD VENIPUNCTURE: CPT

## 2021-11-03 PROCEDURE — 80061 LIPID PANEL: CPT

## 2021-11-03 PROCEDURE — G0103 PSA SCREENING: HCPCS

## 2021-11-23 ENCOUNTER — MYC MEDICAL ADVICE (OUTPATIENT)
Dept: INTERNAL MEDICINE | Facility: CLINIC | Age: 61
End: 2021-11-23
Payer: COMMERCIAL

## 2021-11-23 DIAGNOSIS — F41.1 GAD (GENERALIZED ANXIETY DISORDER): ICD-10-CM

## 2021-11-23 RX ORDER — SERTRALINE HYDROCHLORIDE 100 MG/1
200 TABLET, FILM COATED ORAL DAILY
Qty: 60 TABLET | Refills: 2 | Status: SHIPPED | OUTPATIENT
Start: 2021-11-23 | End: 2022-02-16

## 2021-12-02 ENCOUNTER — ALLIED HEALTH/NURSE VISIT (OUTPATIENT)
Dept: PEDIATRICS | Facility: CLINIC | Age: 61
End: 2021-12-02
Payer: COMMERCIAL

## 2021-12-02 DIAGNOSIS — Z23 NEED FOR VACCINATION: Primary | ICD-10-CM

## 2021-12-02 PROCEDURE — 90750 HZV VACC RECOMBINANT IM: CPT

## 2021-12-02 PROCEDURE — 90471 IMMUNIZATION ADMIN: CPT

## 2021-12-02 NOTE — PROGRESS NOTES
Prior to immunization administration, verified patients identity using patient s name and date of birth. Please see Immunization Activity for additional information.     Screening Questionnaire for Adult Immunization    Are you sick today?   No   Do you have allergies to medications, food, a vaccine component or latex?   No   Have you ever had a serious reaction after receiving a vaccination?   No   Do you have a long-term health problem with heart, lung, kidney, or metabolic disease (e.g., diabetes), asthma, a blood disorder, no spleen, complement component deficiency, a cochlear implant, or a spinal fluid leak?  Are you on long-term aspirin therapy?   No   Do you have cancer, leukemia, HIV/AIDS, or any other immune system problem?   No   Do you have a parent, brother, or sister with an immune system problem?   No   In the past 3 months, have you taken medications that affect  your immune system, such as prednisone, other steroids, or anticancer drugs; drugs for the treatment of rheumatoid arthritis, Crohn s disease, or psoriasis; or have you had radiation treatments?   No   Have you had a seizure, or a brain or other nervous system problem?   No   During the past year, have you received a transfusion of blood or blood    products, or been given immune (gamma) globulin or antiviral drug?   No   For women: Are you pregnant or is there a chance you could become       pregnant during the next month?   No   Have you received any vaccinations in the past 4 weeks?   No     Immunization questionnaire answers were all negative.        Per orders of Dr. Lucas injection of  Shingrix given by Linda Hanley MA. Patient instructed to remain in clinic for 15 minutes afterwards, and to report any adverse reaction to me immediately.       Screening performed by Linda Hanley MA on 12/2/2021 at 8:21 AM.

## 2022-01-22 DIAGNOSIS — I20.0 UNSTABLE ANGINA (H): ICD-10-CM

## 2022-01-24 RX ORDER — ATORVASTATIN CALCIUM 20 MG/1
TABLET, FILM COATED ORAL
Qty: 90 TABLET | Refills: 2 | Status: SHIPPED | OUTPATIENT
Start: 2022-01-24 | End: 2022-10-31

## 2022-02-12 ENCOUNTER — HEALTH MAINTENANCE LETTER (OUTPATIENT)
Age: 62
End: 2022-02-12

## 2022-02-15 DIAGNOSIS — F41.1 GAD (GENERALIZED ANXIETY DISORDER): ICD-10-CM

## 2022-02-16 RX ORDER — SERTRALINE HYDROCHLORIDE 100 MG/1
TABLET, FILM COATED ORAL
Qty: 60 TABLET | Refills: 6 | Status: SHIPPED | OUTPATIENT
Start: 2022-02-16 | End: 2022-09-22

## 2022-02-16 NOTE — TELEPHONE ENCOUNTER
Pending Prescriptions:                       Disp   Refills    sertraline (ZOLOFT) 100 MG tablet [Pharma*60 tab*6            Sig: TAKE 2 TABLETS(200 MG) BY MOUTH DAILY      Prescription approved per [facility] Refill Protocol.      Asha HARVEY RN   [facility]

## 2022-06-08 DIAGNOSIS — R07.89 ATYPICAL CHEST PAIN: ICD-10-CM

## 2022-06-09 RX ORDER — METOPROLOL SUCCINATE 25 MG/1
TABLET, EXTENDED RELEASE ORAL
Qty: 90 TABLET | Refills: 0 | Status: SHIPPED | OUTPATIENT
Start: 2022-06-09 | End: 2022-12-14

## 2022-06-09 NOTE — TELEPHONE ENCOUNTER
Pending Prescriptions:                       Disp   Refills    metoprolol succinate ER (TOPROL XL) 25 MG*90 tab*0            Sig: TAKE ONE-HALF TABLET BY MOUTH DAILY      Medication is being filled for 1 time refill only due to:  pt has a future appt scheduled.

## 2022-06-15 ENCOUNTER — VIRTUAL VISIT (OUTPATIENT)
Dept: INTERNAL MEDICINE | Facility: CLINIC | Age: 62
End: 2022-06-15
Payer: COMMERCIAL

## 2022-06-15 DIAGNOSIS — F41.9 ANXIETY: Primary | ICD-10-CM

## 2022-06-15 DIAGNOSIS — E78.5 HYPERLIPIDEMIA LDL GOAL <100: ICD-10-CM

## 2022-06-15 DIAGNOSIS — Z11.4 SCREENING FOR HIV (HUMAN IMMUNODEFICIENCY VIRUS): ICD-10-CM

## 2022-06-15 DIAGNOSIS — R53.83 FATIGUE, UNSPECIFIED TYPE: ICD-10-CM

## 2022-06-15 DIAGNOSIS — Z11.59 NEED FOR HEPATITIS C SCREENING TEST: ICD-10-CM

## 2022-06-15 DIAGNOSIS — I25.10 CORONARY ARTERY DISEASE INVOLVING NATIVE CORONARY ARTERY OF NATIVE HEART WITHOUT ANGINA PECTORIS: ICD-10-CM

## 2022-06-15 PROCEDURE — 99214 OFFICE O/P EST MOD 30 MIN: CPT | Mod: 95 | Performed by: INTERNAL MEDICINE

## 2022-06-15 NOTE — PATIENT INSTRUCTIONS
"Try reducing your sertraline in 50 mg increments every month.  Take 150 mg (one and one-half tablets) daily now through mid-July, then   Take 100 mg (one tablet) daily from mid-July until mid-August.     Schedule another video visit with me for mid-August to compare notes on how things are going.     Okay to schedule a future fasting \"lab only\" appointment at any time--orders have been placed in the computer.     "

## 2022-06-15 NOTE — PROGRESS NOTES
"Bassam is a 62 year old who is being evaluated via a billable video visit.      How would you like to obtain your AVS? MyChart  If the video visit is dropped, the invitation should be resent by: Text to cell phone: [phone]  Will anyone else be joining your video visit? No     Video Start Time: 1058    Assessment & Plan   (F41.9) Anxiety  (primary encounter diagnosis)  Comment: Improved. Agreed to taper plan for sertraline. See pt instructions and epic orders.     (I25.10) Coronary artery disease involving native coronary artery of native heart without angina pectoris  Comment: No recent symptoms of unstable angina. F/u with cardiology as recommended.     (E78.5) Hyperlipidemia LDL goal <100  Comment: Update lipids with future labs.   Plan: Lipid panel reflex to direct LDL Fasting,         Comprehensive metabolic panel (BMP + Alb, Alk         Phos, ALT, AST, Total. Bili, TP)          (R53.83) Fatigue, unspecified type  Comment: See epic orders.   Plan: TSH with free T4 reflex, CBC with platelets          (Z11.4) Screening for HIV (human immunodeficiency virus  Plan: **HIV Antigen Antibody Combo FUTURE 2mo          (Z11.59) Need for hepatitis C screening test  Plan: Hepatitis C Screen Reflex to HCV RNA Quant and         Genotype             BMI:   Estimated body mass index is 30.3 kg/m  as calculated from the following:    Height as of 9/8/21: 1.88 m (6' 2\").    Weight as of 9/8/21: 107 kg (236 lb).   Weight management plan: Discussed healthy diet and exercise guidelines    Patient Instructions   Try reducing your sertraline in 50 mg increments every month.  Take 150 mg (one and one-half tablets) daily now through mid-July, then   Take 100 mg (one tablet) daily from mid-July until mid-August.     Schedule another video visit with me for mid-August to compare notes on how things are going.     Okay to schedule a future fasting \"lab only\" appointment at any time--orders have been placed in the computer. " "        Return in about 2 months (around 8/15/2022) for video visit.    Ildefonso Lucas MD,   [facility] TONY Banegas is a 62 year old who presents for the following health issues : Patient would like to taper off 200 mg of Zoloft.    History of Present Illness       Reason for visit:  Begin taper off Sertraline    He eats 2-3 servings of fruits and vegetables daily.He consumes 0 sweetened beverage(s) daily.He exercises with enough effort to increase his heart rate 30 to 60 minutes per day.  He exercises with enough effort to increase his heart rate 4 days per week.   He is taking medications regularly.     The patient acknowledges receiving benefit from sertraline as an anxiolytic when experiencing atypical chest pain superimposed upon known coronary artery disease and angina.   He has also benefitted from prior \"talk therapy\" with Nitin Reno.     He has for some time been interested in tapering himself off of sertraline. At his last visit with his mental health prescriber, Ivon Cornell on May 26, 2021, he was urged to defer this taper for at least a year.     Now that a year has passed, he feels as though his anxiety level is much improved and that he has come to understand that he has some degree of chronic chest discomfort that does not represent unstable angina. He would like to taper off of sertraline now.     We discussed a reasonable taper plan. He will continue to follow with cardiology as they advise.     He also is following with GI due to eosinophilic esophagitis.     He has dropped a heavy object on one of his toenails some time ago, and has noted some changes in its appearance that made him wonder whether he might have a fungal infection. However, these changes are isolated to the one nail, and new nail growth at the nail bed appears more normal in appearance.     Past medical, family and social histories as well as medications reviewed and updated as needed.    Review " of Systems   REVIEW OF SYSTEMS: The following systems have been completely reviewed and are negative except as noted above:   Constitutional, respiratory, cardiovascular, musculoskeletal, dermatologic, psychiatric, and neurologic systems.        Objective           Vitals:  No vitals were obtained today due to virtual visit.    Physical Exam   GENERAL: Healthy, alert and no distress  EYES: Eyes grossly normal to inspection.  No discharge or erythema, or obvious scleral/conjunctival abnormalities.  RESP: No audible wheeze, cough, or visible cyanosis.  No visible retractions or increased work of breathing.    SKIN: Visible skin clear. No significant rash, abnormal pigmentation or lesions.  NEURO: Cranial nerves grossly intact.  Mentation and speech appropriate for age.  PSYCH: Mentation appears normal, affect normal/bright, judgement and insight intact, normal speech and appearance well-groomed.          Video-Visit Details    Type of service:  Video Visit    Video End Time: 1123    Originating Location (pt. Location): Home    Distant Location (provider location):  [facility]     Platform used for Video Visit: Farmeron

## 2022-07-29 ENCOUNTER — OFFICE VISIT (OUTPATIENT)
Dept: URGENT CARE | Facility: URGENT CARE | Age: 62
End: 2022-07-29
Payer: COMMERCIAL

## 2022-07-29 VITALS
OXYGEN SATURATION: 99 % | SYSTOLIC BLOOD PRESSURE: 135 MMHG | HEART RATE: 63 BPM | DIASTOLIC BLOOD PRESSURE: 75 MMHG | TEMPERATURE: 97.2 F

## 2022-07-29 DIAGNOSIS — H00.025 HORDEOLUM INTERNUM OF LEFT LOWER EYELID: Primary | ICD-10-CM

## 2022-07-29 PROCEDURE — 99213 OFFICE O/P EST LOW 20 MIN: CPT | Performed by: PHYSICIAN ASSISTANT

## 2022-07-29 RX ORDER — ERYTHROMYCIN 5 MG/G
0.5 OINTMENT OPHTHALMIC AT BEDTIME
Qty: 3.5 G | Refills: 0 | Status: SHIPPED | OUTPATIENT
Start: 2022-07-29 | End: 2022-08-03

## 2022-07-29 NOTE — PATIENT INSTRUCTIONS
Warm compresses to the lid three to four times per day   Use eye ointment before bed (may blur your vision)  Systane lubricating drops two times per day to help with dry eye

## 2022-07-29 NOTE — PROGRESS NOTES
Assessment & Plan     1. Hordeolum internum of left lower eyelid  Examination of left eye is consistent with a stye.  Romycin ointment used before bed to reduce bacterial load.  Encouraged warm compresses 3-4 times a day.  Discussed if not improving as expected within 3 weeks, follow-up with ophthalmology.    - erythromycin (ROMYCIN) 5 MG/GM ophthalmic ointment; Place 0.5 inches Into the left eye At Bedtime for 5 days  Dispense: 3.5 g; Refill: 0      Return in about 2 weeks (around 8/12/2022) for Ophthamology .    Diagnosis and treatment plan was reviewed with patient and/or family.   We went over any labs or imaging. Discussed worsening symptoms or little to no relief despite treatment plan to follow-up with PCP or return to clinic.  Patient verbalizes understanding. All questions were addressed and answered.     Mirela Joaquin PA-C  [facility]    CHIEF COMPLAINT:   Chief Complaint   Patient presents with     Eye Problem     Poss eye infection. Dark red rim under his eye.. kind of  itchy      Subjective     Bassam is a 62 year old male who presents to clinic today for evaluation of eye problem.  Symptoms have been present for the past 4 to 5 days. Slight swelling of the lower lid. Notices some injection in his left eye.  Has not tried anything.  Patient denies fever, chills, eye pain,  eye drainage, change of vision, Foreign body sensation, photophobia, periorbital erythema or headache.         Past Medical History:   Diagnosis Date     Anxiety      CAD (coronary artery disease)     Status post stenting of proximal right coronary artery complex ruptured plaque on 23 April 2020.     Hyperlipidemia LDL goal <70      Intestinal infection due to Clostridium difficile      Pharyngoesophageal dysphagia      Past Surgical History:   Procedure Laterality Date     BACK SURGERY      L5-S1 disk herniation      CV CORONARY ANGIOGRAM N/A 4/23/2020    Procedure: Coronary Angiogram;  Surgeon: Aaron  Derek Mclean MD;  Location:  HE[facility]ARD[facility]ATH [facility]     CV INTRAVASULAR ULTRASOUND N/A 2020    Procedure: Intravascular Ultrasound;  Surgeon: Derek Wakler MD;  Location:  HE[facility]ARD[facility] CATH LAB     CV PCI STENT DRUG ELUTING       CV PCI STENT DRUG ELUTING N/A 2020    Procedure: Percutaneous Coronary Intervention Stent Drug Eluting;  Surgeon: Derek Walker MD;  Location:  HEART CARDIAC CATH LAB     Social History     Tobacco Use     Smoking status: Former Smoker     Packs/day: 1.00     Years: 15.00     Pack years: 15.00     Types: Cigarettes     Start date:      Quit date: 3/17/1993     Years since quittin.3     Smokeless tobacco: Never Used   Substance Use Topics     Alcohol use: No     Current Outpatient Medications   Medication     aspirin (ASA) 81 MG EC tablet     atorvastatin (LIPITOR) 20 MG tablet     erythromycin (ROMYCIN) 5 MG/GM ophthalmic ointment     metoprolol succinate ER (TOPROL XL) 25 MG 24 hr tablet     multivitamin w/minerals (THERA-VIT-M) tablet     sertraline (ZOLOFT) 100 MG tablet     nitroGLYcerin (NITROSTAT) 0.4 MG sublingual tablet     No current facility-administered medications for this visit.     Allergies   Allergen Reactions     Niacin Unknown     Patient gets flushed and red in the face.       10 point ROS of systems were all negative except for pertinent positives noted in my HPI.      Exam:   /75   Pulse 63   Temp 97.2  F (36.2  C)   SpO2 99%   Constitutional: healthy, alert and no distress  Head: Normocephalic, atraumatic.  Eyes: conjunctiva slightly injected on the left with an internal hordeolum on the lower lid  ENT: TMs clear and shiny mendez, nasal mucosa pink and moist  Neck: neck is supple, no cervical lymphadenopathy or nuchal rigidity  Cardiovascular: RRR  Respiratory: CTA bilaterally, no rhonchi or rales  Skin: no rashes  Neurologic: Speech clear, gait normal. Moves all extremities.    No results found for any visits on  07/29/22.

## 2022-08-18 ENCOUNTER — OFFICE VISIT (OUTPATIENT)
Dept: URGENT CARE | Facility: URGENT CARE | Age: 62
End: 2022-08-18
Payer: COMMERCIAL

## 2022-08-18 VITALS
DIASTOLIC BLOOD PRESSURE: 73 MMHG | SYSTOLIC BLOOD PRESSURE: 119 MMHG | TEMPERATURE: 96.8 F | HEART RATE: 62 BPM | OXYGEN SATURATION: 98 %

## 2022-08-18 DIAGNOSIS — H00.015 HORDEOLUM OF LEFT LOWER EYELID, UNSPECIFIED HORDEOLUM TYPE: Primary | ICD-10-CM

## 2022-08-18 PROCEDURE — 99213 OFFICE O/P EST LOW 20 MIN: CPT | Performed by: FAMILY MEDICINE

## 2022-08-18 NOTE — PROGRESS NOTES
SUBJECTIVE:  Chief Complaint   Patient presents with     Urgent Care     Possible stye on left side of the eye which started three weeks ago and has not gotten better. Pt requested stronger antibiotic.     Amado Veliz is a 62 year old male who presents with a chief complaint of left stye for 3 weeks.    Seen in UC on , RX erythromycin ointment given, has been using for the past 3 weeks without resolution.    Is using warm compress twice a day.  Has gotten a little better but has worsen again.  Endorsed mild mattering in the morning.  No vision changes    Past Medical History:   Diagnosis Date     Anxiety      CAD (coronary artery disease)     Status post stenting of proximal right coronary artery complex ruptured plaque on 2020.     Hyperlipidemia LDL goal <70      Intestinal infection due to Clostridium difficile      Pharyngoesophageal dysphagia      Current Outpatient Medications   Medication Sig Dispense Refill     aspirin (ASA) 81 MG EC tablet Take 1 tablet (81 mg) by mouth daily 90 tablet 3     atorvastatin (LIPITOR) 20 MG tablet TAKE 1 TABLET(20 MG) BY MOUTH EVERY EVENING 90 tablet 2     metoprolol succinate ER (TOPROL XL) 25 MG 24 hr tablet TAKE ONE-HALF TABLET BY MOUTH DAILY 90 tablet 0     multivitamin w/minerals (THERA-VIT-M) tablet Take 1 tablet by mouth daily       nitroGLYcerin (NITROSTAT) 0.4 MG sublingual tablet Place 1 tablet (0.4 mg) under the tongue every 5 minutes as needed for chest pain For chest pain place 1 tablet under the tongue every 5 minutes for 3 doses. If symptoms persist 5 minutes after 1st dose call 911. 30 tablet 0     sertraline (ZOLOFT) 100 MG tablet TAKE 2 TABLETS(200 MG) BY MOUTH DAILY 60 tablet 6     Social History     Tobacco Use     Smoking status: Former Smoker     Packs/day: 1.00     Years: 15.00     Pack years: 15.00     Types: Cigarettes     Start date:      Quit date: 3/17/1993     Years since quittin.4     Smokeless tobacco: Never Used    Substance Use Topics     Alcohol use: No       ROS:  Review of systems negative except as stated above.    EXAM:   /73 (BP Location: Right arm, Patient Position: Sitting, Cuff Size: Adult Regular)   Pulse 62   Temp 96.8  F (36  C) (Tympanic)   SpO2 98%   GENERAL APPEARANCE: healthy, alert and no distress  EYES: PERRL, EOM intact, conjunctiva clear, left lower eyelid internal sty  PSYCH:alert, affect bright      ASSESSMENT/PLAN:  (H00.015) Hordeolum of left lower eyelid, unspecified hordeolum type  (primary encounter diagnosis)  Comment: persistent  Plan: amoxicillin-clavulanate (AUGMENTIN) 875-125 MG         tablet            Encourage warm compress, artificial lubricating tears.  Due to persistent symptoms, will given oral antibiotic - RX Augmentin given.    Follow up with eye clinic if no resolution after antibiotic use.    Osvaldo Benz MD  August 18, 2022 11:36 AM               show

## 2022-09-13 ENCOUNTER — LAB (OUTPATIENT)
Dept: LAB | Facility: CLINIC | Age: 62
End: 2022-09-13
Payer: COMMERCIAL

## 2022-09-13 DIAGNOSIS — R53.83 FATIGUE, UNSPECIFIED TYPE: ICD-10-CM

## 2022-09-13 DIAGNOSIS — Z11.4 SCREENING FOR HIV (HUMAN IMMUNODEFICIENCY VIRUS): ICD-10-CM

## 2022-09-13 DIAGNOSIS — E78.5 HYPERLIPIDEMIA LDL GOAL <100: ICD-10-CM

## 2022-09-13 DIAGNOSIS — Z11.59 NEED FOR HEPATITIS C SCREENING TEST: ICD-10-CM

## 2022-09-13 LAB
ALBUMIN SERPL-MCNC: 3.8 G/DL (ref 3.4–5)
ALP SERPL-CCNC: 48 U/L (ref 40–150)
ALT SERPL W P-5'-P-CCNC: 27 U/L (ref 0–70)
ANION GAP SERPL CALCULATED.3IONS-SCNC: 9 MMOL/L (ref 3–14)
AST SERPL W P-5'-P-CCNC: 19 U/L (ref 0–45)
BILIRUB SERPL-MCNC: 0.7 MG/DL (ref 0.2–1.3)
BUN SERPL-MCNC: 11 MG/DL (ref 7–30)
CALCIUM SERPL-MCNC: 9 MG/DL (ref 8.5–10.1)
CHLORIDE BLD-SCNC: 108 MMOL/L (ref 94–109)
CHOLEST SERPL-MCNC: 117 MG/DL
CO2 SERPL-SCNC: 24 MMOL/L (ref 20–32)
CREAT SERPL-MCNC: 0.61 MG/DL (ref 0.66–1.25)
ERYTHROCYTE [DISTWIDTH] IN BLOOD BY AUTOMATED COUNT: 12.2 % (ref 10–15)
FASTING STATUS PATIENT QL REPORTED: YES
GFR SERPL CREATININE-BSD FRML MDRD: >90 ML/MIN/1.73M2
GLUCOSE BLD-MCNC: 108 MG/DL (ref 70–99)
HCT VFR BLD AUTO: 42.2 % (ref 40–53)
HCV AB SERPL QL IA: NONREACTIVE
HDLC SERPL-MCNC: 32 MG/DL
HGB BLD-MCNC: 14.8 G/DL (ref 13.3–17.7)
HIV 1+2 AB+HIV1 P24 AG SERPL QL IA: NONREACTIVE
LDLC SERPL CALC-MCNC: 57 MG/DL
MCH RBC QN AUTO: 31.4 PG (ref 26.5–33)
MCHC RBC AUTO-ENTMCNC: 35.1 G/DL (ref 31.5–36.5)
MCV RBC AUTO: 90 FL (ref 78–100)
NONHDLC SERPL-MCNC: 85 MG/DL
PLATELET # BLD AUTO: 201 10E3/UL (ref 150–450)
POTASSIUM BLD-SCNC: 4 MMOL/L (ref 3.4–5.3)
PROT SERPL-MCNC: 6.8 G/DL (ref 6.8–8.8)
RBC # BLD AUTO: 4.71 10E6/UL (ref 4.4–5.9)
SODIUM SERPL-SCNC: 141 MMOL/L (ref 133–144)
TRIGL SERPL-MCNC: 139 MG/DL
TSH SERPL DL<=0.005 MIU/L-ACNC: 2.08 MU/L (ref 0.4–4)
WBC # BLD AUTO: 4 10E3/UL (ref 4–11)

## 2022-09-13 PROCEDURE — 86803 HEPATITIS C AB TEST: CPT

## 2022-09-13 PROCEDURE — 87389 HIV-1 AG W/HIV-1&-2 AB AG IA: CPT

## 2022-09-13 PROCEDURE — 80053 COMPREHEN METABOLIC PANEL: CPT

## 2022-09-13 PROCEDURE — 80061 LIPID PANEL: CPT

## 2022-09-13 PROCEDURE — 84443 ASSAY THYROID STIM HORMONE: CPT

## 2022-09-13 PROCEDURE — 36415 COLL VENOUS BLD VENIPUNCTURE: CPT

## 2022-09-13 PROCEDURE — 85027 COMPLETE CBC AUTOMATED: CPT

## 2022-09-22 ENCOUNTER — OFFICE VISIT (OUTPATIENT)
Dept: CARDIOLOGY | Facility: CLINIC | Age: 62
End: 2022-09-22
Payer: COMMERCIAL

## 2022-09-22 VITALS
HEART RATE: 65 BPM | WEIGHT: 240.2 LBS | OXYGEN SATURATION: 98 % | HEIGHT: 74 IN | DIASTOLIC BLOOD PRESSURE: 76 MMHG | SYSTOLIC BLOOD PRESSURE: 132 MMHG | BODY MASS INDEX: 30.83 KG/M2

## 2022-09-22 DIAGNOSIS — I25.10 CORONARY ARTERY DISEASE INVOLVING NATIVE CORONARY ARTERY OF NATIVE HEART WITHOUT ANGINA PECTORIS: Primary | ICD-10-CM

## 2022-09-22 DIAGNOSIS — E78.5 HYPERLIPIDEMIA LDL GOAL <70: ICD-10-CM

## 2022-09-22 PROCEDURE — 99214 OFFICE O/P EST MOD 30 MIN: CPT | Performed by: INTERNAL MEDICINE

## 2022-09-22 NOTE — LETTER
9/22/2022    Ildefonso Lucas MD, MD  [address] [facility] [address]  [facility] [address]    RE: Amado FORBES Jose Alfredo       Dear Colleague,     I had the pleasure of seeing Amado Veliz in the [facility].  .  HPI and Plan:   Today I had the pleasure of seeing Amado Veliz at [facility]. He is a pleasant 62 year old patient with a   Past medical history of coronary disease status post PCI of proximal RCA in 04/2020, hyperlipidemia, anxiety and remote tobacco abuse who presented to the clinic for a follow-up visit.    Please refer to my note from 3/11/2021 for full details.  I have been following the patient along with keyla Ruggiero.  Since undergoing coronary angiogram and PCI of the proximal RCA the patient continued to have atypical chest pain for which extensive work-up was pursued.  This included a nuclear stress test and then cardiac MRI with stress.  Both these tests failed to show any reversible ischemia.   Today he tells me that he has been feeling extremely well since his last visit with keyla Ruggiero in 09/2021.  The chest pain that he had previously reported on several occasions has completely resolved and he is able to now do activities of daily living and then some moderate exertion without any difficulty.  He believes that the symptoms were secondary to stress and anxiety and acknowledging that fact has made the difference.  He recently had blood work which showed LDL cholesterol of less than 70 mg/dL.  Echocardiogram in the past has shown normal ejection fraction and no wall motion abnormalities.  He walks over 3 miles a day, tries to eat low-sodium, Mediterranean style diet and takes all his medications as prescribed.  During the summer he did have a few episodes of postural hypotension which quickly resolved with hydration.  I reviewed nuclear stress test, echocardiogram, EKG and cardiac MRI and angiogram films today.    Assessment and plan  1.   Single-vessel coronary disease s/p PCI of proximal RCA in 04/2020  2.  Atypical chest pain-resolved  3.  Hyperlipidemia  4.  Anxiety    It was so nice to meet Mr. Núñez again in clinic today for a follow-up visit.  I am glad that his prior symptoms have completely resolved and he is doing extremely well.  He is doing all the right things by exercising regularly, eating a Mediterranean style diet and taking his medications as prescribed.  I do not believe we need to make any changes to his medical regimen today.  I will have him come and see us on a yearly basis but if things change I will be happy to see him earlier than that.  I do not believe need to order any tests today.    Thank you for allowing me to participate in the care of Amado Veliz    This note was completed in part using Dragon voice recognition software. Although reviewed after completion, some word and grammatical errors may occur.    Abelino Wayne MD  Cardiology    No orders of the defined types were placed in this encounter.      No orders of the defined types were placed in this encounter.      Medications Discontinued During This Encounter   Medication Reason     sertraline (ZOLOFT) 100 MG tablet Discontinued by another Health Care Provider       Encounter Diagnoses   Name Primary?     Coronary artery disease involving native coronary artery of native heart without angina pectoris Yes     Hyperlipidemia LDL goal <70        CURRENT MEDICATIONS:  Current Outpatient Medications   Medication Sig Dispense Refill     aspirin (ASA) 81 MG EC tablet Take 1 tablet (81 mg) by mouth daily 90 tablet 3     atorvastatin (LIPITOR) 20 MG tablet TAKE 1 TABLET(20 MG) BY MOUTH EVERY EVENING 90 tablet 2     metoprolol succinate ER (TOPROL XL) 25 MG 24 hr tablet TAKE ONE-HALF TABLET BY MOUTH DAILY 90 tablet 0     multivitamin w/minerals (THERA-VIT-M) tablet Take 1 tablet by mouth daily       nitroGLYcerin (NITROSTAT) 0.4 MG sublingual tablet Place 1 tablet  (0.4 mg) under the tongue every 5 minutes as needed for chest pain For chest pain place 1 tablet under the tongue every 5 minutes for 3 doses. If symptoms persist 5 minutes after 1st dose call 911. 30 tablet 0       ALLERGIES     Allergies   Allergen Reactions     Niacin Unknown     Patient gets flushed and red in the face.       PAST MEDICAL HISTORY:  Past Medical History:   Diagnosis Date     Anxiety      CAD (coronary artery disease)     Status post stenting of proximal right coronary artery complex ruptured plaque on 23 April 2020.     Hyperlipidemia LDL goal <70      Intestinal infection due to Clostridium difficile      Pharyngoesophageal dysphagia        PAST SURGICAL HISTORY:  Past Surgical History:   Procedure Laterality Date     BACK SURGERY      L5-S1 disk herniation      CV CORONARY ANGIOGRAM N/A 4/23/2020    Procedure: Coronary Angiogram;  Surgeon: Derek Walker MD;  Location: [facility] CARDIAC CATH LAB     CV INTRAVASULAR ULTRASOUND N/A 4/23/2020    Procedure: Intravascular Ultrasound;  Surgeon: Derek Walker MD;  Location: [facility] CARDIAC CATH LAB     CV PCI STENT DRUG ELUTING       CV PCI STENT DRUG ELUTING N/A 4/23/2020    Procedure: Percutaneous Coronary Intervention Stent Drug Eluting;  Surgeon: Derek Walker MD;  Location:  HEART CARDIAC CATH LAB       FAMILY HISTORY:  Family History   Problem Relation Age of Onset     Family History Negative Father      Deep Vein Thrombosis Father      Atrial fibrillation Father      Family History Negative Mother      Family History Negative Brother      Deep Vein Thrombosis Sister      Ovarian Cancer Maternal Grandmother      Coronary Artery Disease Maternal Grandfather        SOCIAL HISTORY:  Social History     Socioeconomic History     Marital status:      Spouse name: None     Number of children: 0     Years of education: None     Highest education level: High school graduate   Occupational History     Occupation:  "   Tobacco Use     Smoking status: Former Smoker     Packs/day: 1.00     Years: 15.00     Pack years: 15.00     Types: Cigarettes     Start date:      Quit date: 3/17/1993     Years since quittin.5     Smokeless tobacco: Never Used   Vaping Use     Vaping Use: Never used   Substance and Sexual Activity     Alcohol use: No     Drug use: No     Sexual activity: Yes     Partners: Female   Other Topics Concern     Seat Belt Yes   Social History Narrative    SD CHECKED REGULARLY.    GUNS UNLOADED IN CLOSET AT HOME.     Social Determinants of Health     Financial Resource Strain: Low Risk      Difficulty of Paying Living Expenses: Not hard at all   Food Insecurity: No Food Insecurity     Worried About Running Out of Food in the Last Year: Never true     Ran Out of Food in the Last Year: Never true   Transportation Needs: No Transportation Needs     Lack of Transportation (Medical): No     Lack of Transportation (Non-Medical): No   Intimate Partner Violence: Not At Risk     Fear of Current or Ex-Partner: No     Emotionally Abused: No     Physically Abused: No     Sexually Abused: No       Review of Systems:  Skin:  not assessed       Eyes:  not assessed      ENT:  not assessed      Respiratory:  Negative       Cardiovascular:    Positive for;fatigue;lightheadedness with exertion and in heat  Gastroenterology: not assessed      Genitourinary:  not assessed      Musculoskeletal:  not assessed      Neurologic:  not assessed      Psychiatric:  not assessed      Heme/Lymph/Imm:  not assessed      Endocrine:  Negative        Physical Exam:  Vitals: /76   Pulse 65   Ht 1.88 m (6' 2\")   Wt 109 kg (240 lb 3.2 oz)   SpO2 98%   BMI 30.84 kg/m    Eyes: No icterus.  Pulmonary: Chest symmetric, lungs clear bilaterally and no crackles, wheezes or rales.  Cardiovascular: RRR with normal S1 and S2, no murmur, JVP normal.  Musculoskeletal: Edema of the lower extremities: None.  Neurologic: Oriented " and appropriate without obvious focal deficits.   Psychiatric: Normal affect.     Recent Lab Results:  LIPID RESULTS:  Lab Results   Component Value Date    CHOL 117 09/13/2022    CHOL 134 10/19/2020    HDL 32 (L) 09/13/2022    HDL 40 10/19/2020    LDL 57 09/13/2022    LDL 66 10/19/2020    TRIG 139 09/13/2022    TRIG 142 10/19/2020    CHOLHDLRATIO 6 (H) 10/09/2002       LIVER ENZYME RESULTS:  Lab Results   Component Value Date    AST 19 09/13/2022    AST 25 10/19/2020    ALT 27 09/13/2022    ALT 48 10/19/2020       CBC RESULTS:  Lab Results   Component Value Date    WBC 4.0 09/13/2022    WBC 4.1 08/12/2020    RBC 4.71 09/13/2022    RBC 5.11 08/12/2020    HGB 14.8 09/13/2022    HGB 15.6 08/12/2020    HCT 42.2 09/13/2022    HCT 46.5 08/12/2020    MCV 90 09/13/2022    MCV 91 08/12/2020    MCH 31.4 09/13/2022    MCH 30.5 08/12/2020    MCHC 35.1 09/13/2022    MCHC 33.5 08/12/2020    RDW 12.2 09/13/2022    RDW 11.9 08/12/2020     09/13/2022     08/12/2020       BMP RESULTS:  Lab Results   Component Value Date     09/13/2022     10/19/2020    POTASSIUM 4.0 09/13/2022    POTASSIUM 3.7 10/19/2020    CHLORIDE 108 09/13/2022    CHLORIDE 104 10/19/2020    CO2 24 09/13/2022    CO2 31 10/19/2020    ANIONGAP 9 09/13/2022    ANIONGAP 4 10/19/2020     (H) 09/13/2022    GLC 89 10/19/2020    BUN 11 09/13/2022    BUN 11 10/19/2020    CR 0.61 (L) 09/13/2022    CR 0.74 10/19/2020    GFRESTIMATED >90 09/13/2022    GFRESTIMATED >90 10/19/2020    GFRESTBLACK >90 10/19/2020    DANIELITO 9.0 09/13/2022    DANIELITO 9.0 10/19/2020        A1C RESULTS:  Lab Results   Component Value Date    A1C 5.0 04/22/2020       INR RESULTS:  No results found for: INR    CC  Abelino Wayne MD  [address]  [place] [address]    All medical records were reviewed in detail and discussed with the patient. Greater than 30 mins were spent with the patient, 50% of this time was spent on counseling and coordination of care.  After  visit summary was printed and given to the patient.    Thank you for allowing me to participate in the care of your patient.      Sincerely,     Abelino Wayne MD     [facility]

## 2022-09-22 NOTE — PROGRESS NOTES
.  HPI and Plan:   Today I had the pleasure of seeing Amado Veliz at [facility] clinic. He is a pleasant 62 year old patient with a   Past medical history of coronary disease status post PCI of proximal RCA in 04/2020, hyperlipidemia, anxiety and remote tobacco abuse who presented to the clinic for a follow-up visit.    Please refer to my note from 3/11/2021 for full details.  I have been following the patient along with him Monik.  Since undergoing coronary angiogram and PCI of the proximal RCA the patient continued to have atypical chest pain for which extensive work-up was pursued.  This included a nuclear stress test and then cardiac MRI with stress.  Both these tests failed to show any reversible ischemia.   Today he tells me that he has been feeling extremely well since his last visit with keyla Ruggiero in 09/2021.  The chest pain that he had previously reported on several occasions has completely resolved and he is able to now do activities of daily living and then some moderate exertion without any difficulty.  He believes that the symptoms were secondary to stress and anxiety and acknowledging that fact has made the difference.  He recently had blood work which showed LDL cholesterol of less than 70 mg/dL.  Echocardiogram in the past has shown normal ejection fraction and no wall motion abnormalities.  He walks over 3 miles a day, tries to eat low-sodium, Mediterranean style diet and takes all his medications as prescribed.  During the summer he did have a few episodes of postural hypotension which quickly resolved with hydration.  I reviewed nuclear stress test, echocardiogram, EKG and cardiac MRI and angiogram films today.    Assessment and plan  1.  Single-vessel coronary disease s/p PCI of proximal RCA in 04/2020  2.  Atypical chest pain-resolved  3.  Hyperlipidemia  4.  Anxiety    It was so nice to meet Mr. Núñez again in clinic today for a follow-up visit.  I am glad  that his prior symptoms have completely resolved and he is doing extremely well.  He is doing all the right things by exercising regularly, eating a Mediterranean style diet and taking his medications as prescribed.  I do not believe we need to make any changes to his medical regimen today.  I will have him come and see us on a yearly basis but if things change I will be happy to see him earlier than that.  I do not believe need to order any tests today.    Thank you for allowing me to participate in the care of Amado Veliz    This note was completed in part using Dragon voice recognition software. Although reviewed after completion, some word and grammatical errors may occur.    Abelino Wayne MD  Cardiology    No orders of the defined types were placed in this encounter.      No orders of the defined types were placed in this encounter.      Medications Discontinued During This Encounter   Medication Reason     sertraline (ZOLOFT) 100 MG tablet Discontinued by another Health Care Provider       Encounter Diagnoses   Name Primary?     Coronary artery disease involving native coronary artery of native heart without angina pectoris Yes     Hyperlipidemia LDL goal <70        CURRENT MEDICATIONS:  Current Outpatient Medications   Medication Sig Dispense Refill     aspirin (ASA) 81 MG EC tablet Take 1 tablet (81 mg) by mouth daily 90 tablet 3     atorvastatin (LIPITOR) 20 MG tablet TAKE 1 TABLET(20 MG) BY MOUTH EVERY EVENING 90 tablet 2     metoprolol succinate ER (TOPROL XL) 25 MG 24 hr tablet TAKE ONE-HALF TABLET BY MOUTH DAILY 90 tablet 0     multivitamin w/minerals (THERA-VIT-M) tablet Take 1 tablet by mouth daily       nitroGLYcerin (NITROSTAT) 0.4 MG sublingual tablet Place 1 tablet (0.4 mg) under the tongue every 5 minutes as needed for chest pain For chest pain place 1 tablet under the tongue every 5 minutes for 3 doses. If symptoms persist 5 minutes after 1st dose call 911. 30 tablet 0       ALLERGIES      Allergies   Allergen Reactions     Niacin Unknown     Patient gets flushed and red in the face.       PAST MEDICAL HISTORY:  Past Medical History:   Diagnosis Date     Anxiety      CAD (coronary artery disease)     Status post stenting of proximal right coronary artery complex ruptured plaque on 2020.     Hyperlipidemia LDL goal <70      Intestinal infection due to Clostridium difficile      Pharyngoesophageal dysphagia        PAST SURGICAL HISTORY:  Past Surgical History:   Procedure Laterality Date     BACK SURGERY      L5-S1 disk herniation      CV CORONARY ANGIOGRAM N/A 2020    Procedure: Coronary Angiogram;  Surgeon: Derek Walker MD;  Location:  HEART CARDIAC CATH LAB     CV INTRAVASULAR ULTRASOUND N/A 2020    Procedure: Intravascular Ultrasound;  Surgeon: Derek Walker MD;  Location:  HEART CARDIAC CATH LAB     CV PCI STENT DRUG ELUTING       CV PCI STENT DRUG ELUTING N/A 2020    Procedure: Percutaneous Coronary Intervention Stent Drug Eluting;  Surgeon: Derek Walker MD;  Location:  HEART CARDIAC CATH LAB       FAMILY HISTORY:  Family History   Problem Relation Age of Onset     Family History Negative Father      Deep Vein Thrombosis Father      Atrial fibrillation Father      Family History Negative Mother      Family History Negative Brother      Deep Vein Thrombosis Sister      Ovarian Cancer Maternal Grandmother      Coronary Artery Disease Maternal Grandfather        SOCIAL HISTORY:  Social History     Socioeconomic History     Marital status:      Spouse name: None     Number of children: 0     Years of education: None     Highest education level: High school graduate   Occupational History     Occupation:    Tobacco Use     Smoking status: Former Smoker     Packs/day: 1.00     Years: 15.00     Pack years: 15.00     Types: Cigarettes     Start date:      Quit date: 3/17/1993     Years since quittin.5      "Smokeless tobacco: Never Used   Vaping Use     Vaping Use: Never used   Substance and Sexual Activity     Alcohol use: No     Drug use: No     Sexual activity: Yes     Partners: Female   Other Topics Concern     Seat Belt Yes   Social History Narrative    SD CHECKED REGULARLY.    GUNS UNLOADED IN CLOSET AT HOME.     Social Determinants of Health     Financial Resource Strain: Low Risk      Difficulty of Paying Living Expenses: Not hard at all   Food Insecurity: No Food Insecurity     Worried About Running Out of Food in the Last Year: Never true     Ran Out of Food in the Last Year: Never true   Transportation Needs: No Transportation Needs     Lack of Transportation (Medical): No     Lack of Transportation (Non-Medical): No   Intimate Partner Violence: Not At Risk     Fear of Current or Ex-Partner: No     Emotionally Abused: No     Physically Abused: No     Sexually Abused: No       Review of Systems:  Skin:  not assessed       Eyes:  not assessed      ENT:  not assessed      Respiratory:  Negative       Cardiovascular:    Positive for;fatigue;lightheadedness with exertion and in heat  Gastroenterology: not assessed      Genitourinary:  not assessed      Musculoskeletal:  not assessed      Neurologic:  not assessed      Psychiatric:  not assessed      Heme/Lymph/Imm:  not assessed      Endocrine:  Negative        Physical Exam:  Vitals: /76   Pulse 65   Ht 1.88 m (6' 2\")   Wt 109 kg (240 lb 3.2 oz)   SpO2 98%   BMI 30.84 kg/m    Eyes: No icterus.  Pulmonary: Chest symmetric, lungs clear bilaterally and no crackles, wheezes or rales.  Cardiovascular: RRR with normal S1 and S2, no murmur, JVP normal.  Musculoskeletal: Edema of the lower extremities: None.  Neurologic: Oriented and appropriate without obvious focal deficits.   Psychiatric: Normal affect.     Recent Lab Results:  LIPID RESULTS:  Lab Results   Component Value Date    CHOL 117 09/13/2022    CHOL 134 10/19/2020    HDL 32 (L) 09/13/2022    HDL " 40 10/19/2020    LDL 57 09/13/2022    LDL 66 10/19/2020    TRIG 139 09/13/2022    TRIG 142 10/19/2020    CHOLHDLRATIO 6 (H) 10/09/2002       LIVER ENZYME RESULTS:  Lab Results   Component Value Date    AST 19 09/13/2022    AST 25 10/19/2020    ALT 27 09/13/2022    ALT 48 10/19/2020       CBC RESULTS:  Lab Results   Component Value Date    WBC 4.0 09/13/2022    WBC 4.1 08/12/2020    RBC 4.71 09/13/2022    RBC 5.11 08/12/2020    HGB 14.8 09/13/2022    HGB 15.6 08/12/2020    HCT 42.2 09/13/2022    HCT 46.5 08/12/2020    MCV 90 09/13/2022    MCV 91 08/12/2020    MCH 31.4 09/13/2022    MCH 30.5 08/12/2020    MCHC 35.1 09/13/2022    MCHC 33.5 08/12/2020    RDW 12.2 09/13/2022    RDW 11.9 08/12/2020     09/13/2022     08/12/2020       BMP RESULTS:  Lab Results   Component Value Date     09/13/2022     10/19/2020    POTASSIUM 4.0 09/13/2022    POTASSIUM 3.7 10/19/2020    CHLORIDE 108 09/13/2022    CHLORIDE 104 10/19/2020    CO2 24 09/13/2022    CO2 31 10/19/2020    ANIONGAP 9 09/13/2022    ANIONGAP 4 10/19/2020     (H) 09/13/2022    GLC 89 10/19/2020    BUN 11 09/13/2022    BUN 11 10/19/2020    CR 0.61 (L) 09/13/2022    CR 0.74 10/19/2020    GFRESTIMATED >90 09/13/2022    GFRESTIMATED >90 10/19/2020    GFRESTBLACK >90 10/19/2020    DANIELITO 9.0 09/13/2022    DANIELITO 9.0 10/19/2020        A1C RESULTS:  Lab Results   Component Value Date    A1C 5.0 04/22/2020       INR RESULTS:  No results found for: INR    CC  Abelino Wayne MD  [address]  [place] [address]    All medical records were reviewed in detail and discussed with the patient. Greater than 30 mins were spent with the patient, 50% of this time was spent on counseling and coordination of care.  After visit summary was printed and given to the patient.

## 2022-10-07 ENCOUNTER — VIRTUAL VISIT (OUTPATIENT)
Dept: INTERNAL MEDICINE | Facility: CLINIC | Age: 62
End: 2022-10-07
Payer: COMMERCIAL

## 2022-10-07 DIAGNOSIS — F41.9 ANXIETY: Primary | ICD-10-CM

## 2022-10-07 PROCEDURE — 99213 OFFICE O/P EST LOW 20 MIN: CPT | Mod: 95 | Performed by: INTERNAL MEDICINE

## 2022-10-07 RX ORDER — CLONAZEPAM 0.5 MG/1
0.5 TABLET ORAL 2 TIMES DAILY PRN
Qty: 20 TABLET | Refills: 0 | Status: SHIPPED | OUTPATIENT
Start: 2022-10-07

## 2022-10-07 NOTE — PROGRESS NOTES
"Bassam is a 62 year old who is being evaluated via a billable video visit.      How would you like to obtain your AVS? MyChart  If the video visit is dropped, the invitation should be resent by: Text to cell phone: [phone]  Will anyone else be joining your video visit? No      Assessment & Plan   (F41.9) Anxiety  (primary encounter diagnosis)  Comment: See pt instructions and epic orders.   Plan: clonazePAM (KLONOPIN) 0.5 MG tablet               Patient Instructions   Remain off of sertraline.     Have a conversation again with your wife in about two months regarding how you are feeling and what she is observing as you remain off of sertaline.     Set up a video or office visit with me in three months, sooner if needed.       No follow-ups on file.    Ildefonso Lucas MD,   [facility]    Patrick Banegas is a 62 year old, presenting for the following health issues:  Follow Up (Labs and medications.)      History of Present Illness       Reason for visit:  Taper off zoloft    He eats 2-3 servings of fruits and vegetables daily.He consumes 0 sweetened beverage(s) daily.He exercises with enough effort to increase his heart rate 30 to 60 minutes per day.  He exercises with enough effort to increase his heart rate 5 days per week.   He is taking medications regularly.     Since our last appointment with me he has reduced his dose in 50 mg increments each month, and believes that he has been off of it completely for about a month.     The patient feels better off of sertraline. However, he does note that his wife has mentioned about two weeks ago that he seems to have a \"shorter fuse\".     We discussed that irritability may be a manifestation of slight anxiety.   He has needed clonazepam rarely, but would like to have a refill available. This is renewed.     Past medical, family and social histories as well as medications reviewed and updated as needed.    Review of Systems   REVIEW OF SYSTEMS: " The following systems have been completely reviewed and are negative except as noted above:   Constitutional, cardiovascular, musculoskeletal, psychiatric, and neurologic systems.        Objective           Vitals:  No vitals were obtained today due to virtual visit.    Physical Exam   GENERAL: Healthy, alert and no distress  EYES: Eyes grossly normal to inspection.  No discharge or erythema, or obvious scleral/conjunctival abnormalities.  RESP: No audible wheeze, cough, or visible cyanosis.  No visible retractions or increased work of breathing.    SKIN: Visible skin clear. No significant rash, abnormal pigmentation or lesions.  NEURO: Cranial nerves grossly intact.  Mentation and speech appropriate for age.  PSYCH: Mentation appears normal, affect normal/bright, judgement and insight intact, normal speech and appearance well-groomed.            Video-Visit Details    Video Start Time: 1456    Type of service:  Video Visit    Video End Time: 1515    Originating Location (pt. Location): Home    Distant Location (provider location):  [employer]     Platform used for Video Visit: [email or web address]

## 2022-10-09 NOTE — PATIENT INSTRUCTIONS
Remain off of sertraline.     Have a conversation again with your wife in about two months regarding how you are feeling and what she is observing as you remain off of sertaline.     Set up a video or office visit with me in three months, sooner if needed.    normal... Well appearing, well nourished, awake, alert, oriented to person, place, time/situation and in no apparent distress.

## 2022-10-10 ENCOUNTER — HEALTH MAINTENANCE LETTER (OUTPATIENT)
Age: 62
End: 2022-10-10

## 2022-10-29 ENCOUNTER — IMMUNIZATION (OUTPATIENT)
Dept: FAMILY MEDICINE | Facility: CLINIC | Age: 62
End: 2022-10-29
Payer: COMMERCIAL

## 2022-10-29 DIAGNOSIS — Z23 NEED FOR PROPHYLACTIC VACCINATION AND INOCULATION AGAINST INFLUENZA: ICD-10-CM

## 2022-10-29 DIAGNOSIS — Z23 HIGH PRIORITY FOR 2019-NCOV VACCINE: Primary | ICD-10-CM

## 2022-10-29 PROCEDURE — 90471 IMMUNIZATION ADMIN: CPT

## 2022-10-29 PROCEDURE — 91313 COVID-19,PF,MODERNA BIVALENT: CPT

## 2022-10-29 PROCEDURE — 0134A COVID-19,PF,MODERNA BIVALENT: CPT

## 2022-10-29 PROCEDURE — 90682 RIV4 VACC RECOMBINANT DNA IM: CPT

## 2022-12-14 ENCOUNTER — TELEPHONE (OUTPATIENT)
Dept: INTERNAL MEDICINE | Facility: CLINIC | Age: 62
End: 2022-12-14

## 2022-12-14 ENCOUNTER — MYC MEDICAL ADVICE (OUTPATIENT)
Dept: INTERNAL MEDICINE | Facility: CLINIC | Age: 62
End: 2022-12-14

## 2022-12-14 DIAGNOSIS — R07.89 ATYPICAL CHEST PAIN: ICD-10-CM

## 2022-12-14 NOTE — TELEPHONE ENCOUNTER
Please see message below and advise.    Metoprolol was refilled for 90 days, and patient was told to follow up per 10/7/22 encounter.

## 2022-12-14 NOTE — TELEPHONE ENCOUNTER
"Patient called for status of refill. He was informed it was sent to [facility] today and that he needs to make an appointment.   He refused to make an appointment and said he was just in in Oct. He acknowledged he was to make an appt 3mo from Oct but said\" I know Armond, he will fill it\"     Patient is leaving the state at the first of the year for 5 months.    Patient call back number is [phone]  "

## 2022-12-15 RX ORDER — METOPROLOL SUCCINATE 25 MG/1
12.5 TABLET, EXTENDED RELEASE ORAL DAILY
Qty: 90 TABLET | Refills: 3 | Status: SHIPPED | OUTPATIENT
Start: 2022-12-15 | End: 2023-03-17

## 2022-12-15 NOTE — TELEPHONE ENCOUNTER
Refilled metoprolol for a year.   I had asked him to f/u in around three months at our 10/2022 visit.

## 2023-02-18 ENCOUNTER — HEALTH MAINTENANCE LETTER (OUTPATIENT)
Age: 63
End: 2023-02-18

## 2023-03-17 DIAGNOSIS — R07.89 ATYPICAL CHEST PAIN: ICD-10-CM

## 2023-03-17 RX ORDER — METOPROLOL SUCCINATE 25 MG/1
TABLET, EXTENDED RELEASE ORAL
Qty: 90 TABLET | Refills: 1 | Status: SHIPPED | OUTPATIENT
Start: 2023-03-17 | End: 2023-05-03

## 2023-05-02 ASSESSMENT — ANXIETY QUESTIONNAIRES
GAD7 TOTAL SCORE: 2
2. NOT BEING ABLE TO STOP OR CONTROL WORRYING: SEVERAL DAYS
7. FEELING AFRAID AS IF SOMETHING AWFUL MIGHT HAPPEN: NOT AT ALL
6. BECOMING EASILY ANNOYED OR IRRITABLE: NOT AT ALL
7. FEELING AFRAID AS IF SOMETHING AWFUL MIGHT HAPPEN: NOT AT ALL
GAD7 TOTAL SCORE: 2
4. TROUBLE RELAXING: NOT AT ALL
IF YOU CHECKED OFF ANY PROBLEMS ON THIS QUESTIONNAIRE, HOW DIFFICULT HAVE THESE PROBLEMS MADE IT FOR YOU TO DO YOUR WORK, TAKE CARE OF THINGS AT HOME, OR GET ALONG WITH OTHER PEOPLE: NOT DIFFICULT AT ALL
1. FEELING NERVOUS, ANXIOUS, OR ON EDGE: SEVERAL DAYS
8. IF YOU CHECKED OFF ANY PROBLEMS, HOW DIFFICULT HAVE THESE MADE IT FOR YOU TO DO YOUR WORK, TAKE CARE OF THINGS AT HOME, OR GET ALONG WITH OTHER PEOPLE?: NOT DIFFICULT AT ALL
GAD7 TOTAL SCORE: 2
5. BEING SO RESTLESS THAT IT IS HARD TO SIT STILL: NOT AT ALL
3. WORRYING TOO MUCH ABOUT DIFFERENT THINGS: NOT AT ALL

## 2023-05-03 ENCOUNTER — OFFICE VISIT (OUTPATIENT)
Dept: INTERNAL MEDICINE | Facility: CLINIC | Age: 63
End: 2023-05-03
Payer: COMMERCIAL

## 2023-05-03 VITALS
DIASTOLIC BLOOD PRESSURE: 70 MMHG | RESPIRATION RATE: 18 BRPM | OXYGEN SATURATION: 99 % | WEIGHT: 242 LBS | TEMPERATURE: 97.5 F | HEART RATE: 70 BPM | HEIGHT: 74 IN | BODY MASS INDEX: 31.06 KG/M2 | SYSTOLIC BLOOD PRESSURE: 130 MMHG

## 2023-05-03 DIAGNOSIS — F41.9 ANXIETY: ICD-10-CM

## 2023-05-03 DIAGNOSIS — F43.0 ACUTE REACTION TO STRESS: ICD-10-CM

## 2023-05-03 DIAGNOSIS — R10.13 EPIGASTRIC PAIN: ICD-10-CM

## 2023-05-03 DIAGNOSIS — R07.89 ATYPICAL CHEST PAIN: Primary | ICD-10-CM

## 2023-05-03 PROCEDURE — 99215 OFFICE O/P EST HI 40 MIN: CPT | Performed by: INTERNAL MEDICINE

## 2023-05-03 RX ORDER — FAMOTIDINE 20 MG/1
20 TABLET, FILM COATED ORAL
COMMUNITY
Start: 2023-04-23 | End: 2023-05-03

## 2023-05-03 RX ORDER — METOPROLOL SUCCINATE 25 MG/1
12.5 TABLET, EXTENDED RELEASE ORAL DAILY
Qty: 45 TABLET | Refills: 3 | Status: SHIPPED | OUTPATIENT
Start: 2023-05-03 | End: 2023-11-08

## 2023-05-03 RX ORDER — FAMOTIDINE 20 MG/1
20 TABLET, FILM COATED ORAL 2 TIMES DAILY PRN
Qty: 60 TABLET | Refills: 11 | Status: SHIPPED | OUTPATIENT
Start: 2023-05-03 | End: 2023-11-08

## 2023-05-03 RX ORDER — CITALOPRAM HYDROBROMIDE 10 MG/1
20 TABLET ORAL DAILY
Qty: 60 TABLET | Refills: 4 | Status: SHIPPED | OUTPATIENT
Start: 2023-05-03 | End: 2023-09-08

## 2023-05-03 RX ORDER — IBUPROFEN 200 MG
200-400 TABLET ORAL
COMMUNITY

## 2023-05-03 RX ORDER — FOLIC ACID/MULTIVIT,IRON,MINER 0.4MG-18MG
1 TABLET ORAL
COMMUNITY

## 2023-05-03 ASSESSMENT — ANXIETY QUESTIONNAIRES: GAD7 TOTAL SCORE: 2

## 2023-05-03 NOTE — PROGRESS NOTES
"Face to face time with patient: 48 minutes (2725---0813)       Assessment & Plan   (R07.89) Atypical chest pain  (primary encounter diagnosis)  Comment: Suspect anxiety-related, perhaps with some musculoskeletal and/or GI components. We agreed for him to resume citalopram as ordered.   Plan: metoprolol succinate ER (TOPROL XL) 25 MG 24 hr        tablet, PRIMARY CARE FOLLOW-UP SCHEDULING          (F41.9) Anxiety  Comment: Suspect anxiety-related, perhaps with some musculoskeletal and/or GI components. We agreed for him to resume citalopram as ordered.   Plan: citalopram (CELEXA) 10 MG tablet, PRIMARY CARE         FOLLOW-UP SCHEDULING    (F43.0) Acute reaction to stress  Comment: Discussed.     (R10.13) Epigastric pain  Comment: We agreed for him to at least temporarily remain on famotidine.   Plan: famotidine (PEPCID) 20 MG tablet            BMI:   Estimated body mass index is 31.07 kg/m  as calculated from the following:    Height as of this encounter: 1.88 m (6' 2\").    Weight as of this encounter: 109.8 kg (242 lb).   Weight management plan: Discussed healthy diet and exercise guidelines    Patient Instructions   Let's keep the  famotidine available for twice daily use as needed for upper abdominal pains.     Let's try resuming citalopram 10 mg, initially once daily for 1-4 weeks, then bump to two tabs each morning (would take with food).     Dr Lucas will try to get [facility] CT images in front of a [place] radiologist to compare 2023 liver images with 2020 liver images. I suspect that we had two different interpreters looking at the same findings and drawing different conclusions (suspect benign process in liver).     See me in 3-6 months for follow up.       Ildefonso Lucas MD,   [facility] TONY Banegas is a 63 year old, presenting for the following health issues:  ER F/U        5/3/2023     7:48 AM   Additional Questions   Roomed by Pretty HELLER CMA     History of " Present Illness       Mental Health Follow-up:  Patient presents to follow-up on Anxiety.    Patient's anxiety since last visit has been:  Good  The patient is not having other symptoms associated with anxiety.  Any significant life events: No  Patient is feeling anxious or having panic attacks.  Patient has no concerns about alcohol or drug use.    He eats 2-3 servings of fruits and vegetables daily.He consumes 0 sweetened beverage(s) daily.He exercises with enough effort to increase his heart rate 30 to 60 minutes per day.  He exercises with enough effort to increase his heart rate 3 or less days per week.   He is taking medications regularly.  Today's TAJ-7 Score: 2     Patient reports receiving a tetanus immunization while in [place] 2018.    ED/UC Followup:    Facility:  [facility] ED  Date of visit: 4/22/2023  Reason for visit: chest discomfort  Current Status: improved, symptoms wax and wane    The patient recently was seen at [facility] ED for chest discomfort. He had noticed some chest and upper abdominal discomfort. A CT coronary angiogram was obtained which showed a patent proximal RCA stent and no significant coronary occlusions.     Discussed the potential that pains were related to GI, musculoskeletal and/or anxiety sources.     The patient is inclined to believe that anxiety is playing a significant role.   He has noted the discomfort to be associated with anxious situations.   He and is wife are in the midst of legal matters with his two of his wife's siblings .  He discontinued sertraline some months ago after noting some adverse effects including vivid dreams/nightmares.   He had tolerated citalopram prior to using sertraline and is open to resuming this.     He was advised of incidental findings on his liver noted during this recent CT coronary angiogram. We do not have access to these images. The report comments upon some diffuse inhomogeneity which was present on prior  "abdominal CT from July 2020. We reviewed the report as well as some images from that CT.   As the radiologist did not appear to have the previous CT images or report available on this most recent CT, he raised the issue of obtaining additional imaging.   We discussed that these findings described recently appear very likely to have been present on the 7/2020 exam.     Past medical, family and social histories as well as medications reviewed and updated as needed.    Review of Systems   REVIEW OF SYSTEMS: The following systems have been completely reviewed and are negative except as noted above:   Constitutional, respiratory, cardiovascular, gastrointestinal, genitourinary, musculoskeletal, psychiatric, and neurologic systems.        Objective    /70 (BP Location: Left arm, Patient Position: Sitting, Cuff Size: Adult Large)   Pulse 70   Temp 97.5  F (36.4  C) (Tympanic)   Resp 18   Ht 1.88 m (6' 2\")   Wt 109.8 kg (242 lb)   SpO2 99%   BMI 31.07 kg/m    Body mass index is 31.07 kg/m .     Physical Exam   GENERAL: healthy, alert and no distress  MS: no gross musculoskeletal defects noted, no edema  NEURO: Normal strength and tone, mentation intact and speech normal  PSYCH: mentation appears normal, affect normal/bright                "

## 2023-05-03 NOTE — PATIENT INSTRUCTIONS
Let's keep the  famotidine available for twice daily use as needed for upper abdominal pains.     Let's try resuming citalopram 10 mg, initially once daily for 1-4 weeks, then bump to two tabs each morning (would take with food).     Dr Lucas will try to get [facility] CT images in front of a [place] radiologist to compare 2023 liver images with 2020 liver images. I suspect that we had two different interpreters looking at the same findings and drawing different conclusions (suspect benign process in liver).     See me in 3-6 months for follow up.

## 2023-05-04 RX ORDER — FAMOTIDINE 20 MG/1
TABLET, FILM COATED ORAL
Qty: 180 TABLET | OUTPATIENT
Start: 2023-05-04

## 2023-07-06 ENCOUNTER — TELEPHONE (OUTPATIENT)
Dept: AUDIOLOGY | Facility: CLINIC | Age: 63
End: 2023-07-06

## 2023-07-06 NOTE — TELEPHONE ENCOUNTER
[facility] Call Center    Phone Message    May a detailed message be left on voicemail: yes     Reason for Call: Other: [place] location, Pt would like copy of last Audiogram sent to him at his mailing address.  Please call when sent.  Thanks      Action Taken: Other: AUD    Travel Screening: Not Applicable

## 2023-07-28 ENCOUNTER — TELEPHONE (OUTPATIENT)
Dept: INTERNAL MEDICINE | Facility: CLINIC | Age: 63
End: 2023-07-28

## 2023-08-29 ENCOUNTER — TRANSFERRED RECORDS (OUTPATIENT)
Dept: HEALTH INFORMATION MANAGEMENT | Facility: CLINIC | Age: 63
End: 2023-08-29
Payer: COMMERCIAL

## 2023-09-28 NOTE — Clinical Note
Antiplatelet agents where given.  Mom gave tylenol at 0700, lungs clear   On and off warm since Monday, cough started Tuesday, decreased intake, adequate output   good, to achieve stated therapy goals

## 2023-10-04 ENCOUNTER — ALLIED HEALTH/NURSE VISIT (OUTPATIENT)
Dept: FAMILY MEDICINE | Facility: CLINIC | Age: 63
End: 2023-10-04
Payer: COMMERCIAL

## 2023-10-04 DIAGNOSIS — Z23 NEED FOR PNEUMOCOCCAL VACCINE: Primary | ICD-10-CM

## 2023-10-04 PROCEDURE — 90471 IMMUNIZATION ADMIN: CPT

## 2023-10-04 PROCEDURE — 90677 PCV20 VACCINE IM: CPT

## 2023-10-04 PROCEDURE — 99207 PR NO CHARGE NURSE ONLY: CPT

## 2023-10-04 NOTE — PROGRESS NOTES
Prior to immunization administration, verified patients identity using patient s name and date of birth. Please see Immunization Activity for additional information.     Screening Questionnaire for Adult Immunization    Are you sick today?   No   Do you have allergies to medications, food, a vaccine component or latex?   No   Have you ever had a serious reaction after receiving a vaccination?   No   Do you have a long-term health problem with heart, lung, kidney, or metabolic disease (e.g., diabetes), asthma, a blood disorder, no spleen, complement component deficiency, a cochlear implant, or a spinal fluid leak?  Are you on long-term aspirin therapy?   No   Do you have cancer, leukemia, HIV/AIDS, or any other immune system problem?   No   Do you have a parent, brother, or sister with an immune system problem?   No   In the past 3 months, have you taken medications that affect  your immune system, such as prednisone, other steroids, or anticancer drugs; drugs for the treatment of rheumatoid arthritis, Crohn s disease, or psoriasis; or have you had radiation treatments?   No   Have you had a seizure, or a brain or other nervous system problem?   No   During the past year, have you received a transfusion of blood or blood    products, or been given immune (gamma) globulin or antiviral drug?   No   For women: Are you pregnant or is there a chance you could become       pregnant during the next month?   No   Have you received any vaccinations in the past 4 weeks?   No     Immunization questionnaire answers were all negative.    I have reviewed the following standing orders: This patient is due and qualifies for the Pneumococcal vaccine.    Click here for Pneumococcal (Adult) Standing Order    I have reviewed the vaccines inclusion and exclusion criteria;No concerns regarding eligibility.     Patient instructed to remain in clinic for 15 minutes afterwards, and to report any adverse reactions.     Screening performed by  Lori Yang, CMA on 10/4/2023 at 2:25 PM.

## 2023-10-06 ENCOUNTER — DOCUMENTATION ONLY (OUTPATIENT)
Dept: INTERNAL MEDICINE | Facility: CLINIC | Age: 63
End: 2023-10-06
Payer: COMMERCIAL

## 2023-10-06 DIAGNOSIS — E78.5 HYPERLIPIDEMIA LDL GOAL <100: Primary | ICD-10-CM

## 2023-10-06 DIAGNOSIS — I25.10 CORONARY ARTERY DISEASE INVOLVING NATIVE CORONARY ARTERY OF NATIVE HEART WITHOUT ANGINA PECTORIS: ICD-10-CM

## 2023-10-06 DIAGNOSIS — Z12.5 SCREENING PSA (PROSTATE SPECIFIC ANTIGEN): ICD-10-CM

## 2023-10-06 DIAGNOSIS — R53.83 FATIGUE, UNSPECIFIED TYPE: ICD-10-CM

## 2023-10-11 ENCOUNTER — LAB (OUTPATIENT)
Dept: LAB | Facility: CLINIC | Age: 63
End: 2023-10-11
Payer: COMMERCIAL

## 2023-10-11 DIAGNOSIS — R53.83 FATIGUE, UNSPECIFIED TYPE: ICD-10-CM

## 2023-10-11 DIAGNOSIS — Z12.5 SCREENING PSA (PROSTATE SPECIFIC ANTIGEN): ICD-10-CM

## 2023-10-11 DIAGNOSIS — E78.5 HYPERLIPIDEMIA LDL GOAL <100: ICD-10-CM

## 2023-10-11 DIAGNOSIS — I25.10 CORONARY ARTERY DISEASE INVOLVING NATIVE CORONARY ARTERY OF NATIVE HEART WITHOUT ANGINA PECTORIS: ICD-10-CM

## 2023-10-11 LAB
ALBUMIN SERPL BCG-MCNC: 4.4 G/DL (ref 3.5–5.2)
ALP SERPL-CCNC: 49 U/L (ref 40–129)
ALT SERPL W P-5'-P-CCNC: 38 U/L (ref 0–70)
ANION GAP SERPL CALCULATED.3IONS-SCNC: 9 MMOL/L (ref 7–15)
AST SERPL W P-5'-P-CCNC: 29 U/L (ref 0–45)
BILIRUB SERPL-MCNC: 0.7 MG/DL
BUN SERPL-MCNC: 13.5 MG/DL (ref 8–23)
CALCIUM SERPL-MCNC: 9.6 MG/DL (ref 8.8–10.2)
CHLORIDE SERPL-SCNC: 104 MMOL/L (ref 98–107)
CHOLEST SERPL-MCNC: 139 MG/DL
CREAT SERPL-MCNC: 0.86 MG/DL (ref 0.67–1.17)
DEPRECATED HCO3 PLAS-SCNC: 29 MMOL/L (ref 22–29)
EGFRCR SERPLBLD CKD-EPI 2021: >90 ML/MIN/1.73M2
ERYTHROCYTE [DISTWIDTH] IN BLOOD BY AUTOMATED COUNT: 11.8 % (ref 10–15)
GLUCOSE SERPL-MCNC: 105 MG/DL (ref 70–99)
HCT VFR BLD AUTO: 45.4 % (ref 40–53)
HDLC SERPL-MCNC: 40 MG/DL
HGB BLD-MCNC: 15.4 G/DL (ref 13.3–17.7)
LDLC SERPL CALC-MCNC: 74 MG/DL
MCH RBC QN AUTO: 30.3 PG (ref 26.5–33)
MCHC RBC AUTO-ENTMCNC: 33.9 G/DL (ref 31.5–36.5)
MCV RBC AUTO: 89 FL (ref 78–100)
NONHDLC SERPL-MCNC: 99 MG/DL
PLATELET # BLD AUTO: 197 10E3/UL (ref 150–450)
POTASSIUM SERPL-SCNC: 4.7 MMOL/L (ref 3.4–5.3)
PROT SERPL-MCNC: 6.9 G/DL (ref 6.4–8.3)
PSA SERPL DL<=0.01 NG/ML-MCNC: 2.34 NG/ML (ref 0–4.5)
RBC # BLD AUTO: 5.08 10E6/UL (ref 4.4–5.9)
SODIUM SERPL-SCNC: 142 MMOL/L (ref 135–145)
TRIGL SERPL-MCNC: 126 MG/DL
TSH SERPL DL<=0.005 MIU/L-ACNC: 3.1 UIU/ML (ref 0.3–4.2)
WBC # BLD AUTO: 5.9 10E3/UL (ref 4–11)

## 2023-10-11 PROCEDURE — 80061 LIPID PANEL: CPT

## 2023-10-11 PROCEDURE — 85027 COMPLETE CBC AUTOMATED: CPT

## 2023-10-11 PROCEDURE — 36415 COLL VENOUS BLD VENIPUNCTURE: CPT

## 2023-10-11 PROCEDURE — 84443 ASSAY THYROID STIM HORMONE: CPT

## 2023-10-11 PROCEDURE — G0103 PSA SCREENING: HCPCS

## 2023-10-11 PROCEDURE — 80053 COMPREHEN METABOLIC PANEL: CPT

## 2023-10-24 ENCOUNTER — IMMUNIZATION (OUTPATIENT)
Dept: PEDIATRICS | Facility: CLINIC | Age: 63
End: 2023-10-24
Payer: COMMERCIAL

## 2023-10-24 DIAGNOSIS — Z23 ENCOUNTER FOR IMMUNIZATION: Primary | ICD-10-CM

## 2023-10-24 PROCEDURE — 90480 ADMN SARSCOV2 VAC 1/ONLY CMP: CPT

## 2023-10-24 PROCEDURE — 90686 IIV4 VACC NO PRSV 0.5 ML IM: CPT

## 2023-10-24 PROCEDURE — 99207 PR NO CHARGE NURSE ONLY: CPT

## 2023-10-24 PROCEDURE — 90471 IMMUNIZATION ADMIN: CPT

## 2023-10-24 PROCEDURE — 91320 SARSCV2 VAC 30MCG TRS-SUC IM: CPT

## 2023-10-24 NOTE — PROGRESS NOTES
Prior to immunization administration, verified patients identity using patient s name and date of birth. Please see Immunization Activity for additional information.     Screening Questionnaire for Adult Immunization    Are you sick today?   No   Do you have allergies to medications, food, a vaccine component or latex?   No   Have you ever had a serious reaction after receiving a vaccination?   No   Do you have a long-term health problem with heart, lung, kidney, or metabolic disease (e.g., diabetes), asthma, a blood disorder, no spleen, complement component deficiency, a cochlear implant, or a spinal fluid leak?  Are you on long-term aspirin therapy?   No   Do you have cancer, leukemia, HIV/AIDS, or any other immune system problem?   No   Do you have a parent, brother, or sister with an immune system problem?   No   In the past 3 months, have you taken medications that affect  your immune system, such as prednisone, other steroids, or anticancer drugs; drugs for the treatment of rheumatoid arthritis, Crohn s disease, or psoriasis; or have you had radiation treatments?   No   Have you had a seizure, or a brain or other nervous system problem?   No   During the past year, have you received a transfusion of blood or blood    products, or been given immune (gamma) globulin or antiviral drug?   No   For women: Are you pregnant or is there a chance you could become       pregnant during the next month?   No   Have you received any vaccinations in the past 4 weeks?   No     Immunization questionnaire answers were all negative.    I have reviewed the following standing orders:   This patient is due and qualifies for the Covid-19 vaccine.     Click here for COVID-19 Standing Order    I have reviewed the vaccines inclusion and exclusion criteria; No concerns regarding eligibility.     This patient is due and qualifies for the Influenza vaccine.    Click here for Influenza Vaccine Standing Order    I have reviewed the  vaccines inclusion and exclusion criteria; No concerns regarding eligibility.     Patient instructed to remain in clinic for 15 minutes afterwards, and to report any adverse reactions.     Screening performed by Anna Obrien CMA on 10/24/2023 at 10:47 AM.

## 2023-10-28 DIAGNOSIS — I20.0 UNSTABLE ANGINA (H): ICD-10-CM

## 2023-10-30 RX ORDER — ATORVASTATIN CALCIUM 20 MG/1
TABLET, FILM COATED ORAL
Qty: 90 TABLET | Refills: 2 | Status: SHIPPED | OUTPATIENT
Start: 2023-10-30 | End: 2023-11-08

## 2023-11-01 ASSESSMENT — ANXIETY QUESTIONNAIRES
GAD7 TOTAL SCORE: 0
IF YOU CHECKED OFF ANY PROBLEMS ON THIS QUESTIONNAIRE, HOW DIFFICULT HAVE THESE PROBLEMS MADE IT FOR YOU TO DO YOUR WORK, TAKE CARE OF THINGS AT HOME, OR GET ALONG WITH OTHER PEOPLE: NOT DIFFICULT AT ALL
GAD7 TOTAL SCORE: 0
1. FEELING NERVOUS, ANXIOUS, OR ON EDGE: NOT AT ALL
7. FEELING AFRAID AS IF SOMETHING AWFUL MIGHT HAPPEN: NOT AT ALL
3. WORRYING TOO MUCH ABOUT DIFFERENT THINGS: NOT AT ALL
6. BECOMING EASILY ANNOYED OR IRRITABLE: NOT AT ALL
5. BEING SO RESTLESS THAT IT IS HARD TO SIT STILL: NOT AT ALL
2. NOT BEING ABLE TO STOP OR CONTROL WORRYING: NOT AT ALL
4. TROUBLE RELAXING: NOT AT ALL

## 2023-11-08 ENCOUNTER — OFFICE VISIT (OUTPATIENT)
Dept: INTERNAL MEDICINE | Facility: CLINIC | Age: 63
End: 2023-11-08
Attending: INTERNAL MEDICINE
Payer: COMMERCIAL

## 2023-11-08 VITALS
BODY MASS INDEX: 31.44 KG/M2 | RESPIRATION RATE: 18 BRPM | OXYGEN SATURATION: 96 % | TEMPERATURE: 97.7 F | DIASTOLIC BLOOD PRESSURE: 68 MMHG | SYSTOLIC BLOOD PRESSURE: 124 MMHG | HEIGHT: 74 IN | HEART RATE: 74 BPM | WEIGHT: 245 LBS

## 2023-11-08 DIAGNOSIS — R07.89 ATYPICAL CHEST PAIN: Primary | ICD-10-CM

## 2023-11-08 DIAGNOSIS — K76.89 HEPATIC CYST: ICD-10-CM

## 2023-11-08 DIAGNOSIS — I20.0 UNSTABLE ANGINA (H): ICD-10-CM

## 2023-11-08 DIAGNOSIS — Z95.5 S/P RIGHT CORONARY ARTERY (RCA) STENT PLACEMENT: ICD-10-CM

## 2023-11-08 DIAGNOSIS — F41.9 ANXIETY: ICD-10-CM

## 2023-11-08 DIAGNOSIS — R73.09 ELEVATED GLUCOSE: ICD-10-CM

## 2023-11-08 PROCEDURE — 99215 OFFICE O/P EST HI 40 MIN: CPT | Performed by: INTERNAL MEDICINE

## 2023-11-08 RX ORDER — METOPROLOL SUCCINATE 25 MG/1
12.5 TABLET, EXTENDED RELEASE ORAL DAILY
Qty: 45 TABLET | Refills: 3 | Status: SHIPPED | OUTPATIENT
Start: 2023-11-08

## 2023-11-08 RX ORDER — NITROGLYCERIN 0.4 MG/1
0.4 TABLET SUBLINGUAL EVERY 5 MIN PRN
Qty: 25 TABLET | Refills: 0 | Status: SHIPPED | OUTPATIENT
Start: 2023-11-08

## 2023-11-08 RX ORDER — CITALOPRAM HYDROBROMIDE 20 MG/1
20 TABLET ORAL DAILY
Qty: 90 TABLET | Refills: 3 | Status: SHIPPED | OUTPATIENT
Start: 2023-11-08 | End: 2024-08-16

## 2023-11-08 RX ORDER — ATORVASTATIN CALCIUM 20 MG/1
20 TABLET, FILM COATED ORAL EVERY EVENING
Qty: 90 TABLET | Refills: 3 | Status: SHIPPED | OUTPATIENT
Start: 2023-11-08

## 2023-11-08 NOTE — PATIENT INSTRUCTIONS
Everything looks fine.     Refilled all meds for another year.     See me in a year, sooner if concerns. Let me know if you ever do want to get a liver ultrasound to prove that those previously reported findings are cysts--I would find this to be totally optional.

## 2023-11-08 NOTE — PROGRESS NOTES
"Face to face time with patient: 50 minutes (9918---0808)     Assessment & Plan   (R07.89) Atypical chest pain  (primary encounter diagnosis)  Comment: Suspect musculoskeletal or anxiety-related discomfort. Continue current meds.   Plan: metoprolol succinate ER (TOPROL XL) 25 MG 24 hr        tablet          (F41.9) Anxiety  Comment: Overall Well controlled. Continue current meds.   Plan: citalopram (CELEXA) 20 MG tablet          (I20.0) Unstable angina (H)  Comment: Refilled atorvastatin.   Plan: atorvastatin (LIPITOR) 20 MG tablet          (Z95.5) S/P right coronary artery (RCA) stent placement  Comment: Refilled NTG in event of typical anginal symptoms.   Plan: nitroGLYcerin (NITROSTAT) 0.4 MG sublingual         tablet          (R73.09) Elevated glucose  Comment: See discussion. Advised careful attention to carbohydrate choices/portions.    (K76.89) Hepatic cyst  Comment: Numerous attenuated subcentimeter lesions seen in 2020 and on report from 2023, almost certainly congenital cysts. See pt instructions and epic orders.      BMI:   Estimated body mass index is 31.46 kg/m  as calculated from the following:    Height as of this encounter: 1.88 m (6' 2\").    Weight as of this encounter: 111.1 kg (245 lb).   Weight management plan: Discussed healthy diet and exercise guidelines    Patient Instructions   Everything looks fine.     Refilled all meds for another year.     See me in a year, sooner if concerns. Let me know if you ever do want to get a liver ultrasound to prove that those previously reported findings are cysts--I would find this to be totally optional.     Ildefonso Lucas MD,   [facility]    Patrick Banegas is a 63 year old, presenting for the following health issues:  Follow Up        11/8/2023     6:45 AM   Additional Questions   Roomed by Pretty HELLER CMA       History of Present Illness       Mental Health Follow-up:  Patient presents to follow-up on Anxiety.    Patient's anxiety " since last visit has been:  Better  The patient is not having other symptoms associated with anxiety.  Any significant life events: No  Patient is not feeling anxious or having panic attacks.  Patient has no concerns about alcohol or drug use.      We discussed most recent episode of chest discomfort that prompted self-referral to [facility] ED on 4/23/2023. Evaluation included a CT coronary angiogram which showed a patent stent from  previous angioplasty and no other significant coronary occlusive disease.     Since then the patient has done quite well with respect to episodes of chest discomfort.   He does feel as though anxiety symptoms are improved on citalopram.   He has not been needing famotidine or antacids regularly.     We reviewed the incidental finding of hepatic lesions on the above CT. We looked at the report from the more recent CT (images not available to us) and looked at actual images of liver from previous abdominal CT dated 7/26/2020. This showed mulitiple low attenuation subcentimeter liver lesions strongly suggestive of benign cysts or other benign lesions.   We discussed that further liver imaging (eg, ultrasound) would be a consideration, but agreed that the yield in identifying any serious pathology would seem quite low.     Reviewed labs from 10/11/2023: LDL-Cholesterol appears to be at target on current statin dosing. Discussed that higher doses of the more potent statins (eg, atorvastatin or rosuvastatin) may cause a slight glucose elevation.     Refilled needed medications including atorvastatin, NTG (has not been needed), and citalopram.     Past medical, family and social histories as well as medications reviewed and updated as needed.    Review of Systems   REVIEW OF SYSTEMS: The following systems have been completely reviewed and are negative except as noted above:   Constitutional, respiratory, cardiovascular, gastrointestinal, musculoskeletal, psychiatric, and  "neurologic systems.        Objective    /68 (BP Location: Left arm, Patient Position: Sitting, Cuff Size: Adult Large)   Pulse 74   Temp 97.7  F (36.5  C) (Oral)   Resp 18   Ht 1.88 m (6' 2\")   Wt 111.1 kg (245 lb)   SpO2 96%   BMI 31.46 kg/m    Body mass index is 31.46 kg/m .  Physical Exam   GENERAL: healthy, alert and no distress  RESP: lungs clear to auscultation - no rales, rhonchi or wheezes  CV: regular rate and rhythm, normal S1 S2, no S3 or S4, no murmur, click or rub, no peripheral edema and peripheral pulses strong  MS: no gross musculoskeletal defects noted, no edema  NEURO: Normal strength and tone, mentation intact and speech normal  PSYCH: mentation appears normal, affect normal/bright        "

## 2024-03-16 ENCOUNTER — HEALTH MAINTENANCE LETTER (OUTPATIENT)
Age: 64
End: 2024-03-16

## 2024-08-08 ENCOUNTER — TELEPHONE (OUTPATIENT)
Dept: INTERNAL MEDICINE | Facility: CLINIC | Age: 64
End: 2024-08-08
Payer: COMMERCIAL

## 2024-08-08 DIAGNOSIS — R53.83 FATIGUE, UNSPECIFIED TYPE: ICD-10-CM

## 2024-08-08 DIAGNOSIS — E78.5 HYPERLIPIDEMIA LDL GOAL <100: Primary | ICD-10-CM

## 2024-08-08 DIAGNOSIS — I25.10 CORONARY ARTERY DISEASE INVOLVING NATIVE CORONARY ARTERY OF NATIVE HEART WITHOUT ANGINA PECTORIS: ICD-10-CM

## 2024-08-08 DIAGNOSIS — Z12.5 SCREENING PSA (PROSTATE SPECIFIC ANTIGEN): ICD-10-CM

## 2024-08-08 NOTE — TELEPHONE ENCOUNTER
Order/Referral Request      Reason for Call:  Pt calling to make sure he has orders to do his labs (for his appt) on Oct. 30th.  Labs scheduled for 10/22 in [place].  Pt is requesting to go over these results during the 30th appt.    Who is requesting: Patient    Orders being requested: lab    Reason service is needed/diagnosis: for a follow up appt scheduled for 10/30    When are orders needed by: date of appt    Has this been discussed with Provider: No    Does patient have a preference on a Group/Provider/Facility?  [place]    Does patient have an appointment scheduled?: No    Where to send orders: N/A - in epic    Could we send this information to you in [facility] or would you prefer to receive a phone call?:   zohra GODWIN

## 2024-08-15 DIAGNOSIS — F41.9 ANXIETY: ICD-10-CM

## 2024-08-16 RX ORDER — CITALOPRAM HYDROBROMIDE 20 MG/1
20 TABLET ORAL DAILY
Qty: 90 TABLET | Refills: 0 | Status: SHIPPED | OUTPATIENT
Start: 2024-08-16

## 2024-10-16 ENCOUNTER — IMMUNIZATION (OUTPATIENT)
Dept: PEDIATRICS | Facility: CLINIC | Age: 64
End: 2024-10-16
Payer: COMMERCIAL

## 2024-10-16 VITALS — TEMPERATURE: 97.3 F

## 2024-10-16 DIAGNOSIS — Z23 NEED FOR PROPHYLACTIC VACCINATION AND INOCULATION AGAINST INFLUENZA: Primary | ICD-10-CM

## 2024-10-16 PROCEDURE — 99207 PR NO CHARGE NURSE ONLY: CPT

## 2024-10-16 PROCEDURE — 90673 RIV3 VACCINE NO PRESERV IM: CPT

## 2024-10-16 PROCEDURE — 91320 SARSCV2 VAC 30MCG TRS-SUC IM: CPT

## 2024-10-16 PROCEDURE — 90480 ADMN SARSCOV2 VAC 1/ONLY CMP: CPT

## 2024-10-16 PROCEDURE — 90471 IMMUNIZATION ADMIN: CPT

## 2024-10-22 ENCOUNTER — LAB (OUTPATIENT)
Dept: LAB | Facility: CLINIC | Age: 64
End: 2024-10-22
Payer: COMMERCIAL

## 2024-10-22 DIAGNOSIS — R53.83 FATIGUE, UNSPECIFIED TYPE: ICD-10-CM

## 2024-10-22 DIAGNOSIS — Z12.5 SCREENING PSA (PROSTATE SPECIFIC ANTIGEN): ICD-10-CM

## 2024-10-22 DIAGNOSIS — E78.5 HYPERLIPIDEMIA LDL GOAL <100: ICD-10-CM

## 2024-10-22 LAB
ALBUMIN SERPL BCG-MCNC: 4.3 G/DL (ref 3.5–5.2)
ALP SERPL-CCNC: 54 U/L (ref 40–150)
ALT SERPL W P-5'-P-CCNC: 33 U/L (ref 0–70)
ANION GAP SERPL CALCULATED.3IONS-SCNC: 10 MMOL/L (ref 7–15)
AST SERPL W P-5'-P-CCNC: 31 U/L (ref 0–45)
BILIRUB SERPL-MCNC: 0.8 MG/DL
BUN SERPL-MCNC: 12.5 MG/DL (ref 8–23)
CALCIUM SERPL-MCNC: 9.5 MG/DL (ref 8.8–10.4)
CHLORIDE SERPL-SCNC: 105 MMOL/L (ref 98–107)
CHOLEST SERPL-MCNC: 146 MG/DL
CREAT SERPL-MCNC: 0.85 MG/DL (ref 0.67–1.17)
EGFRCR SERPLBLD CKD-EPI 2021: >90 ML/MIN/1.73M2
ERYTHROCYTE [DISTWIDTH] IN BLOOD BY AUTOMATED COUNT: 12.1 % (ref 10–15)
FASTING STATUS PATIENT QL REPORTED: YES
FASTING STATUS PATIENT QL REPORTED: YES
GLUCOSE SERPL-MCNC: 101 MG/DL (ref 70–99)
HCO3 SERPL-SCNC: 26 MMOL/L (ref 22–29)
HCT VFR BLD AUTO: 43.7 % (ref 40–53)
HDLC SERPL-MCNC: 37 MG/DL
HGB BLD-MCNC: 15.4 G/DL (ref 13.3–17.7)
LDLC SERPL CALC-MCNC: 87 MG/DL
MCH RBC QN AUTO: 31.1 PG (ref 26.5–33)
MCHC RBC AUTO-ENTMCNC: 35.2 G/DL (ref 31.5–36.5)
MCV RBC AUTO: 88 FL (ref 78–100)
NONHDLC SERPL-MCNC: 109 MG/DL
PLATELET # BLD AUTO: 203 10E3/UL (ref 150–450)
POTASSIUM SERPL-SCNC: 4.5 MMOL/L (ref 3.4–5.3)
PROT SERPL-MCNC: 7.2 G/DL (ref 6.4–8.3)
PSA SERPL DL<=0.01 NG/ML-MCNC: 3.02 NG/ML (ref 0–4.5)
RBC # BLD AUTO: 4.95 10E6/UL (ref 4.4–5.9)
SODIUM SERPL-SCNC: 141 MMOL/L (ref 135–145)
TRIGL SERPL-MCNC: 112 MG/DL
TSH SERPL DL<=0.005 MIU/L-ACNC: 2.71 UIU/ML (ref 0.3–4.2)
WBC # BLD AUTO: 5.4 10E3/UL (ref 4–11)

## 2024-10-22 PROCEDURE — 80061 LIPID PANEL: CPT

## 2024-10-22 PROCEDURE — 80053 COMPREHEN METABOLIC PANEL: CPT

## 2024-10-22 PROCEDURE — G0103 PSA SCREENING: HCPCS

## 2024-10-22 PROCEDURE — 84443 ASSAY THYROID STIM HORMONE: CPT

## 2024-10-22 PROCEDURE — 85027 COMPLETE CBC AUTOMATED: CPT

## 2024-10-22 PROCEDURE — 36415 COLL VENOUS BLD VENIPUNCTURE: CPT

## 2024-10-28 ASSESSMENT — ANXIETY QUESTIONNAIRES
7. FEELING AFRAID AS IF SOMETHING AWFUL MIGHT HAPPEN: NOT AT ALL
7. FEELING AFRAID AS IF SOMETHING AWFUL MIGHT HAPPEN: NOT AT ALL
8. IF YOU CHECKED OFF ANY PROBLEMS, HOW DIFFICULT HAVE THESE MADE IT FOR YOU TO DO YOUR WORK, TAKE CARE OF THINGS AT HOME, OR GET ALONG WITH OTHER PEOPLE?: NOT DIFFICULT AT ALL
GAD7 TOTAL SCORE: 0
GAD7 TOTAL SCORE: 0
3. WORRYING TOO MUCH ABOUT DIFFERENT THINGS: NOT AT ALL
GAD7 TOTAL SCORE: 0
IF YOU CHECKED OFF ANY PROBLEMS ON THIS QUESTIONNAIRE, HOW DIFFICULT HAVE THESE PROBLEMS MADE IT FOR YOU TO DO YOUR WORK, TAKE CARE OF THINGS AT HOME, OR GET ALONG WITH OTHER PEOPLE: NOT DIFFICULT AT ALL
5. BEING SO RESTLESS THAT IT IS HARD TO SIT STILL: NOT AT ALL
1. FEELING NERVOUS, ANXIOUS, OR ON EDGE: NOT AT ALL
6. BECOMING EASILY ANNOYED OR IRRITABLE: NOT AT ALL
4. TROUBLE RELAXING: NOT AT ALL
2. NOT BEING ABLE TO STOP OR CONTROL WORRYING: NOT AT ALL

## 2024-10-30 ENCOUNTER — OFFICE VISIT (OUTPATIENT)
Dept: INTERNAL MEDICINE | Facility: CLINIC | Age: 64
End: 2024-10-30
Payer: COMMERCIAL

## 2024-10-30 VITALS
TEMPERATURE: 97.4 F | RESPIRATION RATE: 18 BRPM | DIASTOLIC BLOOD PRESSURE: 82 MMHG | BODY MASS INDEX: 31.7 KG/M2 | WEIGHT: 247 LBS | HEIGHT: 74 IN | OXYGEN SATURATION: 93 % | HEART RATE: 77 BPM | SYSTOLIC BLOOD PRESSURE: 128 MMHG

## 2024-10-30 DIAGNOSIS — Z95.5 S/P RIGHT CORONARY ARTERY (RCA) STENT PLACEMENT: ICD-10-CM

## 2024-10-30 DIAGNOSIS — Z79.899 MEDICATION MANAGEMENT: ICD-10-CM

## 2024-10-30 DIAGNOSIS — Z12.5 SCREENING PSA (PROSTATE SPECIFIC ANTIGEN): ICD-10-CM

## 2024-10-30 DIAGNOSIS — I25.10 CORONARY ARTERY DISEASE INVOLVING NATIVE CORONARY ARTERY OF NATIVE HEART WITHOUT ANGINA PECTORIS: Primary | ICD-10-CM

## 2024-10-30 DIAGNOSIS — R07.89 ATYPICAL CHEST PAIN: ICD-10-CM

## 2024-10-30 DIAGNOSIS — F41.9 ANXIETY: ICD-10-CM

## 2024-10-30 DIAGNOSIS — I20.0 UNSTABLE ANGINA (H): ICD-10-CM

## 2024-10-30 DIAGNOSIS — E78.5 HYPERLIPIDEMIA LDL GOAL <100: ICD-10-CM

## 2024-10-30 PROCEDURE — 99214 OFFICE O/P EST MOD 30 MIN: CPT | Performed by: INTERNAL MEDICINE

## 2024-10-30 RX ORDER — CITALOPRAM HYDROBROMIDE 20 MG/1
20 TABLET ORAL DAILY
Qty: 90 TABLET | Refills: 3 | Status: SHIPPED | OUTPATIENT
Start: 2024-10-30

## 2024-10-30 RX ORDER — METOPROLOL SUCCINATE 25 MG/1
12.5 TABLET, EXTENDED RELEASE ORAL DAILY
Qty: 45 TABLET | Refills: 3 | Status: SHIPPED | OUTPATIENT
Start: 2024-10-30

## 2024-10-30 RX ORDER — NITROGLYCERIN 0.4 MG/1
0.4 TABLET SUBLINGUAL EVERY 5 MIN PRN
Qty: 25 TABLET | Refills: 3 | Status: SHIPPED | OUTPATIENT
Start: 2024-10-30

## 2024-10-30 RX ORDER — ATORVASTATIN CALCIUM 20 MG/1
20 TABLET, FILM COATED ORAL EVERY EVENING
Qty: 90 TABLET | Refills: 3 | Status: SHIPPED | OUTPATIENT
Start: 2024-10-30

## 2024-10-30 NOTE — PROGRESS NOTES
"  Assessment & Plan   (I25.10) Coronary artery disease involving native coronary artery of native heart without angina pectoris  (primary encounter diagnosis)  Comment: No symptoms of angina or CHF.   Plan: CBC with platelets, Comprehensive metabolic         panel, Lipid panel reflex to direct LDL Fasting          (Z95.5) S/P right coronary artery (RCA) stent placement  Comment: Refilled NTG, which he has not required.   Plan: nitroGLYcerin (NITROSTAT) 0.4 MG sublingual         tablet          (R07.89) Atypical chest pain  Comment: Continue chronic meds.   Plan: metoprolol succinate ER (TOPROL XL) 25 MG 24 hr        tablet          (I20.0) Unstable angina (H)  Comment: Continue statin.   Plan: atorvastatin (LIPITOR) 20 MG tablet          (E78.5) Hyperlipidemia LDL goal <100    (F41.9) Anxiety  Comment: Stable. Continue citalopram.   Plan: citalopram (CELEXA) 20 MG tablet          (Z79.899) Medication management  Plan: TSH with free T4 reflex          (Z12.5) Screening PSA (prostate specific antigen)  Plan: PSA, screen                  BMI  Estimated body mass index is 31.71 kg/m  as calculated from the following:    Height as of this encounter: 1.88 m (6' 2\").    Weight as of this encounter: 112 kg (247 lb).   Weight management plan: Discussed healthy diet and exercise guidelines      Patient Instructions   Your glucose is barely into the PREdiabetes range.  PREdiabetes: (fasting glucose:100-126, A1c: 5.7-6.4)  Diabetes: (fasting glucose over 126, A1c 6.5 or above).    Your glucose (blood sugar) is slightly elevated, but not yet in diabetes range which is over 126. To avoid diabetes in the future, reduce your intake of sweets, starches (like bread, pasta, rice and potatoes), walk regularly, and maintain or reduce your weight.    Recent labs otherwise all look fine.     Refilled all meds.   IF you try to taper off of citalopram, recommend one-half tab daily for two weeks, then one-half tab every other day for a week " or two, then stop.     See me in a year, with labs in advance if you'd like.       Patrick Banegas is a 64 year old, presenting for the following health issues:  Follow Up (Depression/anxiety, cholesterol, heart/circulation concerns)      10/30/2024     9:01 AM   Additional Questions   Roomed by Pretty HELLER CMA     History of Present Illness       Mental Health Follow-up:  Patient presents to follow-up on Anxiety.    Patient's anxiety since last visit has been:  Good  The patient is not having other symptoms associated with anxiety.  Any significant life events: No  Patient is not feeling anxious or having panic attacks.  Patient has no concerns about alcohol or drug use.    Hyperlipidemia:  He presents for follow up of hyperlipidemia.   He is taking medication to lower cholesterol. He is not having myalgia or other side effects to statin medications.    Vascular Disease:  He presents for follow up of vascular disease.      He takes daily aspirin.    He eats 2-3 servings of fruits and vegetables daily.He consumes 0 sweetened beverage(s) daily.He exercises with enough effort to increase his heart rate 9 or less minutes per day.  He exercises with enough effort to increase his heart rate 3 or less days per week.   He is taking medications regularly.     Patient has not had much problem with chest pains.     Anxiety symptoms under stable control with chronic dosing of citalopram.   Has not required any use of clonazepam.   He has some level of interest in trying to taper off of this. We agreed to defer this for now.     No symptoms to suggest angina or CHF..       Past medical, family and social histories as well as medications reviewed and updated as needed.    REVIEW OF SYSTEMS: The following systems have been completely reviewed and are negative except as noted above:   Constitutional, respiratory, cardiovascular, psychiatric, and neurologic systems.        Objective    /82 (BP Location: Left arm, Patient  "Position: Sitting, Cuff Size: Adult Large)   Pulse 77   Temp 97.4  F (36.3  C) (Tympanic)   Resp 18   Ht 1.88 m (6' 2\")   Wt 112 kg (247 lb)   SpO2 93%   BMI 31.71 kg/m    Body mass index is 31.71 kg/m .    Physical Exam   GENERAL: alert and no distress  RESP: lungs clear to auscultation - no rales, rhonchi or wheezes  CV: regular rate and rhythm, normal S1 S2, no S3 or S4, no murmur, click or rub, no peripheral edema  MS: no gross musculoskeletal defects noted, no edema  PSYCH: mentation appears normal, affect normal/bright            Signed Electronically by: Ildefonso Lucas MD,     "

## 2024-10-30 NOTE — PATIENT INSTRUCTIONS
Your glucose is barely into the PREdiabetes range.  PREdiabetes: (fasting glucose:100-126, A1c: 5.7-6.4)  Diabetes: (fasting glucose over 126, A1c 6.5 or above).    Your glucose (blood sugar) is slightly elevated, but not yet in diabetes range which is over 126. To avoid diabetes in the future, reduce your intake of sweets, starches (like bread, pasta, rice and potatoes), walk regularly, and maintain or reduce your weight.    Recent labs otherwise all look fine.     Refilled all meds.   IF you try to taper off of citalopram, recommend one-half tab daily for two weeks, then one-half tab every other day for a week or two, then stop.     See me in a year, with labs in advance if you'd like.

## 2025-03-22 ENCOUNTER — HEALTH MAINTENANCE LETTER (OUTPATIENT)
Age: 65
End: 2025-03-22

## 2025-04-12 ENCOUNTER — HEALTH MAINTENANCE LETTER (OUTPATIENT)
Age: 65
End: 2025-04-12

## 2025-05-03 ENCOUNTER — HEALTH MAINTENANCE LETTER (OUTPATIENT)
Age: 65
End: 2025-05-03

## 2025-06-30 NOTE — ED PROVIDER NOTES
History     Chief Complaint:  Chest Pain    HPI  Amado Veliz is a 60 year old male with a history of HLD who presents to the emergency department for evaluation of chest pain. The patient reports a month of intermittent mid-left sided chest pain/heaviness. He initially felt this while walking up a hill during his daily exercise and it resolved with rest. He has switched to only walking flat areas after that. Over the last week now he states these episodes of pain have been happening even while resting. They last 45 minutes to an hour, occur twice per day, and do not radiate anywhere. The patient does not feel nauseous or diaphoretic while feeling this chest pain. The patient denies any fever, cough, shortness of breath, nausea or vomiting. The patient traveled to [place] on March 12th and returned a few days later, no leg pain or swelling.     Allergies:  Niacin    Medications:    Aspirin   Glucosamine   Selenium   Valium   Vicodin    Past Medical History:    Hemorrhage of rectum and anus  Mixed hyperlipidemia  C. Diff intestinal infection     Past Surgical History:    History reviewed. No pertinent surgical history.    Family History:    History reviewed. No pertinent family history.     Social History:  The patient arrives alone  Smoking: never  Alcohol use: no  PCP: Yash Whitten  Marital Status:  [2]    Review of Systems   Constitutional: Negative for diaphoresis and fever.   Respiratory: Positive for chest tightness. Negative for cough and shortness of breath.    Cardiovascular: Positive for chest pain. Negative for leg swelling.   Gastrointestinal: Negative for nausea and vomiting.   All other systems reviewed and are negative.    Physical Exam     Patient Vitals for the past 24 hrs:   BP Temp Temp src Pulse Heart Rate Resp SpO2 Height Weight   04/22/20 1647 (!) 157/85 97.5  F (36.4  C) Oral -- 62 16 95 % -- --   04/22/20 1615 (!) 159/96 -- -- -- 67 13 -- -- --   04/22/20 1600 -- -- -- -- 72 18  I would decrease to 30mg now given that she was having lower BP reported   "-- -- --   04/22/20 1545 (!) 149/99 -- -- -- 63 14 -- -- --   04/22/20 1330 137/80 -- -- 65 65 15 95 % -- --   04/22/20 1300 (!) 143/85 -- -- 65 65 16 97 % -- --   04/22/20 1230 (!) 150/97 -- -- 70 68 10 96 % -- --   04/22/20 1200 (!) 159/95 -- -- 69 69 15 97 % -- --   04/22/20 1141 (!) 172/87 97.7  F (36.5  C) -- 75 -- 14 97 % 1.88 m (6' 2\") 108.9 kg (240 lb)     Physical Exam  Nursing notes reviewed. Vitals reviewed.  General: Alert. Well kept.  Eyes:  Conjunctiva non-injected, non-icteric.  Neck/Throat: Moist mucous membranes.  Normal voice.  Cardiac: Regular rhythm. Normal heart sounds with no murmur/rubs/click. No peripheral edema.   Pulmonary: Clear and equal breath sounds bilaterally. No crackles/rales. No wheezing  Abdomen: Soft. Non-distended. Non-tender to palpation. No masses. No guarding or rebound.  Musculoskeletal: Normal gross range of motion of all 4 extremities.    Neurological: Alert and oriented x4.   Skin: Warm and dry without rashes or petechiae. Normal appearance of visualized exposed skin.  Psych: Affect normal. Good eye contact.    Emergency Department Course     ECG:  ECG taken at 1144, ECG read at 1150  Normal sinus rhythm  normal ECG  Rate 79 bpm. MD interval 176 ms. QRS duration 98 ms. QT/QTc 394/451 ms. P-R-T axes 53 7 56.    Imaging:  Radiology findings were communicated with the patient who voiced understanding of the findings.    XR Chest PA & LAT  IMPRESSION: There are no acute infiltrates. The cardiac silhouette is   not enlarged. Pulmonary vasculature is unremarkable  Reading per radiology    Laboratory:  Laboratory findings were communicated with the patient who voiced understanding of the findings.    CBC:  o/w WNL. (WBC 6.5, HGB 15.9, )   CMP: Glucose 97, o/w WNL (Creatinine: 0.69)    Lipase: 77    D-dimer: 0.5  Troponin (Collected 1222): <0.015  Troponin (Collected 1420): <0.015    Interventions:  1234 Aspirin 324 mg PO    Emergency Department Course:  Past medical " records, nursing notes, and vitals reviewed.  1155: I performed an exam of the patient and obtained history, as documented above.     IV was inserted and blood was drawn for laboratory testing, results above.  The patient was sent for an XR while in the emergency department, findings above    1408: I rechecked the patient. Explained findings to patient.    I shared service with Dr. Sutherland on this patient, seer their note for further details.     1546: I rechecked the patient. Explained findings to patient.    I personally reviewed the laboratory and imaging results with the Patient and answered all related questions prior to admission.     Findings and plan explained to the Patient who consents to admission. Discussed the patient with Dr. Ramírez, who will admit the patient to an observation bed for further monitoring, evaluation, and treatment.  Impression & Plan      Medical Decision Making:  Amado Veliz is a 60 year old male who presents to the emergency department today for evaluation of chest pain. Patient notes 1-2 weeks of exercise induced mid sternal chest pressure that is relieved with resting. He does note that he has been able to intermittently walk as long as he is not walking up hill without pain being consistent. Over the last week that pain has been occurring while at rest intermittently for up to an hour. On exam he has normal heart sounds, no peripheral edema, he did recently have travel. D-dimer today is negative and he is not tachycardic and has no hypoxia, PE is considered unlikely. EKG shows sinus rhythm. Troponin and delta troponin return negative. CBB and CMP are also unremarkable. His chest XR shows no widening of the mediastinum and normal pulmonary vasculature. The patient was given an Aspirin while in the ED. Due to concern for anginal chest pain I discussed admission with the patient for further evaluation. The patient has mo signs of infectious process or exposure to person with known  COVID. XR shows no signs of ground glass opacities. I spoke with Dr. Ramírez who accepts the patient for admission.     Diagnosis:    ICD-10-CM   1. Chest pain  R07.9       Disposition:   Admitted to observation    Scribe Disclosure:  I, Nina Lopez, am serving as a scribe at 11:55 AM on 4/22/2020 to document services personally performed by Ailyn Blackwell DNP based on my observations and the provider's statements to me.     EMERGENCY DEPARTMENT       Ailyn Blackwell CNP  04/22/20 3658

## 2025-08-19 ENCOUNTER — LAB (OUTPATIENT)
Dept: LAB | Facility: CLINIC | Age: 65
End: 2025-08-19
Payer: COMMERCIAL

## 2025-08-19 DIAGNOSIS — Z79.899 MEDICATION MANAGEMENT: ICD-10-CM

## 2025-08-19 DIAGNOSIS — I25.10 CORONARY ARTERY DISEASE INVOLVING NATIVE CORONARY ARTERY OF NATIVE HEART WITHOUT ANGINA PECTORIS: ICD-10-CM

## 2025-08-19 DIAGNOSIS — Z12.5 SCREENING PSA (PROSTATE SPECIFIC ANTIGEN): ICD-10-CM

## 2025-08-19 LAB
ALBUMIN SERPL BCG-MCNC: 4.3 G/DL (ref 3.5–5.2)
ALP SERPL-CCNC: 53 U/L (ref 40–150)
ALT SERPL W P-5'-P-CCNC: 28 U/L (ref 0–70)
ANION GAP SERPL CALCULATED.3IONS-SCNC: 9 MMOL/L (ref 7–15)
AST SERPL W P-5'-P-CCNC: 30 U/L (ref 0–45)
BILIRUB SERPL-MCNC: 0.7 MG/DL
BUN SERPL-MCNC: 13.6 MG/DL (ref 8–23)
CALCIUM SERPL-MCNC: 9.2 MG/DL (ref 8.8–10.4)
CHLORIDE SERPL-SCNC: 103 MMOL/L (ref 98–107)
CHOLEST SERPL-MCNC: 134 MG/DL
CREAT SERPL-MCNC: 0.82 MG/DL (ref 0.67–1.17)
EGFRCR SERPLBLD CKD-EPI 2021: >90 ML/MIN/1.73M2
ERYTHROCYTE [DISTWIDTH] IN BLOOD BY AUTOMATED COUNT: 12.2 % (ref 10–15)
FASTING STATUS PATIENT QL REPORTED: YES
FASTING STATUS PATIENT QL REPORTED: YES
GLUCOSE SERPL-MCNC: 97 MG/DL (ref 70–99)
HCO3 SERPL-SCNC: 26 MMOL/L (ref 22–29)
HCT VFR BLD AUTO: 43.2 % (ref 40–53)
HDLC SERPL-MCNC: 34 MG/DL
HGB BLD-MCNC: 15.5 G/DL (ref 13.3–17.7)
LDLC SERPL CALC-MCNC: 81 MG/DL
MCH RBC QN AUTO: 31.1 PG (ref 26.5–33)
MCHC RBC AUTO-ENTMCNC: 35.9 G/DL (ref 31.5–36.5)
MCV RBC AUTO: 86.6 FL (ref 78–100)
NONHDLC SERPL-MCNC: 100 MG/DL
PLATELET # BLD AUTO: 186 10E3/UL (ref 150–450)
POTASSIUM SERPL-SCNC: 4.3 MMOL/L (ref 3.4–5.3)
PROT SERPL-MCNC: 6.8 G/DL (ref 6.4–8.3)
PSA SERPL DL<=0.01 NG/ML-MCNC: 3.75 NG/ML (ref 0–4.5)
RBC # BLD AUTO: 4.99 10E6/UL (ref 4.4–5.9)
SODIUM SERPL-SCNC: 138 MMOL/L (ref 135–145)
TRIGL SERPL-MCNC: 95 MG/DL
TSH SERPL DL<=0.005 MIU/L-ACNC: 2.18 UIU/ML (ref 0.3–4.2)
WBC # BLD AUTO: 5.36 10E3/UL (ref 4–11)

## 2025-08-19 PROCEDURE — G0103 PSA SCREENING: HCPCS

## 2025-08-19 PROCEDURE — 85027 COMPLETE CBC AUTOMATED: CPT

## 2025-08-19 PROCEDURE — 84443 ASSAY THYROID STIM HORMONE: CPT

## 2025-08-19 PROCEDURE — 80061 LIPID PANEL: CPT

## 2025-08-19 PROCEDURE — 36415 COLL VENOUS BLD VENIPUNCTURE: CPT

## 2025-08-19 PROCEDURE — 80053 COMPREHEN METABOLIC PANEL: CPT

## 2025-08-31 SDOH — HEALTH STABILITY: PHYSICAL HEALTH: ON AVERAGE, HOW MANY MINUTES DO YOU ENGAGE IN EXERCISE AT THIS LEVEL?: 40 MIN

## 2025-08-31 SDOH — HEALTH STABILITY: PHYSICAL HEALTH: ON AVERAGE, HOW MANY DAYS PER WEEK DO YOU ENGAGE IN MODERATE TO STRENUOUS EXERCISE (LIKE A BRISK WALK)?: 3 DAYS

## 2025-09-02 ENCOUNTER — OFFICE VISIT (OUTPATIENT)
Dept: INTERNAL MEDICINE | Facility: CLINIC | Age: 65
End: 2025-09-02
Payer: MEDICARE

## 2025-09-02 VITALS
HEART RATE: 86 BPM | TEMPERATURE: 97.9 F | SYSTOLIC BLOOD PRESSURE: 105 MMHG | WEIGHT: 236 LBS | DIASTOLIC BLOOD PRESSURE: 72 MMHG | HEIGHT: 74 IN | BODY MASS INDEX: 30.29 KG/M2 | RESPIRATION RATE: 18 BRPM | OXYGEN SATURATION: 97 %

## 2025-09-02 DIAGNOSIS — R07.89 ATYPICAL CHEST PAIN: ICD-10-CM

## 2025-09-02 DIAGNOSIS — F41.9 ANXIETY: ICD-10-CM

## 2025-09-02 DIAGNOSIS — I20.0 UNSTABLE ANGINA (H): ICD-10-CM

## 2025-09-02 DIAGNOSIS — Z00.00 ENCOUNTER FOR MEDICARE ANNUAL WELLNESS EXAM: Primary | ICD-10-CM

## 2025-09-02 DIAGNOSIS — Z95.5 S/P RIGHT CORONARY ARTERY (RCA) STENT PLACEMENT: ICD-10-CM

## 2025-09-02 RX ORDER — CARBOXYMETHYLCELLULOSE SODIUM 5 MG/ML
1 SOLUTION/ DROPS OPHTHALMIC
COMMUNITY

## 2025-09-02 RX ORDER — ATORVASTATIN CALCIUM 20 MG/1
20 TABLET, FILM COATED ORAL EVERY EVENING
Qty: 90 TABLET | Refills: 3 | Status: SHIPPED | OUTPATIENT
Start: 2025-09-02

## 2025-09-02 RX ORDER — CITALOPRAM HYDROBROMIDE 20 MG/1
20 TABLET ORAL DAILY
Qty: 90 TABLET | Refills: 3 | Status: SHIPPED | OUTPATIENT
Start: 2025-09-02

## 2025-09-02 RX ORDER — METOPROLOL SUCCINATE 25 MG/1
12.5 TABLET, EXTENDED RELEASE ORAL DAILY
Qty: 45 TABLET | Refills: 3 | Status: SHIPPED | OUTPATIENT
Start: 2025-09-02

## 2025-09-02 RX ORDER — CLONAZEPAM 0.5 MG/1
0.5 TABLET ORAL 2 TIMES DAILY PRN
Qty: 15 TABLET | Refills: 0 | Status: SHIPPED | OUTPATIENT
Start: 2025-09-02

## 2025-09-02 RX ORDER — NITROGLYCERIN 0.4 MG/1
0.4 TABLET SUBLINGUAL EVERY 5 MIN PRN
Qty: 25 TABLET | Refills: 3 | Status: SHIPPED | OUTPATIENT
Start: 2025-09-02

## (undated) DEVICE — GUIDEWIRE VASC 0.014INX180CM RUNTHROUGH 25-1011

## (undated) DEVICE — KT CATH DRAGONFLY INTVASC IMG

## (undated) DEVICE — MANIFOLD KIT ANGIO AUTOMATED 014613

## (undated) DEVICE — SLEEVE TR BAND RADIAL COMPRESSION DEVICE 29CM XX-RF06L

## (undated) DEVICE — INFL DVC KIT W/10CC NITRO IN4530

## (undated) DEVICE — INTRO GLIDESHEATH SLENDER 6FR 10X45CM 60-1060

## (undated) DEVICE — SLEEVE TR BAND RADIAL COMPRESSION DEVICE 24CM TRB24-REG

## (undated) DEVICE — CATH LAUNCHER 6FR JR 4.0 LA6JR40

## (undated) DEVICE — KIT HAND CONTROL ANGIOTOUCH ACIST 65CM AT-P65

## (undated) DEVICE — CATH DIAGNOSTIC RADIAL 5FR TIG 4.0

## (undated) DEVICE — DEFIB PRO-PADZ LVP LQD GEL ADULT 8900-2105-01

## (undated) DEVICE — CATH BALLOON NC EMERGE 4.50X12MM H7493926712450

## (undated) DEVICE — TOTE ANGIO CORP PC15AT SAN32CC83O

## (undated) DEVICE — VALVE HEMOSTASIS .096" COPILOT MECH 1003331

## (undated) RX ORDER — REGADENOSON 0.08 MG/ML
INJECTION, SOLUTION INTRAVENOUS
Status: DISPENSED
Start: 2020-11-18

## (undated) RX ORDER — NITROGLYCERIN 5 MG/ML
VIAL (ML) INTRAVENOUS
Status: DISPENSED
Start: 2020-04-23

## (undated) RX ORDER — VERAPAMIL HYDROCHLORIDE 2.5 MG/ML
INJECTION, SOLUTION INTRAVENOUS
Status: DISPENSED
Start: 2020-04-23

## (undated) RX ORDER — FENTANYL CITRATE 50 UG/ML
INJECTION, SOLUTION INTRAMUSCULAR; INTRAVENOUS
Status: DISPENSED
Start: 2020-04-23

## (undated) RX ORDER — HEPARIN SODIUM 1000 [USP'U]/ML
INJECTION, SOLUTION INTRAVENOUS; SUBCUTANEOUS
Status: DISPENSED
Start: 2020-04-23

## (undated) RX ORDER — LIDOCAINE HYDROCHLORIDE 10 MG/ML
INJECTION, SOLUTION EPIDURAL; INFILTRATION; INTRACAUDAL; PERINEURAL
Status: DISPENSED
Start: 2020-04-23